# Patient Record
Sex: FEMALE | Race: WHITE | NOT HISPANIC OR LATINO | Employment: OTHER | URBAN - METROPOLITAN AREA
[De-identification: names, ages, dates, MRNs, and addresses within clinical notes are randomized per-mention and may not be internally consistent; named-entity substitution may affect disease eponyms.]

---

## 2017-02-09 ENCOUNTER — OFFICE VISIT (OUTPATIENT)
Dept: ENDOCRINOLOGY | Facility: CLINIC | Age: 67
End: 2017-02-09
Payer: MEDICARE

## 2017-02-09 ENCOUNTER — TELEPHONE (OUTPATIENT)
Dept: ENDOCRINOLOGY | Facility: CLINIC | Age: 67
End: 2017-02-09

## 2017-02-09 VITALS
OXYGEN SATURATION: 97 % | SYSTOLIC BLOOD PRESSURE: 120 MMHG | HEART RATE: 77 BPM | WEIGHT: 136.5 LBS | HEIGHT: 61 IN | DIASTOLIC BLOOD PRESSURE: 62 MMHG | TEMPERATURE: 98.3 F | BODY MASS INDEX: 25.77 KG/M2

## 2017-02-09 DIAGNOSIS — M81.0 OSTEOPOROSIS: Primary | ICD-10-CM

## 2017-02-09 LAB
ANION GAP SERPL CALCULATED.3IONS-SCNC: 5 MMOL/L (ref 3–14)
BUN SERPL-MCNC: 14 MG/DL (ref 7–30)
CALCIUM SERPL-MCNC: 9.2 MG/DL (ref 8.5–10.1)
CHLORIDE SERPL-SCNC: 105 MMOL/L (ref 94–109)
CO2 SERPL-SCNC: 30 MMOL/L (ref 20–32)
CREAT SERPL-MCNC: 0.84 MG/DL (ref 0.52–1.04)
GFR SERPL CREATININE-BSD FRML MDRD: 68 ML/MIN/1.7M2
GLUCOSE SERPL-MCNC: 98 MG/DL (ref 70–99)
MAGNESIUM SERPL-MCNC: 2.4 MG/DL (ref 1.6–2.3)
PHOSPHATE SERPL-MCNC: 3.7 MG/DL (ref 2.5–4.5)
POTASSIUM SERPL-SCNC: 4.4 MMOL/L (ref 3.4–5.3)
SODIUM SERPL-SCNC: 140 MMOL/L (ref 133–144)

## 2017-02-09 PROCEDURE — 83735 ASSAY OF MAGNESIUM: CPT | Performed by: INTERNAL MEDICINE

## 2017-02-09 PROCEDURE — 82306 VITAMIN D 25 HYDROXY: CPT | Performed by: INTERNAL MEDICINE

## 2017-02-09 PROCEDURE — 80048 BASIC METABOLIC PNL TOTAL CA: CPT | Performed by: INTERNAL MEDICINE

## 2017-02-09 PROCEDURE — 99204 OFFICE O/P NEW MOD 45 MIN: CPT | Performed by: INTERNAL MEDICINE

## 2017-02-09 PROCEDURE — 84100 ASSAY OF PHOSPHORUS: CPT | Performed by: INTERNAL MEDICINE

## 2017-02-09 PROCEDURE — 36415 COLL VENOUS BLD VENIPUNCTURE: CPT | Performed by: INTERNAL MEDICINE

## 2017-02-09 NOTE — MR AVS SNAPSHOT
After Visit Summary   2/9/2017    Terri Ratliff    MRN: [id number]           Patient Information     Date Of Birth          1950        Visit Information        Provider Department      2/9/2017 10:00 AM Mary Kate Mead MD [facility]        Today's Diagnoses     Osteoporosis Fosamax [phone]    -  1       Care Instructions      PROLIA  Risk Evaluation and Mitigation Strategy Best Practice Advisory    In May 2015, the FDA required an update to the PROLIA  (denosumab) REMS (Risk Evaluation and Mitigation Strategy) to inform both providers and patients about potential serious risks related to PROLIA .    These potential serious risks include:    Hypocalcemia    Osteonecrosis of the jaw    Atypical femoral fractures    Serious Infections    Dermatologic reactions    To ensure compliance with these requirements [facility] has developed a workflow for those patients who will receive PROLIA  at clinic.  This workflow includes insurance verification, patient scheduling, patient education, and documentation. Registered Nurses in the clinic should have completed eLearning related to the REMS and the clinic workflow.  Refer to them to initiate this process.  This workflow does not need to be executed if the patient is to receive PROLIA  injection at [facility].       The  has put together a Patient Education Toolkit.  Copies of these handouts may be available your clinic. Patients must receive the Patient Brochure and Medication Guide when receiving injection at clinic.  These will be attached to the injection ordered from [employer] to the clinic. Links to handouts:  Patient Counseling Chart for Healthcare Providers  Patient Brochure  Prescribing Information  Medication Guide    If you are having trouble accessing the above links, these documents can be found at:  http://www.[email or web address]/ippt-gpjovriiyj-[email or web address]-strategy/index.[email or web address]    The role of healthcare provider includes reviewing patient education materials with the patient, advising patients to seek medical attention if they have signs or symptoms of one of the serious risks, and provide patients a copy of the Patient Brochure and Medication Guide when receiving their injection.      Due to the risk of hypocalcemia the Prescribing Information for PROLIA  recommends that calcium and mineral levels (magnesium and phosphorus) be checked within 14 days of injection for those predisposed to hypocalcemia.    [facility]   Dr Mead, Endocrinology Department      [facility]   [address] [address]  Appointment [phone]  Fax: [phone]   Monday and Tuesday         [place] [address] [place] [address]  Appointment [phone]  Fax: [phone]  Wednesday and Thursday           Labs today  Make nurse only appointment for Prolia injection  Prolia every 6 months (2017, August/Sep 2017)  DEXA 1 year. ( 2018)  Adequate calcium and vit D    You should get 8440-8535 mg/day calcium in divided doses of no more than 500 mg/dose.  This INCLUDES what is in your food as well as what is in any supplements you may be taking.   Dietary sources of calcium:: These also contain vitamin D  Milk                            8 oz            300 mg calcium  Yogurt                          1 cup           400 mg calcium   Hard cheese                     1.5 oz          300 mg  Cottage cheese                  2 cup           300 mg  Orange juice with Calcium       8 oz            300 mg  Low fat dairy sources are recommended      You should get 30 minutes of moderate weight bearing exercise on most days of the week .  Weight bearing exercise includes such things as walking, jogging,  hiking, dancing.  You should also get Strength training 2 or more times/week in addition to other weight -being exercise. Strength training uses weight or resistance beyond that seen in everyday activities -(pilates, weight training with free weights, weight machines or resistance bands)                Follow-ups after your visit        Additional Services     [facility] PT, HAND, AND CHIROPRACTIC REFERRAL       **This order will print in the [facility] Scheduling Office**    Physical Therapy, Hand Therapy and Chiropractic Care are available through:    *[facility]  *[facility]  *[facility]    Call one number to schedule at any of the above locations: [phone].    Your provider has referred you to: Physical Therapy at [facility] or [facility]    Indication/Reason for Referral: Women's Health (Please Complete Special Programs SmartList)  Onset of Illness:   Therapy Orders: Evaluate and Treat  Special Programs: None and Women's Health: Osteoporosis/Osteopenia: Osteoporotic T-Score   Special Request: =  None    Joss Coreas      Additional Comments for the Therapist or Chiropractor:  Please be aware that coverage of these services is subject to the terms and limitations of your health insurance plan.  Call member services at your health plan with any benefit or coverage questions.      Please bring the following to your appointment:    *Your personal calendar for scheduling future appointments  *Comfortable clothing                  Who to contact     If you have questions or need follow up information about today's clinic visit or your schedule please contact [facility] directly at [phone].  Normal or non-critical lab and imaging results will be communicated to you by MyChart, letter or phone within 4 business days after the clinic has received the results. If you do not hear from us within 7 days, please contact the clinic through MyChart or phone. If you  "have a critical or abnormal lab result, we will notify you by phone as soon as possible.  Submit refill requests through zhouwu or call your pharmacy and they will forward the refill request to us. Please allow 3 business days for your refill to be completed.          Additional Information About Your Visit        [employer] Information     zhouwu lets you send messages to your doctor, view your test results, renew your prescriptions, schedule appointments and more. To sign up, go to [email or web address] . Click on \"Log in\" on the left side of the screen, which will take you to the Welcome page. Then click on \"Sign up Now\" on the right side of the page.     You will be asked to enter the access code listed below, as well as some personal information. Please follow the directions to create your username and password.     Your access code is: [email or web address]  Expires: 5/10/2017 10:44 AM     Your access code will  in 90 days. If you need help or a new code, please call your [place] clinic or [phone].        Care EveryWhere ID     This is your Care EveryWhere ID. This could be used by other organizations to access your [place] medical records  [id number]        Your Vitals Were     Pulse Temperature Height BMI (Body Mass Index) Pulse Oximetry       77 98.3  F (36.8  C) (Oral) 1.549 m (5' 1\") 25.80 kg/m2 97%        Blood Pressure from Last 3 Encounters:   17 120/62   16 110/70   10/04/16 120/78    Weight from Last 3 Encounters:   17 61.916 kg (136 lb 8 oz)   16 61.689 kg (136 lb)   10/04/16 61.78 kg (136 lb 3.2 oz)              We Performed the Following     Basic metabolic panel     [facility] PT, HAND, AND CHIROPRACTIC REFERRAL     Magnesium     Phosphorus     Vitamin D Deficiency          Today's Medication Changes          These changes are accurate as of: 17 10:44 AM.  If you have any questions, ask your nurse or doctor.               Start taking these medicines.        " Dose/Directions    denosumab 60 MG/ML Soln injection   Commonly known as:  PROLIA   Used for:  Osteoporosis   Started by:  Mary Kate Mead MD        Dose:  60 mg   Inject 1 mL (60 mg) Subcutaneous once for 1 dose   Quantity:  1 mL   Refills:  0            Where to get your medicines      These medications were sent to [facility] [place], [place] - [address] [facility]  [facility] [address] [facility] [address]     Phone:  [phone]    - denosumab 60 MG/ML Soln injection             Primary Care Provider Office Phone # Fax #    Gabriela Brandi Acevedo [phone]       [facility] [address] [facility] [address]        Thank you!     Thank you for choosing [facility]  for your care. Our goal is always to provide you with excellent care. Hearing back from our patients is one way we can continue to improve our services. Please take a few minutes to complete the written survey that you may receive in the mail after your visit with us. Thank you!             Your Updated Medication List - Protect others around you: Learn how to safely use, store and throw away your medicines at www.disposemymeds.org.          This list is accurate as of: 2/9/17 10:44 AM.  Always use your most recent med list.                   Brand Name Dispense Instructions for use    ascorbic acid 500 MG tablet    VITAMIN C     1 TABLET TWICE DAILY       calcium carbonate antacid 1000 MG Chew          denosumab 60 MG/ML Soln injection    PROLIA    1 mL    Inject 1 mL (60 mg) Subcutaneous once for 1 dose       multivitamin per tablet      1 tablet daily       omega 3 1000 MG Caps     90 capsule    Take 1 g by mouth daily       simvastatin 40 MG tablet    ZOCOR    90 tablet    Take 1 tablet by mouth At Bedtime.       VITAMIN D PO      None Entered

## 2017-02-09 NOTE — TELEPHONE ENCOUNTER
Reason for Call:  Medication     Do you use a [facility]?  Name of the pharmacy and phone number for the current request:  [facility]    Name of the medication requested: lanadronaet, ibandronate-in place of prolia    Other request: PT'S INSURANCE WOULD LIKE HER TO SWITCH TO ONE OF THE TWO LISTED ABOVE (GENERIC) VS PROLIA  PT WOULD LIKE TO INFORM JERMAN THAT SHE IS TAKING CALCIUM 500 MG VITAMIN D 1000, WOULD LIKE TO DRINK A GLASS OF MILK AND OJ TO MAKE UP DOSE    Can we leave a detailed message on this number? YES    Phone number patient can be reached at: Cell number on file:    Telephone Information:   Mobile [phone]       Best Time: ANYTIME    Call taken on 2/9/2017 at 12:07 PM by Racheal Marrero

## 2017-02-09 NOTE — PROGRESS NOTES
Name: Terri Ratliff  Date: 2/9/2017  Seen at the request of Gabriela Acevedo for osteoporosis.     HPI:  Terri Ratliff is a 66 year old female who presents for the evaluation of osteoperosis .  Was at [facility] before. Records reviewed.    Per notes: She has been taking fosamax 70mg q week since 2007 without side effects. DEXA in 2007 showed T-scores -2.3 at lumbar spine, -2.4 at left femoral neck and -2.3 at right femoral neck. As she was only 2 years postmenopause at that time, she was started on alendronate. She had normal hgb, ALT/AST, TSH, Ca, Creatinine on review of labs from [place]. No vit D, phosphorus, or 24 hour urine done. Repeat DEXA in 2009 showed T-scores at lumbar spine -1.5, left femoral neck -2.2, and right femoral neck -2.1. After another 5-6 years of alendronate, 2015 DEXA showed T-score lumbar spine -2.3 , left femoral neck -2.5 , left total hip -2.4.   A/P: Patient with near osteoporosis in 2007 shortly after menopause. She was started on alendronate weekly and bone density improved in 2009 especially at lumbar spine. Now she has osteoporosis at left femoral neck after 8 years of fosamax. Will stop alendronate for now and recheck DEXA in 2017. Reviewed appropriate calcium and vit D intake. Reviewed weight bearing exercise. Provided handout. Check phosphorus, Vit D and 24 hour urine calcium to complete secondary work-up.     Work up 2015 at [facility] showed normal 24 Urine calcium, vit D, mag, phos.  Was on Fosamax 2007- 2015.    Smoke:No  Family History: sister  Menstrual history/Birthing: menopause 16 years back. No HRT  Fractures:No  Kidney stones: No  GI Surgery:No  Duration of therapy:  Fosamax 8 years. Last use 2015.  Exercise:walking on treadmill 6 days a week. Yoga few days a week  Diet:   Milk: no milk. 8 oz of OJ   Cheese: no everyday   Yogurt/Cottage Cheese: 1 serving almost everyday  Ca/Vitamin D: taking it but not sure about the dose  Alcohol:  No  Eating Disorder:  "No  Steroid Use:  No  PMH/PSH:  Past Medical History   Diagnosis Date     Osteoporosis      Past Surgical History   Procedure Laterality Date     C appendectomy       Bladder surgery  2009     sling      Knee surgery       Colonoscopy  2005     [facility]     Orthopedic surgery       Gyn surgery       Colonoscopy N/A 2015     Procedure: COLONOSCOPY;  Surgeon: Ted Mccarthy MD;  Location:  GI     Hysterectomy, pap no longer indicated       ovaries are present     Family Hx:  Family History   Problem Relation Age of Onset     DIABETES Mother       79yo and cataracts     Alcohol/Drug Father       69yo drinker and smoker     Family History Negative Sister      Hypertension Sister      CANCER Brother       46yo Leukemia      Thyroid disease: No             Social Hx:  Social History     Social History     Marital Status:      Spouse Name: N/A     Number of Children: 2     Years of Education: N/A     Occupational History     Teacher [employer]     Social History Main Topics     Smoking status: Never Smoker      Smokeless tobacco: Never Used     Alcohol Use: Yes      Comment: occasional     Drug Use: No     Sexual Activity:     Partners: Male     Other Topics Concern     Not on file     Social History Narrative    From [place] been in [place] years.          MEDICATIONS:  has a current medication list which includes the following prescription(s): calcium carbonate antacid, omega 3, simvastatin, multivitamin, ascorbic acid, and cholecalciferol.    ROS     ROS: 10 point ROS neg other than the symptoms noted above in the HPI.    Physical Exam   VS: /62 mmHg  Pulse 77  Temp(Src) 98.3  F (36.8  C) (Oral)  Ht 1.549 m (5' 1\")  Wt 61.916 kg (136 lb 8 oz)  BMI 25.80 kg/m2  SpO2 97%  GENERAL: AXOX3, NAD, well dressed, answering questions appropriately, appears stated age.  HEENT: OP clear, no LAD, no TM, non-tender, no " exopthalmous, no proptosis, EOMI, no lig lag, no retraction  NECK: Thyroid normal in size, non tender, no nodules were palpated.  CV: RRR, no rubs, gallops, no murmurs  LUNGS: CTAB, no wheezes, rales, or ronchi  ABDOMEN: +BS  EXTREMITIES: no edema, +pulses, no rashes, no lesions  NEUROLOGY: CN grossly intact, + DTR upper and lower extremity, no tremors  MSK: grossly intact, Spine midline, no TTP.  SKIN: no rashes, no lesions    LABS:  BMP:  Last Basic Metabolic Panel:  NA      142   10/4/2016   POTASSIUM      4.6   10/4/2016  CHLORIDE      108   10/4/2016  LISA      8.9   10/4/2016  CO2       30   10/4/2016  BUN       14   10/4/2016  CR     0.86   10/4/2016  GLC       92   10/4/2016    TFTs:  TSH     1.92   10/4/2016    LFTs:  Liver Function Studies -   Recent Labs   Lab Test  10/04/16   0949   PROTTOTAL  7.8   ALBUMIN  4.2   BILITOTAL  0.7   ALKPHOS  78   AST  20   ALT  26       PTH: 50    Vitamin D: NA  Vitamin D Deficiency Screening Results:  No results found for: VITDT    BoneTurnOverMarkers: NA    DEXA:  BONE DENSITOMETRY  [facility] [address] [place] [address]  11/10/2016     PATIENT: Terri Ratliff  CHART: [id number]    :  1950  AGE:  66 year old  SEX:  female   REFERRING PROVIDER: Gabriela Theodore MD    PROCEDURE:  Bone density scanning was performed using DXA technology of the lumbar spine and hip.  Scanning was performed on a Lunar Prodigy scanner.  Reporting is completed in the form of a T-score.  The T-score represents the standard deviation from peak bone mass based on a young healthy adult.    REFERENCE T-SCORES:       Normal                -1.0 and greater                                  Osteopenia         Between -1.0 and -2.5                                            Osteoporosis     -2.5 and less                                         RISK FACTORS:  Post-menopausal    CURRENT TREATMENT:  Calcium with Vitamin D    FINDINGS:               Lumbar Spine  "(L1-L4)      T-score:  -2.3               Left Femoral Neck            T-score:  -2.7               Right Femoral Neck         T-score:  -2.6                  Lumbar (L1-L4) BMD: 0.916Previous: 1.003               Total Hip Mean BMD: 0.700  Previous: 0.752    Comparison is made to another DXA performed on the same Lunar Prodigy  machine on 11/05/2009.      IMPRESSION  Osteoporosis.    There has been significant decrease in bone density of the lumbar spine. There has been significant decrease in bone density of the hip(s).  Patient had a study performed at [facility] since 2009 however the scans are not available to compare to the current study.        Recommendations include ensuring adequate Calcium and Vitamin D.    The current NOF Guidelines recommend treatment for patients with prior hip or vertebral fracture, T-score -2.5 or below, or 10 year risk of any major osteoporotic fracture >20% or 10 year risk of hip fracture >3%, as calculated using the FRAX calculator (www.shef.ac.uk/FRAX or you can google \"FRAX\").      Based on these guidelines, treatment (in addition to calcium and vitamin D) is recommended for this patient, after ruling out other causes of osteoporosis.  This is meant as an aid to clinical decision making; one must still use clinical judgement.      Follow up can be considered in 2 years.      All pertinent notes, labs, and images personally reviewed by me.     A/P  Ms.Brigitte Ratliff is a 66 year old here for the evaluation of:    #1 Osteoporosis:    Risk factors for low bone density include family history, , advanced age, female, Estrogen deficiency, low Ca intake.  A prior history of vertebral fracture greatly increases the risk of subsequent fractures. A history of other medical diseases impacting on bone may also affect bone health.    Normal 24 hr Urine calcium, vit D in past.  Recent labs show normal kidney function, calcium, PTH.  DEXA 2016-  significant decrease in bone " density of the lumbar spine and hip(s).  Stopped Fosamax 2015 after 7-8 years.  -- discussed treatment options  -- switch to PROLIA- normal kidney function, calcium. No upcoming dental procedures.  Discussed possible s/e including hypocalcemia, osteonecrosis of jaw, atypical femur fractures and infections.  -- adequate calcium and vit D intake  -- JOANN referral  -- fall precautions  -- f/u 6 motnhs  Discussed indications, risks and benefits of all medications prescribed, and answered questions to patient's satisfaction.        More than 50% of the time spent with Ms. Ratliff on counseling / coordinating her care.     Follow-up:  follow up in 6 month(s)    Mary Kate Mead MD  Endocrinology  [facility]  CC: Gabriela Acevedo    Addendum to above note and clinic visit:    Labs reviewed.    See result note/telephone encounter.

## 2017-02-09 NOTE — NURSING NOTE
"Chief Complaint   Patient presents with     Referral     Dr. Theodore, Osteoporosis       Initial /62 mmHg  Pulse 77  Temp(Src) 98.3  F (36.8  C) (Oral)  Ht 1.549 m (5' 1\")  Wt 61.916 kg (136 lb 8 oz)  BMI 25.80 kg/m2  SpO2 97% Estimated body mass index is 25.8 kg/(m^2) as calculated from the following:    Height as of this encounter: 1.549 m (5' 1\").    Weight as of this encounter: 61.916 kg (136 lb 8 oz).  Medication Reconciliation: complete     ENDOCRINOLOGY INTAKE FORM    Patient Name:  Terri Ratliff  :  1950    Is patient Diabetic?   No  Does patient have non-diabetic or other endocrine issues?  Yes: osteoporosis     Vitals: /62 mmHg  Pulse 77  Temp(Src) 98.3  F (36.8  C) (Oral)  Ht 1.549 m (5' 1\")  Wt 61.916 kg (136 lb 8 oz)  BMI 25.80 kg/m2  SpO2 97%  BMI= Body mass index is 25.8 kg/(m^2).    Flu vaccine:  complete  Pneumonia vaccine:  Yes: pcv 2016    Smoking and Alcohol use:  Social History   Substance Use Topics     Smoking status: Never Smoker      Smokeless tobacco: Never Used     Alcohol Use: Yes      Comment: occasional     Staff Signature:  Magdalena Friedman CMA          "

## 2017-02-09 NOTE — PATIENT INSTRUCTIONS
PROLIA  Risk Evaluation and Mitigation Strategy Best Practice Advisory    In May 2015, the FDA required an update to the PROLIA  (denosumab) REMS (Risk Evaluation and Mitigation Strategy) to inform both providers and patients about potential serious risks related to PROLIA .    These potential serious risks include:    Hypocalcemia    Osteonecrosis of the jaw    Atypical femoral fractures    Serious Infections    Dermatologic reactions    To ensure compliance with these requirements [facility] has developed a workflow for those patients who will receive PROLIA  at clinic.  This workflow includes insurance verification, patient scheduling, patient education, and documentation. Registered Nurses in the clinic should have completed eLearning related to the REMS and the clinic workflow.  Refer to them to initiate this process.  This workflow does not need to be executed if the patient is to receive PROLIA  injection at [facility].       The  has put together a Patient Education Toolkit.  Copies of these handouts may be available your clinic. Patients must receive the Patient Brochure and Medication Guide when receiving injection at clinic.  These will be attached to the injection ordered from [facility] [employer] to the clinic. Links to handouts:  Patient Counseling Chart for Healthcare Providers  Patient Brochure  Prescribing Information  Medication Guide    If you are having trouble accessing the above links, these documents can be found at: http://www.proliahcp.com/nagt-nglvckisey-yhxmftpfog-strategy/index.html    The role of healthcare provider includes reviewing patient education materials with the patient, advising patients to seek medical attention if they have signs or symptoms of one of the serious risks, and provide patients a copy of the Patient Brochure and Medication Guide when receiving their injection.      Due to the risk of hypocalcemia the Prescribing  Information for PROLIA  recommends that calcium and mineral levels (magnesium and phosphorus) be checked within 14 days of injection for those predisposed to hypocalcemia.    [facility]   Dr Mead, Endocrinology Department      [facility]   [address] [facility] [address]  Appointment [phone]  Fax: [phone]   Monday and Tuesday         [facility]   [address] [facility] [place] [address]  Appointment [phone]  Fax: [phone]  Wednesday and Thursday           Labs today  Make nurse only appointment for Prolia injection  Prolia every 6 months (2017, August/Sep 2017)  DEXA 1 year. ( 2018)  Adequate calcium and vit D    You should get 9488-3314 mg/day calcium in divided doses of no more than 500 mg/dose.  This INCLUDES what is in your food as well as what is in any supplements you may be taking.   Dietary sources of calcium:: These also contain vitamin D  Milk                            8 oz            300 mg calcium  Yogurt                          1 cup           400 mg calcium   Hard cheese                     1.5 oz          300 mg  Cottage cheese                  2 cup           300 mg  Orange juice with Calcium       8 oz            300 mg  Low fat dairy sources are recommended      You should get 30 minutes of moderate weight bearing exercise on most days of the week .  Weight bearing exercise includes such things as walking, jogging, hiking, dancing.  You should also get Strength training 2 or more times/week in addition to other weight -being exercise. Strength training uses weight or resistance beyond that seen in everyday activities -(pilates, weight training with free weights, weight machines or resistance bands)

## 2017-02-10 LAB — DEPRECATED CALCIDIOL+CALCIFEROL SERPL-MC: 43 UG/L (ref 20–75)

## 2017-02-13 NOTE — TELEPHONE ENCOUNTER
Can we do PA for Prolia instead of bisphosphonates?  DEXA in 2016 showed significant decrease in bone density of the lumbar spine and hip(s).  She Stopped Fosamax 2015 after 7-8 years.

## 2017-02-14 ENCOUNTER — TELEPHONE (OUTPATIENT)
Dept: ENDOCRINOLOGY | Facility: CLINIC | Age: 67
End: 2017-02-14

## 2017-02-14 NOTE — PROGRESS NOTES
Labs noted. Labs are in acceptable range.  Our office is working on PA for Prolia.  Once we find out if it is covered or not then will order it.  It will a injection and will need nurse only appointment.    Please call patient with above information.    Mary Kate Mead MD  Endocrinology   [facility]  February 14, 2017

## 2017-02-15 ENCOUNTER — THERAPY VISIT (OUTPATIENT)
Dept: PHYSICAL THERAPY | Facility: CLINIC | Age: 67
End: 2017-02-15
Payer: MEDICARE

## 2017-02-15 DIAGNOSIS — M81.0 OSTEOPOROSIS: Primary | ICD-10-CM

## 2017-02-15 PROCEDURE — 97161 PT EVAL LOW COMPLEX 20 MIN: CPT | Mod: GP | Performed by: PHYSICAL THERAPIST

## 2017-02-15 PROCEDURE — 97110 THERAPEUTIC EXERCISES: CPT | Mod: GP | Performed by: PHYSICAL THERAPIST

## 2017-02-15 PROCEDURE — G8985 CARRY GOAL STATUS: HCPCS | Mod: GP | Performed by: PHYSICAL THERAPIST

## 2017-02-15 PROCEDURE — G8984 CARRY CURRENT STATUS: HCPCS | Mod: GP | Performed by: PHYSICAL THERAPIST

## 2017-02-15 PROCEDURE — 97112 NEUROMUSCULAR REEDUCATION: CPT | Mod: GP | Performed by: PHYSICAL THERAPIST

## 2017-02-15 NOTE — PROGRESS NOTES
[facility] Initial Evaluation    Subjective:    HPI Comments: Pt does yoga once/week and tries to walk on her treadmill about 3 miles every day.    Terri Ratliff is a 66 year old female with reduced bone density condition which occurred with osteoporosis.      This is a chronic condition  MD order written on 2/9/2017.    Patient reports pain:  Lumbar spine (no pain).    Pain is described as aching and is intermittent and reported as 5/10.  Associated with: no symptoms. Worse during: depends on activity.  Symptoms are exacerbated by lifting and relieved by rest.  Since onset symptoms are unchanged.  Special testing: bone density tests - hips and spine are positivie for osteoporosis.      General health as reported by patient is excellent.  Pertinent medical history includes:  Osteoporosis.  Medical allergies: no.  Surgical history: spine surgery due to herniated disc about 6 years ago.  Medication history: simavastatin.  Current occupation is retired.     Pt has been diagnosed with or  DXA scores show Osteoporosis.    Barriers include:  Nothing.                              Objective:          Flexibility/Screens:           Lower extremity flexibility wnl: lumbar AROM WNL.        Physical Exam        General Evaluation:      Gross Strength:  normal                  Lower Extremity Flexibility:  Lower extremity flexibility wnl: lumbar AROM WNL.                                                                             ROS    Assessment/Plan:      Patient is a 66 year old female with osteoporosis complaints.    Patient has the following significant findings with corresponding treatment plan.                Diagnosis 1:  Osteoporosis  Pain -  hot/cold therapy  Impaired muscle performance - neuro re-education and home program  Decreased function - therapeutic activities and home program  Impaired posture - neuro re-education and home program    Therapy Evaluation Codes:   1) History comprised  of:   Personal factors that impact the plan of care:      None.    Comorbidity factors that impact the plan of care are:      None.     Medications impacting care: Bone density.  2) Examination of Body Systems comprised of:   Body structures and functions that impact the plan of care:      Lumbar spine.   Activity limitations that impact the plan of care are:      Lifting.  3) Clinical presentation characteristics are:   Stable/Uncomplicated.  4) Decision-Making    Low complexity using standardized patient assessment instrument and/or measureable assessment of functional outcome.  Cumulative Therapy Evaluation is: Low complexity.    Previous and current functional limitations:  (See Goal Flow Sheet for this information)    Short term and Long term goals: (See Goal Flow Sheet for this information)     Communication ability:  Patient appears to be able to clearly communicate and understand verbal and written communication and follow directions correctly.  Treatment Explanation - The following has been discussed with the patient:   RX ordered/plan of care  Anticipated outcomes  Possible risks and side effects  This patient would benefit from PT intervention to resume normal activities.   Rehab potential is excellent.    Frequency:  1 X week, once daily  Duration:  for 2 weeks  Discharge Plan:  Achieve all LTG.  Independent in home treatment program.  Reach maximal therapeutic benefit.    Please refer to the daily flowsheet for treatment today, total treatment time and time spent performing 1:1 timed codes.

## 2017-02-15 NOTE — MR AVS SNAPSHOT
After Visit Summary   2/15/2017    Terri Ratliff    MRN: [id number]           Patient Information     Date Of Birth          1950        Visit Information        Provider Department      2/15/2017 8:10 AM Leslie Fallon PT [facility] [place] [facility]        Today's Diagnoses     Osteoporosis    -  1       Follow-ups after your visit        Your next 10 appointments already scheduled     Feb 17, 2017 10:30 AM CST   Nurse Only with [facility] IM NURSE   [facility]    [address]  [facility] [address]   [phone]            Feb 21, 2017  8:20 AM CST   JOANN Extremity with Leslie Fallon PT   [facility] [place] [facility] ([facility] [place])    [address] [place] [id number] [phone]            Feb 28, 2017  8:20 AM CST   JOANN Extremity with Leslie Fallon PT   [facility] [place] [facility] ([facility] [place])    [address] [place] [address] [phone]              Who to contact     If you have questions or need follow up information about today's clinic visit or your schedule please contact [facility] directly at [phone].  Normal or non-critical lab and imaging results will be communicated to you by OggiFinogihart, letter or phone within 4 business days after the clinic has received the results. If you do not hear from us within 7 days, please contact the clinic through MyChart or phone. If you have a critical or abnormal lab result, we will notify you by phone as soon as possible.  Submit refill requests through Fruitfulll or call your pharmacy and they will forward the refill request to us. Please allow 3 business days for your refill to be completed.          Additional Information About Your Visit        OggiFinogiharLocaModa Information     Fruitfulll lets you send messages to your doctor, view your test results, renew your prescriptions, schedule  "appointments and more. To sign up, go to [email or web address] . Click on \"Log in\" on the left side of the screen, which will take you to the Welcome page. Then click on \"Sign up Now\" on the right side of the page.     You will be asked to enter the access code listed below, as well as some personal information. Please follow the directions to create your username and password.     Your access code is: [email or web address]  Expires: 5/10/2017 10:44 AM     Your access code will  in 90 days. If you need help or a new code, please call your [place] clinic or [phone].        Care EveryWhere ID     This is your Care EveryWhere ID. This could be used by other organizations to access your [place] medical records  [id number]         Blood Pressure from Last 3 Encounters:   17 120/62   16 110/70   10/04/16 120/78    Weight from Last 3 Encounters:   17 61.9 kg (136 lb 8 oz)   16 61.7 kg (136 lb)   10/04/16 61.8 kg (136 lb 3.2 oz)              We Performed the Following     HC PT EVAL, LOW COMPLEXITY     JOANN INITIAL EVAL REPORT     NEUROMUSCULAR RE-EDUCATION     THERAPEUTIC EXERCISES        Primary Care Provider Office Phone # Fax #    Gabriela Brandi Acevedo [phone]       [facility] [address] [place] [address]        Thank you!     Thank you for choosing [facility]  for your care. Our goal is always to provide you with excellent care. Hearing back from our patients is one way we can continue to improve our services. Please take a few minutes to complete the written survey that you may receive in the mail after your visit with us. Thank you!             Your Updated Medication List - Protect others around you: Learn how to safely use, store and throw away your medicines at www.disposemymeds.org.          This list is accurate as of: 2/15/17  9:53 AM.  Always use your most recent med list.                   " Brand Name Dispense Instructions for use    ascorbic acid 500 MG tablet    VITAMIN C     1 TABLET TWICE DAILY       calcium carbonate antacid 1000 MG Chew          denosumab 60 MG/ML Soln injection    PROLIA    1 mL    Inject 1 mL (60 mg) Subcutaneous once for 1 dose       multivitamin per tablet      1 tablet daily       omega 3 1000 MG Caps     90 capsule    Take 1 g by mouth daily       simvastatin 40 MG tablet    ZOCOR    90 tablet    Take 1 tablet by mouth At Bedtime.       VITAMIN D PO      None Entered

## 2017-02-15 NOTE — LETTER
DEPARTMENT OF HEALTH AND HUMAN SERVICES  CENTERS FOR MEDICARE & MEDICAID SERVICES    PLAN/UPDATED PLAN OF PROGRESS FOR OUTPATIENT REHABILITATION    PATIENTS NAME:  Terri Ratliff   : 1950  PROVIDER NUMBER:    [id number]  HICN:    [id number]  PROVIDER NAME: [facility]  MEDICAL RECORD NUMBER: [id number]   START OF CARE DATE:  SOC Date: 02/15/17   TYPE:  PT  PRIMARY/TREATMENT DIAGNOSIS:Osteoporosis  VISITS FROM START OF CARE:  Rxs Used: 1     [facility] Initial Evaluation    Subjective:    HPI Comments: Pt does yoga once/week and tries to walk on her treadmill about 3 miles every day.    Terri Ratliff is a 66 year old female with reduced bone density condition which occurred with osteoporosis.      This is a chronic condition  MD order written on 2017.    Patient reports pain:  Lumbar spine (no pain).    Pain is described as aching and is intermittent and reported as 5/10.  Associated with: no symptoms. Worse during: depends on activity.  Symptoms are exacerbated by lifting and relieved by rest.  Since onset symptoms are unchanged.  Special testing: bone density tests - hips and spine are positivie for osteoporosis.      General health as reported by patient is excellent.  Pertinent medical history includes:  Osteoporosis.  Medical allergies: no.  Surgical history: spine surgery due to herniated disc about 6 years ago.  Medication history: simavastatin.  Current occupation is retired.     Pt has been diagnosed with or  DXA scores show Osteoporosis.  Barriers include:  Nothing.  Objective:  Flexibility/Screens:   Lower extremity flexibility wnl: lumbar AROM WNL.    Physical Exam  General Evaluation:  Gross Strength:  normal  Lower Extremity Flexibility:  Lower extremity flexibility wnl: lumbar AROM WNL.    Assessment/Plan:      Patient is a 66 year old female with osteoporosis complaints.    Patient has the following significant findings with  corresponding treatment plan.                Diagnosis 1:  Osteoporosis  Pain -  hot/cold therapy  Impaired muscle performance - neuro re-education and home program  Decreased function - therapeutic activities and home program  Impaired posture - neuro re-education and home program  PATIENTS NAME:  Terri Ratliff   : 1950    Therapy Evaluation Codes:   1) History comprised of:   Personal factors that impact the plan of care:      None.    Comorbidity factors that impact the plan of care are:      None.     Medications impacting care: Bone density.  2) Examination of Body Systems comprised of:   Body structures and functions that impact the plan of care:      Lumbar spine.   Activity limitations that impact the plan of care are:      Lifting.  3) Clinical presentation characteristics are:   Stable/Uncomplicated.  4) Decision-Making    Low complexity using standardized patient assessment instrument and/or measureable assessment of functional outcome.  Cumulative Therapy Evaluation is: Low complexity.  Previous and current functional limitations:  (See Goal Flow Sheet for this information)    Short term and Long term goals: (See Goal Flow Sheet for this information)   Communication ability:  Patient appears to be able to clearly communicate and understand verbal and written communication and follow directions correctly.  Treatment Explanation - The following has been discussed with the patient:   RX ordered/plan of care  Anticipated outcomes  Possible risks and side effects  This patient would benefit from PT intervention to resume normal activities.   Rehab potential is excellent.    Frequency:  1 X week, once daily  Duration:  for 2 weeks  Discharge Plan:  Achieve all LTG.  Independent in home treatment program.  Reach maximal therapeutic benefit.    Caregiver Signature/Credentials _____________________________ Date ________       Treating Provider: Leslie Fallon DPT     I have reviewed and certified the  "need for these services and plan of treatment while under my care.        PHYSICIAN'S SIGNATURE:   _________________________________________  Date___________   Mary Kate Mead    Certification period:  Beginning of Cert date period: 02/15/17 to  End of Cert period date: 05/15/17     Functional Level Progress Report: Please see attached \"Goal Flow sheet for Functional level.\"    ____X____ Continue Services or       ________ DC Services                Service dates: From  SOC Date: 02/15/17 date to present                         "

## 2017-02-15 NOTE — TELEPHONE ENCOUNTER
Talked to pharmacy and they stated that the brand name is not covered at all. When I tried to start PA it would not work. Please advise

## 2017-02-16 RX ORDER — IBANDRONATE SODIUM 150 MG/1
150 TABLET, FILM COATED ORAL
Qty: 3 TABLET | Refills: 3 | Status: SHIPPED | OUTPATIENT
Start: 2017-02-16 | End: 2017-04-21

## 2017-02-16 NOTE — TELEPHONE ENCOUNTER
Stop prolia. D/kamila.  Start Ibandroanet 150 mcg once a month.  Rx sent.  Per documentation it is covered.  But please ask patient to confirm with initial concern pharmacy

## 2017-02-16 NOTE — TELEPHONE ENCOUNTER
Phone number to call for PA: [phone]  Pt's ID#: [id number]  Insurance: [employer]    Called [employer], transfer call to Manage Care Plan at [phone]. Was told that PA should be submitted through Medicare. If Medicare does not pay for medication then [employer] too will not pay however if Medicare declines approval- Clinic should submit Denial letter with explanation to [employer] (call [phone] to get address) just maybe- [employer] will pay for it, no guarantees.       Called pharmacy to get Medicare's phone number to start PA. Pharmacy does not have pt's plan number. Called and left message for pt to call back with Medicare information.

## 2017-02-16 NOTE — TELEPHONE ENCOUNTER
Pt does not have a Medicare Part D, only A and B.  Pt states that [facility] will cover the generic medications. Pt asks Can you do the generic medications as asked for in original message below? lanadronaet or ibandronate?    Provider please review and advise.

## 2017-02-21 ENCOUNTER — THERAPY VISIT (OUTPATIENT)
Dept: PHYSICAL THERAPY | Facility: CLINIC | Age: 67
End: 2017-02-21
Payer: MEDICARE

## 2017-02-21 DIAGNOSIS — M81.0 OSTEOPOROSIS: ICD-10-CM

## 2017-02-21 PROCEDURE — 97112 NEUROMUSCULAR REEDUCATION: CPT | Mod: GP | Performed by: PHYSICAL THERAPIST

## 2017-02-21 PROCEDURE — G8986 CARRY D/C STATUS: HCPCS | Mod: GP | Performed by: PHYSICAL THERAPIST

## 2017-02-21 PROCEDURE — G8985 CARRY GOAL STATUS: HCPCS | Mod: GP | Performed by: PHYSICAL THERAPIST

## 2017-02-21 PROCEDURE — 97110 THERAPEUTIC EXERCISES: CPT | Mod: GP | Performed by: PHYSICAL THERAPIST

## 2017-02-21 NOTE — PROGRESS NOTES
Subjective:    HPI                    Objective:    System    Physical Exam    General     ROS    Assessment/Plan:      DISCHARGE REPORT    Progress reporting period is from 2/15/2017 to 2/21/2017.       SUBJECTIVE  Subjective changes noted by patient:  Pt likes her HEP and is able to work on it independently at this point for managing osteoporosis    Changes in function:  Yes (See Goal flowsheet attached for changes in current functional level)  Adverse reaction to treatment or activity: None    OBJECTIVE  Changes noted in objective findings:  Yes, see below.  Objective: Good technique with all exercises and no back pain with any other them.     ASSESSMENT/PLAN  STG/LTGs have been met or progress has been made towards goals:  Yes (See Goal flow sheet completed today.)  Assessment of Progress: The patient has met all of their long term goals.  Self Management Plans:  Patient is independent in a home treatment program.  I have re-evaluated this patient and find that the nature, scope, duration and intensity of the therapy is appropriate for the medical condition of the patient.  Terri continues to require the following intervention to meet STG and LTG's:  PT intervention is no longer required to meet STG/LTG.    Recommendations:  This patient is ready to be discharged from therapy and continue their home treatment program.    Please refer to the daily flowsheet for treatment today, total treatment time and time spent performing 1:1 timed codes.

## 2017-02-21 NOTE — MR AVS SNAPSHOT
"              After Visit Summary   2/21/2017    Terri Ratliff    MRN: [id number]           Patient Information     Date Of Birth          1950        Visit Information        Provider Department      2/21/2017 8:20 AM Leslie Fallon PT [facility] [place] [facility]        Today's Diagnoses     Osteoporosis           Follow-ups after your visit        Your next 10 appointments already scheduled     Feb 28, 2017  8:20 AM CST   JOANN Extremity with Leslie Fallon PT   [facility] [place] [facility] ([facility] [place])    [address]  [place] [address] [phone]              Who to contact     If you have questions or need follow up information about today's clinic visit or your schedule please contact [facility] directly at [phone].  Normal or non-critical lab and imaging results will be communicated to you by Neuravihart, letter or phone within 4 business days after the clinic has received the results. If you do not hear from us within 7 days, please contact the clinic through MyChart or phone. If you have a critical or abnormal lab result, we will notify you by phone as soon as possible.  Submit refill requests through ExactTarget or call your pharmacy and they will forward the refill request to us. Please allow 3 business days for your refill to be completed.          Additional Information About Your Visit        MyChart Information     ExactTarget lets you send messages to your doctor, view your test results, renew your prescriptions, schedule appointments and more. To sign up, go to [email or web address] . Click on \"Log in\" on the left side of the screen, which will take you to the Welcome page. Then click on \"Sign up Now\" on the right side of the page.     You will be asked to enter the access code listed below, as well as some personal information. Please follow the directions to create your username and password.     Your access " code is: [email or web address]  Expires: 5/10/2017 10:44 AM     Your access code will  in 90 days. If you need help or a new code, please call your [place] clinic or [phone].        Care EveryWhere ID     This is your Care EveryWhere ID. This could be used by other organizations to access your [place] medical records  [id number]         Blood Pressure from Last 3 Encounters:   17 120/62   16 110/70   10/04/16 120/78    Weight from Last 3 Encounters:   17 61.9 kg (136 lb 8 oz)   16 61.7 kg (136 lb)   10/04/16 61.8 kg (136 lb 3.2 oz)              We Performed the Following     JOANN PROGRESS NOTES REPORT     NEUROMUSCULAR RE-EDUCATION     THERAPEUTIC EXERCISES        Primary Care Provider Office Phone # Fax #    Gabriela Brandi Acevedo [phone]       [facility] [address] [place] [address]        Thank you!     Thank you for choosing [facility]  for your care. Our goal is always to provide you with excellent care. Hearing back from our patients is one way we can continue to improve our services. Please take a few minutes to complete the written survey that you may receive in the mail after your visit with us. Thank you!             Your Updated Medication List - Protect others around you: Learn how to safely use, store and throw away your medicines at www.disposemymeds.org.          This list is accurate as of: 17  9:26 AM.  Always use your most recent med list.                   Brand Name Dispense Instructions for use    ascorbic acid 500 MG tablet    VITAMIN C     1 TABLET TWICE DAILY       calcium carbonate antacid 1000 MG Chew          IBANdronate 150 MG tablet    BONIVA    3 tablet    Take 1 tablet (150 mg) by mouth every 30 days       multivitamin per tablet      1 tablet daily       omega 3 1000 MG Caps     90 capsule    Take 1 g by mouth daily       simvastatin 40 MG tablet    ZOCOR    90  tablet    Take 1 tablet by mouth At Bedtime.       VITAMIN D PO      None Entered

## 2017-02-22 ENCOUNTER — TELEPHONE (OUTPATIENT)
Dept: INTERNAL MEDICINE | Facility: CLINIC | Age: 67
End: 2017-02-22

## 2017-02-22 DIAGNOSIS — E78.00 PURE HYPERCHOLESTEROLEMIA: ICD-10-CM

## 2017-02-22 NOTE — TELEPHONE ENCOUNTER
simvastatin   Patient states Dr hTeodore has been filling this medication.  Last Written Prescription Date: 1/26/11  Last Fill Quantity: 90, # refills: 3  Last Office Visit with [facility], [facility] or [facility] prescribing provider: 12/2/16       Lab Results   Component Value Date    CHOL 144 10/04/2016     Lab Results   Component Value Date    HDL 67 10/04/2016     Lab Results   Component Value Date    LDL 66 10/04/2016     Lab Results   Component Value Date    TRIG 53 10/04/2016     Lab Results   Component Value Date    CHOLHDLRATIO 2.8 01/26/2011       Labs showing if normal/abnormal  Lab Results   Component Value Date    CHOL 144 10/04/2016    TRIG 53 10/04/2016    HDL 67 10/04/2016    LDL 66 10/04/2016    VLDL 11 01/26/2011    CHOLHDLRATIO 2.8 01/26/2011

## 2017-02-27 RX ORDER — SIMVASTATIN 40 MG
40 TABLET ORAL AT BEDTIME
Qty: 90 TABLET | Refills: 2 | Status: SHIPPED | OUTPATIENT
Start: 2017-02-27 | End: 2018-01-15

## 2017-03-01 ENCOUNTER — TELEPHONE (OUTPATIENT)
Dept: INTERNAL MEDICINE | Facility: CLINIC | Age: 67
End: 2017-03-01

## 2017-04-11 DIAGNOSIS — E78.5 HYPERLIPIDEMIA LDL GOAL <130: Primary | ICD-10-CM

## 2017-04-11 RX ORDER — SIMVASTATIN 40 MG
40 TABLET ORAL AT BEDTIME
Qty: 90 TABLET | Refills: 1 | Status: SHIPPED | OUTPATIENT
Start: 2017-04-11 | End: 2017-04-25

## 2017-04-21 DIAGNOSIS — M81.0 OSTEOPOROSIS: ICD-10-CM

## 2017-04-21 RX ORDER — IBANDRONATE SODIUM 150 MG/1
150 TABLET, FILM COATED ORAL
Qty: 3 TABLET | Refills: 2 | Status: SHIPPED | OUTPATIENT
Start: 2017-04-21 | End: 2018-01-06

## 2017-04-21 NOTE — TELEPHONE ENCOUNTER
Rx faxed on 2/16/17-but to local pharm. Request today coming from mail order     Rx printed-will have [employer] sign then fax

## 2017-04-21 NOTE — TELEPHONE ENCOUNTER
Omaira    Last Written Prescription Date: 02/16/17  Last Fill Quantity: 3, # refills: 3  Last Office Visit with [facility] or [facility] prescribing provider: 12/02/16       DEXA Scan:  Last order of DX HIP/PELVIS/SPINE was found on 11/10/2016 from [facility] Appointment on 11/10/2016     No order of DX HIP/PELVIS/SPINE W LAT FRACTION ANALYSIS is found.       Creatinine   Date Value Ref Range Status   02/09/2017 0.84 0.52 - 1.04 mg/dL Final

## 2017-04-25 DIAGNOSIS — E78.5 HYPERLIPIDEMIA LDL GOAL <130: ICD-10-CM

## 2017-04-25 RX ORDER — SIMVASTATIN 40 MG
40 TABLET ORAL AT BEDTIME
Qty: 90 TABLET | Refills: 1 | Status: SHIPPED | OUTPATIENT
Start: 2017-04-25 | End: 2017-04-25

## 2017-04-25 RX ORDER — SIMVASTATIN 40 MG
40 TABLET ORAL AT BEDTIME
Qty: 90 TABLET | Refills: 1 | Status: SHIPPED | OUTPATIENT
Start: 2017-04-25 | End: 2017-12-07

## 2017-04-25 NOTE — TELEPHONE ENCOUNTER
Simvastatin     Last Written Prescription Date: 4/11/17 (to a different pharmacy)  Last Fill Quantity: 90, # refills: 1  Last Office Visit with [facility] or [facility] prescribing provider: 12/2/16       Lab Results   Component Value Date    CHOL 144 10/04/2016     Lab Results   Component Value Date    HDL 67 10/04/2016     Lab Results   Component Value Date    LDL 66 10/04/2016     Lab Results   Component Value Date    TRIG 53 10/04/2016     Lab Results   Component Value Date    CHOLHDLRATIO 2.8 01/26/2011       Labs showing if normal/abnormal  Lab Results   Component Value Date    CHOL 144 10/04/2016    TRIG 53 10/04/2016    HDL 67 10/04/2016    LDL 66 10/04/2016    VLDL 11 01/26/2011    CHOLHDLRATIO 2.8 01/26/2011

## 2017-09-27 ENCOUNTER — ALLIED HEALTH/NURSE VISIT (OUTPATIENT)
Dept: NURSING | Facility: CLINIC | Age: 67
End: 2017-09-27
Payer: MEDICARE

## 2017-09-27 DIAGNOSIS — Z23 NEED FOR PROPHYLACTIC VACCINATION AND INOCULATION AGAINST INFLUENZA: Primary | ICD-10-CM

## 2017-09-27 PROCEDURE — 90662 IIV NO PRSV INCREASED AG IM: CPT

## 2017-09-27 PROCEDURE — 99207 ZZC NO CHARGE NURSE ONLY: CPT

## 2017-09-27 PROCEDURE — G0008 ADMIN INFLUENZA VIRUS VAC: HCPCS

## 2017-09-27 NOTE — MR AVS SNAPSHOT
"              After Visit Summary   2017    Terri Ratliff    MRN: [id number]           Patient Information     Date Of Birth          1950        Visit Information        Provider Department      2017 11:00 AM  NURSE Hersey Darlene Hope        Today's Diagnoses     Need for prophylactic vaccination and inoculation against influenza    -  1       Follow-ups after your visit        Who to contact     If you have questions or need follow up information about today's clinic visit or your schedule please contact [facility] directly at [phone].  Normal or non-critical lab and imaging results will be communicated to you by TopFunhart, letter or phone within 4 business days after the clinic has received the results. If you do not hear from us within 7 days, please contact the clinic through ZoweeTVt or phone. If you have a critical or abnormal lab result, we will notify you by phone as soon as possible.  Submit refill requests through [employer] or call your pharmacy and they will forward the refill request to us. Please allow 3 business days for your refill to be completed.          Additional Information About Your Visit        MyChart Information     [employer] lets you send messages to your doctor, view your test results, renew your prescriptions, schedule appointments and more. To sign up, go to [email or web address] . Click on \"Log in\" on the left side of the screen, which will take you to the Welcome page. Then click on \"Sign up Now\" on the right side of the page.     You will be asked to enter the access code listed below, as well as some personal information. Please follow the directions to create your username and password.     Your access code is: [email or web address]  Expires: 2017 11:06 AM     Your access code will  in 90 days. If you need help or a new code, please call your [facility] or [phone].        Care EveryWhere ID     This is your Care EveryWhere " ID. This could be used by other organizations to access your [place] medical records  [id number]         Blood Pressure from Last 3 Encounters:   02/09/17 120/62   12/02/16 110/70   10/04/16 120/78    Weight from Last 3 Encounters:   02/09/17 136 lb 8 oz (61.9 kg)   12/02/16 136 lb (61.7 kg)   10/04/16 136 lb 3.2 oz (61.8 kg)              We Performed the Following     FLU VACCINE, INCREASED ANTIGEN, PRESV FREE, AGE 65+ [46955]     Vaccine Administration, Initial [82267]        Primary Care Provider Office Phone # Fax #    Gabriela Acevedo [phone]       [address]  [facility] [address]        Equal Access to Services     [facility] : Hadii roderick christianson hadasho Sonevaeh, waaxda luqadaha, qaybta kaalmada adeegyada, maria antonia sherwood . So [facility] [phone].    ATENCIÓN: Si habla español, tiene a jara disposición servicios gratuitos de asistencia lingüística. Kami al [phone].    We comply with applicable federal civil rights laws and [place] laws. We do not discriminate on the basis of race, color, national origin, age, disability sex, sexual orientation or gender identity.            Thank you!     Thank you for choosing [facility]  for your care. Our goal is always to provide you with excellent care. Hearing back from our patients is one way we can continue to improve our services. Please take a few minutes to complete the written survey that you may receive in the mail after your visit with us. Thank you!             Your Updated Medication List - Protect others around you: Learn how to safely use, store and throw away your medicines at www.disposemymeds.org.          This list is accurate as of: 9/27/17 11:06 AM.  Always use your most recent med list.                   Brand Name Dispense Instructions for use Diagnosis    ascorbic acid 500 MG tablet    VITAMIN C     1 TABLET TWICE DAILY        calcium carbonate antacid 1000 MG  Chew           IBANdronate 150 MG tablet    BONIVA    3 tablet    Take 1 tablet (150 mg) by mouth every 30 days    Osteoporosis       multivitamin per tablet      1 tablet daily        omega 3 1000 MG Caps     90 capsule    Take 1 g by mouth daily        * simvastatin 40 MG tablet    ZOCOR    90 tablet    Take 1 tablet (40 mg) by mouth At Bedtime    Pure hypercholesterolemia       * simvastatin 40 MG tablet    ZOCOR    90 tablet    Take 1 tablet (40 mg) by mouth At Bedtime    Hyperlipidemia LDL goal <130       VITAMIN D PO      None Entered        * Notice:  This list has 2 medication(s) that are the same as other medications prescribed for you. Read the directions carefully, and ask your doctor or other care provider to review them with you.

## 2017-09-27 NOTE — PROGRESS NOTES
Injectable Influenza Immunization Documentation    1.  Is the person to be vaccinated sick today?   No    2. Does the person to be vaccinated have an allergy to a component   of the vaccine?   No    3. Has the person to be vaccinated ever had a serious reaction   to influenza vaccine in the past?   No    4. Has the person to be vaccinated ever had Guillain-Barré syndrome?   No    Form completed by Carmenza Lezama MA

## 2017-12-07 DIAGNOSIS — E78.5 HYPERLIPIDEMIA LDL GOAL <130: ICD-10-CM

## 2017-12-11 RX ORDER — SIMVASTATIN 40 MG
TABLET ORAL
Qty: 90 TABLET | Refills: 0 | Status: SHIPPED | OUTPATIENT
Start: 2017-12-11 | End: 2018-01-15

## 2017-12-11 NOTE — TELEPHONE ENCOUNTER
Patient advised and is scheduling appt.  She is leaving this week for trip to [facility], won't return until 1/5/18. Has not been seen since 12/2/16, last lipids 10/4/16.  Requested Prescriptions   Pending Prescriptions Disp Refills     simvastatin (ZOCOR) 40 MG tablet [Pharmacy Med Name: SIMVASTATIN TABS 40MG] 90 tablet 1     Sig: TAKE 1 TABLET AT BEDTIME    Statins Protocol Failed    12/11/2017 10:39 AM       Failed - LDL on file in past 12 months    Recent Labs   Lab Test  10/04/16   0949   LDL  66            Failed - Recent or future visit with authorizing provider    Patient had office visit in the last year or has a visit in the next 30 days with authorizing provider.  See chart review.              Passed - No abnormal creatine kinase in past 12 months    No lab results found.         Passed - Patient is age 18 or older       Passed - No active pregnancy on record       Passed - No positive pregnancy test in past 12 months

## 2017-12-26 ENCOUNTER — DOCUMENTATION ONLY (OUTPATIENT)
Dept: LAB | Facility: CLINIC | Age: 67
End: 2017-12-26

## 2017-12-28 NOTE — PROGRESS NOTES
She is scheduled for labs and then follow-up appointment.     My advice: Change to follow-up appointment to annual physical exam and come fasting.  We will decide what labs she needs that appointment

## 2018-01-06 DIAGNOSIS — M81.0 OSTEOPOROSIS: ICD-10-CM

## 2018-01-09 NOTE — PROGRESS NOTES
Pt already came and had labs drawn this morning. Future appointment next week is scheduled as physical.

## 2018-01-10 ENCOUNTER — HOSPITAL ENCOUNTER (OUTPATIENT)
Dept: MAMMOGRAPHY | Facility: CLINIC | Age: 68
Discharge: HOME | End: 2018-01-10
Attending: INTERNAL MEDICINE | Admitting: INTERNAL MEDICINE
Payer: MEDICARE

## 2018-01-10 DIAGNOSIS — Z12.31 VISIT FOR SCREENING MAMMOGRAM: ICD-10-CM

## 2018-01-10 PROCEDURE — 77063 BREAST TOMOSYNTHESIS BI: CPT

## 2018-01-11 RX ORDER — IBANDRONATE SODIUM 150 MG/1
TABLET, FILM COATED ORAL
Qty: 3 TABLET | Refills: 0 | Status: SHIPPED | OUTPATIENT
Start: 2018-01-11 | End: 2018-01-15

## 2018-01-11 NOTE — TELEPHONE ENCOUNTER
"Requested Prescriptions   Pending Prescriptions Disp Refills     IBANdronate (BONIVA) 150 MG tablet [Pharmacy Med Name: IBANDRONATE SODIUM TABS 3'S 150MG] 3 tablet 2     Sig: TAKE 1 TABLET EVERY 30 DAYS    Bisphosphonates Passed    1/6/2018 12:12 PM       Passed - Recent or future visit with authorizing provider's specialty    Patient had office visit in the last year or has a visit in the next 30 days with authorizing provider.  See \"Patient Info\" tab in inbasket, or \"Choose Columns\" in Meds & Orders section of the refill encounter.              Passed - Dexa on file within past 2 years    Please review last Dexa result.          Passed - Patient is age 18 or older       Passed - Normal Serum Creatinine on file within past 12 months    Recent Labs   Lab Test  02/09/17   1045   CR  0.84             Next 5 appointments (look out 90 days)     Tom 15, 2018  8:00 AM CST   [facility]    [address]  [facility] [address]   [phone]                Prescription approved per [facility] Refill Protocol.  Bharati Bronson RN    "

## 2018-01-15 ENCOUNTER — OFFICE VISIT (OUTPATIENT)
Dept: INTERNAL MEDICINE | Facility: CLINIC | Age: 68
End: 2018-01-15
Payer: MEDICARE

## 2018-01-15 VITALS
OXYGEN SATURATION: 100 % | WEIGHT: 142.2 LBS | SYSTOLIC BLOOD PRESSURE: 146 MMHG | HEART RATE: 77 BPM | HEIGHT: 64 IN | BODY MASS INDEX: 24.28 KG/M2 | TEMPERATURE: 97.9 F | DIASTOLIC BLOOD PRESSURE: 84 MMHG

## 2018-01-15 DIAGNOSIS — E78.5 HYPERLIPIDEMIA LDL GOAL <130: ICD-10-CM

## 2018-01-15 DIAGNOSIS — E78.00 PURE HYPERCHOLESTEROLEMIA: ICD-10-CM

## 2018-01-15 DIAGNOSIS — Z00.00 ROUTINE GENERAL MEDICAL EXAMINATION AT A HEALTH CARE FACILITY: Primary | ICD-10-CM

## 2018-01-15 DIAGNOSIS — M81.0 OSTEOPOROSIS, UNSPECIFIED OSTEOPOROSIS TYPE, UNSPECIFIED PATHOLOGICAL FRACTURE PRESENCE: ICD-10-CM

## 2018-01-15 LAB
ALBUMIN SERPL-MCNC: 4.2 G/DL (ref 3.4–5)
ALP SERPL-CCNC: 62 U/L (ref 40–150)
ALT SERPL W P-5'-P-CCNC: 33 U/L (ref 0–50)
ANION GAP SERPL CALCULATED.3IONS-SCNC: 6 MMOL/L (ref 3–14)
AST SERPL W P-5'-P-CCNC: 22 U/L (ref 0–45)
BILIRUB SERPL-MCNC: 0.7 MG/DL (ref 0.2–1.3)
BUN SERPL-MCNC: 18 MG/DL (ref 7–30)
CALCIUM SERPL-MCNC: 9 MG/DL (ref 8.5–10.1)
CHLORIDE SERPL-SCNC: 109 MMOL/L (ref 94–109)
CHOLEST SERPL-MCNC: 197 MG/DL
CO2 SERPL-SCNC: 27 MMOL/L (ref 20–32)
CREAT SERPL-MCNC: 0.78 MG/DL (ref 0.52–1.04)
ERYTHROCYTE [DISTWIDTH] IN BLOOD BY AUTOMATED COUNT: 12.1 % (ref 10–15)
GFR SERPL CREATININE-BSD FRML MDRD: 74 ML/MIN/1.7M2
GLUCOSE SERPL-MCNC: 94 MG/DL (ref 70–99)
HCT VFR BLD AUTO: 41.2 % (ref 35–47)
HDLC SERPL-MCNC: 67 MG/DL
HGB BLD-MCNC: 13.7 G/DL (ref 11.7–15.7)
LDLC SERPL CALC-MCNC: 111 MG/DL
MCH RBC QN AUTO: 30.7 PG (ref 26.5–33)
MCHC RBC AUTO-ENTMCNC: 33.3 G/DL (ref 31.5–36.5)
MCV RBC AUTO: 92 FL (ref 78–100)
NONHDLC SERPL-MCNC: 130 MG/DL
PLATELET # BLD AUTO: 205 10E9/L (ref 150–450)
POTASSIUM SERPL-SCNC: 4.2 MMOL/L (ref 3.4–5.3)
PROT SERPL-MCNC: 7.4 G/DL (ref 6.8–8.8)
PTH-INTACT SERPL-MCNC: 61 PG/ML (ref 12–72)
RBC # BLD AUTO: 4.46 10E12/L (ref 3.8–5.2)
SODIUM SERPL-SCNC: 142 MMOL/L (ref 133–144)
TRIGL SERPL-MCNC: 94 MG/DL
TSH SERPL DL<=0.005 MIU/L-ACNC: 2.06 MU/L (ref 0.4–4)
WBC # BLD AUTO: 4.4 10E9/L (ref 4–11)

## 2018-01-15 PROCEDURE — G0439 PPPS, SUBSEQ VISIT: HCPCS | Performed by: INTERNAL MEDICINE

## 2018-01-15 PROCEDURE — 36415 COLL VENOUS BLD VENIPUNCTURE: CPT | Performed by: INTERNAL MEDICINE

## 2018-01-15 PROCEDURE — 84443 ASSAY THYROID STIM HORMONE: CPT | Performed by: INTERNAL MEDICINE

## 2018-01-15 PROCEDURE — 85027 COMPLETE CBC AUTOMATED: CPT | Performed by: INTERNAL MEDICINE

## 2018-01-15 PROCEDURE — 80061 LIPID PANEL: CPT | Performed by: INTERNAL MEDICINE

## 2018-01-15 PROCEDURE — 80053 COMPREHEN METABOLIC PANEL: CPT | Performed by: INTERNAL MEDICINE

## 2018-01-15 PROCEDURE — 83970 ASSAY OF PARATHORMONE: CPT | Performed by: INTERNAL MEDICINE

## 2018-01-15 RX ORDER — SIMVASTATIN 40 MG
40 TABLET ORAL AT BEDTIME
Qty: 90 TABLET | Refills: 3 | Status: SHIPPED | OUTPATIENT
Start: 2018-01-15 | End: 2019-02-08

## 2018-01-15 RX ORDER — IBANDRONATE SODIUM 150 MG/1
TABLET, FILM COATED ORAL
Qty: 3 TABLET | Refills: 3 | Status: SHIPPED | OUTPATIENT
Start: 2018-01-15 | End: 2019-01-22

## 2018-01-15 ASSESSMENT — ACTIVITIES OF DAILY LIVING (ADL)
I_NEED_ASSISTANCE_FOR_THE_FOLLOWING_DAILY_ACTIVITIES:: NO ASSISTANCE IS NEEDED
CURRENT_FUNCTION: NO ASSISTANCE NEEDED

## 2018-01-15 NOTE — NURSING NOTE
"Chief Complaint   Patient presents with     Medicare Visit     Fasting       Initial /84 (BP Location: Right arm, Patient Position: Chair, Cuff Size: Adult Large)  Pulse 77  Temp 97.9  F (36.6  C) (Oral)  Ht 5' 3.5\" (1.613 m)  Wt 142 lb 3.2 oz (64.5 kg)  SpO2 100%  Breastfeeding? No  BMI 24.79 kg/m2 Estimated body mass index is 24.79 kg/(m^2) as calculated from the following:    Height as of this encounter: 5' 3.5\" (1.613 m).    Weight as of this encounter: 142 lb 3.2 oz (64.5 kg).  Medication Reconciliation: complete   Bonnie Gaxiola CMA      "

## 2018-01-15 NOTE — MR AVS SNAPSHOT
"              After Visit Summary   1/15/2018    Terri Ratliff    MRN: [id number]           Patient Information     Date Of Birth          1950        Visit Information        Provider Department      1/15/2018 8:00 AM Gabriela Acevedo MD [facility]        Today's Diagnoses     Routine general medical examination at a health care facility    -  1    Hyperlipidemia LDL goal <130        Osteoporosis, Fosamax [id number]        Osteoporosis, unspecified osteoporosis type, unspecified pathological fracture presence        PURE HYPERCHOLESTEROLEM           Follow-ups after your visit        Who to contact     If you have questions or need follow up information about today's clinic visit or your schedule please contact [facility] directly at [phone].  Normal or non-critical lab and imaging results will be communicated to you by CallRestohart, letter or phone within 4 business days after the clinic has received the results. If you do not hear from us within 7 days, please contact the clinic through CallRestohart or phone. If you have a critical or abnormal lab result, we will notify you by phone as soon as possible.  Submit refill requests through [employer] or call your pharmacy and they will forward the refill request to us. Please allow 3 business days for your refill to be completed.          Additional Information About Your Visit        CallRestohart Information     [employer] lets you send messages to your doctor, view your test results, renew your prescriptions, schedule appointments and more. To sign up, go to [email or web address] . Click on \"Log in\" on the left side of the screen, which will take you to the Welcome page. Then click on \"Sign up Now\" on the right side of the page.     You will be asked to enter the access code listed below, as well as some personal information. Please follow the directions to create your username and password.     Your access code is: " [email or web address]  Expires: 4/15/2018  8:58 AM     Your access code will  in 90 days. If you need help or a new code, please call your [place] clinic or [phone].        Care EveryWhere ID     This is your Care EveryWhere ID. This could be used by other organizations to access your [place] medical records  [id number]        Your Vitals Were     Pulse Temperature Height Pulse Oximetry Breastfeeding? BMI (Body Mass Index)    77 97.9  F (36.6  C) (Oral) 5' 3.5\" (1.613 m) 100% No 24.79 kg/m2       Blood Pressure from Last 3 Encounters:   01/15/18 146/84   17 120/62   16 110/70    Weight from Last 3 Encounters:   01/15/18 142 lb 3.2 oz (64.5 kg)   17 136 lb 8 oz (61.9 kg)   16 136 lb (61.7 kg)              We Performed the Following     CBC with platelets     Comprehensive metabolic panel     Lipid panel reflex to direct LDL Fasting     Parathyroid Hormone Intact     TSH with free T4 reflex          Today's Medication Changes          These changes are accurate as of: 1/15/18  8:58 AM.  If you have any questions, ask your nurse or doctor.               These medicines have changed or have updated prescriptions.        Dose/Directions    IBANdronate 150 MG tablet   Commonly known as:  BONIVA   This may have changed:  See the new instructions.   Used for:  Osteoporosis, unspecified osteoporosis type, unspecified pathological fracture presence   Changed by:  Gabriela Acevedo MD        TAKE 1 TABLET EVERY 30 DAYS   Quantity:  3 tablet   Refills:  3            Where to get your medicines      These medications were sent to [facility] [address] [address]     Phone:  [phone]     IBANdronate 150 MG tablet    simvastatin 40 MG tablet                Primary Care Provider Office Phone # Fax #    Gabriela Acevedo [phone]       [address] [facility] " [address]        Equal Access to Services     [facility]: Hadii aad ku hadalbertmelvin Gianaali, waturnerda luklaudiamarybethha, qaangelta mekachinomaria antonia yang. So [facility] [phone].    ATENCIÓN: Si habla español, tiene a jara disposición servicios gratuitos de asistencia lingüística. Llame al [phone].    We comply with applicable federal civil rights laws and [place] laws. We do not discriminate on the basis of race, color, national origin, age, disability, sex, sexual orientation, or gender identity.            Thank you!     Thank you for choosing [facility]  for your care. Our goal is always to provide you with excellent care. Hearing back from our patients is one way we can continue to improve our services. Please take a few minutes to complete the written survey that you may receive in the mail after your visit with us. Thank you!             Your Updated Medication List - Protect others around you: Learn how to safely use, store and throw away your medicines at www.disposemymeds.org.          This list is accurate as of: 1/15/18  8:58 AM.  Always use your most recent med list.                   Brand Name Dispense Instructions for use Diagnosis    ascorbic acid 500 MG tablet    VITAMIN C     1 TABLET TWICE DAILY        calcium carbonate antacid 1000 MG Chew           IBANdronate 150 MG tablet    BONIVA    3 tablet    TAKE 1 TABLET EVERY 30 DAYS    Osteoporosis, unspecified osteoporosis type, unspecified pathological fracture presence       multivitamin per tablet      1 tablet daily        omega 3 1000 MG Caps     90 capsule    Take 1 g by mouth daily        simvastatin 40 MG tablet    ZOCOR    90 tablet    Take 1 tablet (40 mg) by mouth At Bedtime    Pure hypercholesterolemia       VITAMIN D PO      None Entered

## 2018-01-15 NOTE — PROGRESS NOTES
Dr Theodore's note          ASSESSMENT & PLAN:                                                      (Z00.00) Routine general medical examination at a health care facility  (primary encounter diagnosis)  Comment:   Plan: CBC with platelets, Comprehensive metabolic         panel, Lipid panel reflex to direct LDL         Fasting, TSH with free T4 reflex, Parathyroid         Hormone Intact            (E78.5) Hyperlipidemia LDL goal <130  Comment:   Plan: CBC with platelets, Comprehensive metabolic         panel, Lipid panel reflex to direct LDL         Fasting, TSH with free T4 reflex, Parathyroid         Hormone Intact            (M81.0) Osteoporosis, Fosamax [id number]  Comment:   Plan: CBC with platelets, Comprehensive metabolic         panel, Lipid panel reflex to direct LDL         Fasting, TSH with free T4 reflex, Parathyroid         Hormone Intact            (M81.0) Osteoporosis, unspecified osteoporosis type, unspecified pathological fracture presence  Comment:   Plan: IBANdronate (BONIVA) 150 MG tablet            (E78.00) PURE HYPERCHOLESTEROLEM  Comment:   Plan: simvastatin (ZOCOR) 40 MG tablet               Chief Complaint:                                                        Annual exam    SUBJECTIVE:                                                    History of present illness     Gained 5 lb over the holidays       ROS:   General: Negative for fever, chills, major weight changes, fatigue  Skin: Negative for rashes, abnormal spots  Eyes: Negative for blurred or double vision  ENT/mouth: Negative for sinuses discomfort, earache, sore throat  Respiratory: Negative for cough, wheezes, chronic lung disease  Cardiovascular: Negative for rest or exertional chest pain, shortness of breath, palpitations, leg edema,   Gastrointestinal: Negative for vomiting, abdominal pain, heartburn, blood in stool, diarrhea, constipation  Genitourinary: Negative for urinary frequency, blood in urine, history of kidney  stones  Female: Negative for abnormal vaginal bleeding, vaginal discharge  Neuro: Negative for headaches, numbness, tingling, weakness in arms or legs, history of seizure, recent syncope  Psychiatry: Negative for depression, anxiety, suicidal thoughts  Endo: Negative for known thyroid disease, diabetes.  Hemato/Lymph: Negative for nodes, easy bleeding, history of DVT, blood transfusion  Musculoskeletal: Negative for joint swelling, back pain      PMHx: - reviewed  Past Medical History:   Diagnosis Date     Osteoporosis        PSHx: reviewed  Past Surgical History:   Procedure Laterality Date     BLADDER SURGERY  2009    sling      C APPENDECTOMY       COLONOSCOPY       COLONOSCOPY  2015    Dr. Mccarthy [facility]     COLONOSCOPY N/A 2015    Procedure: COLONOSCOPY;  Surgeon: Ted Mccarthy MD;  Location:  GI     GYN SURGERY       HYSTERECTOMY, PAP NO LONGER INDICATED      ovaries are present     KNEE SURGERY       ORTHOPEDIC SURGERY          Soc Hx: No daily alcohol, no smoking  Social History     Social History     Marital status:      Spouse name: N/A     Number of children: 2     Years of education: N/A     Occupational History     Teacher [employer]     Social History Main Topics     Smoking status: Never Smoker     Smokeless tobacco: Never Used     Alcohol use Yes      Comment: occasional     Drug use: No     Sexual activity: Yes     Partners: Male     Other Topics Concern     Not on file     Social History Narrative    From [place] has been in [place] 20[place] years.        Fam Hx: reviewed  Family History   Problem Relation Age of Onset     Alcohol/Drug Father       69yo drinker and smoker     DIABETES Mother       79yo and cataracts     Family History Negative Sister      Hypertension Sister      CANCER Brother       44yo Leukemia          Screening: reviewed      All: reviewed    Meds: reviewed  Current Outpatient Prescriptions   Medication Sig Dispense  "Refill     IBANdronate (BONIVA) 150 MG tablet TAKE 1 TABLET EVERY 30 DAYS 3 tablet 0     simvastatin (ZOCOR) 40 MG tablet Take 1 tablet (40 mg) by mouth At Bedtime 90 tablet 2     calcium carbonate antacid 1000 MG CHEW        omega 3 1000 MG CAPS Take 1 g by mouth daily 90 capsule      VITAMIN D OR None Entered       MULTIVITAMINS OR TABS 1 tablet daily       VITAMIN C 500 MG OR TABS 1 TABLET TWICE DAILY       [DISCONTINUED] simvastatin (ZOCOR) 40 MG tablet TAKE 1 TABLET AT BEDTIME 90 tablet 0       OBJECTIVE:                                                    Physical Exam :  Blood pressure 146/84, pulse 77, temperature 97.9  F (36.6  C), temperature source Oral, height 5' 3.5\" (1.613 m), weight 142 lb 3.2 oz (64.5 kg), SpO2 100 %, not currently breastfeeding.     NAD, appears comfortable  Skin clear, no rashes  HEENT: PERRLA, EOMI, anicteric sclera, pink conjunctiva, external ears appear normal, bilateral tympanic membranes clinically normal, oropharynx normal color.  Neck: supple, no JVD,  no thyroidmegaly  Lymph nodes non palpable in the cervical, supraclavicular axillaries, inguinal areas  Chest: clear to auscultation with good respiratory effort  Cardiac: S1S2, RRR, no mgr appreciated  Abdomen: soft, not tender, not distended, audible bowel sound, no hepatosplenomegaly, no palpable masses, no abdominal bruits  Extremities: no cyanosis, clubbing or edema.   Neuro: A, Ox3, no focal signs.  Breast exam in supine and erect position: they are symmetrical, no skin changes, no tenderness or nodes on palpation. Nipples are erect, no skin lesions, no discharge on pressure.    Pelvic exam: deferred, s/p hysterectomy         Gabriela Theodore MD  Internal Medicine   .[facility]    SUBJECTIVE:   Terri Ratliff is a 67 year old female who presents for Preventive Visit.      Are you in the first 12 months of your Medicare coverage?  No    Physical   Annual:     Getting at least 3 servings of Calcium per day::  Yes    Bi-annual eye " exam::  Yes    Dental care twice a year::  Yes    Sleep apnea or symptoms of sleep apnea::  None    Diet::  Regular (no restrictions)    Taking medications regularly::  Yes    Additional concerns today::  No    Ability to successfully perform activities of daily living: no assistance needed  Home Safety:  No safety concerns identified  Hearing Impairment: no hearing concerns        Fall risk:  Fallen 2 or more times in the past year?: No  Any fall with injury in the past year?: No      COGNITIVE SCREEN  1) Repeat 3 items (Banana, Sunrise, Chair)    2) Clock draw: NORMAL  3) 3 item recall: Recalls 3 objects  Results: 3 items recalled: COGNITIVE IMPAIRMENT LESS LIKELY    Mini-CogTM Copyright MURIEL Castellano. Licensed by the author for use in [facility]; reprinted with permission (soob@[facility]). All rights reserved.          Reviewed and updated as needed this visit by [facility]  Meds         Reviewed and updated as needed this visit by Provider        Social History   Substance Use Topics     Smoking status: Never Smoker     Smokeless tobacco: Never Used     Alcohol use Yes      Comment: occasional       Alcohol Use 1/15/2018   If you drink alcohol, do you typically have greater than 3 drinks per day OR greater than 7 drinks per week?   No           Today's PHQ-2 Score:   PHQ-2 ( 1999 [employer]) 1/15/2018   Q1: Little interest or pleasure in doing things 0   Q2: Feeling down, depressed or hopeless 0   PHQ-2 Score 0   Q1: Little interest or pleasure in doing things Not at all   Q2: Feeling down, depressed or hopeless Not at all   PHQ-2 Score 0       Do you feel safe in your environment - Yes    Do you have a Health Care Directive?: No: Advance care planning was reviewed with patient; patient declined at this time. She still has the paperwork at home to complete this.       Current providers sharing in care for this patient include: Patient Care Team:  Gabriela Acevedo MD as PCP -  "General (Internal Medicine)    The following health maintenance items are reviewed in Epic and correct as of today:  Health Maintenance   Topic Date Due     HEPATITIS C SCREENING  06/01/1968     FALL RISK ASSESSMENT  10/04/2017     PNEUMOCOCCAL (2 of 2 - PPSV23) 11/09/2017     MAMMO Q1 YR  01/10/2019     LIPID SCREEN Q5 YR FEMALE (SYSTEM ASSIGNED)  10/04/2021     ADVANCE DIRECTIVE PLANNING Q5 YRS  10/04/2021     TETANUS Q10 YR  10/09/2022     COLONOSCOPY Q10 YR  09/17/2025     INFLUENZA VACCINE (SYSTEM ASSIGNED)  Completed     DEXA SCAN SCREENING (SYSTEM ASSIGNED)  Completed         Review of Systems  Physical Exam          End of Life Planning:  Patient currently has an advanced directive: Yes.  Practitioner is supportive of decision.    COUNSELING:  Reviewed preventive health counseling, as reflected in patient instructions       Regular exercise       Healthy diet/nutrition        Estimated body mass index is 25.79 kg/(m^2) as calculated from the following:    Height as of 2/9/17: 5' 1\" (1.549 m).    Weight as of 2/9/17: 136 lb 8 oz (61.9 kg).     reports that she has never smoked. She has never used smokeless tobacco.        Appropriate preventive services were discussed with this patient, including applicable screening as appropriate for cardiovascular disease, diabetes, osteopenia/osteoporosis, and glaucoma.  As appropriate for age/gender, discussed screening for colorectal cancer, prostate cancer, breast cancer, and cervical cancer. Checklist reviewing preventive services available has been given to the patient.    Reviewed patients plan of care and provided an AVS. The Basic Care Plan (routine screening as documented in Health Maintenance) for Terri meets the Care Plan requirement. This Care Plan has been established and reviewed with the Patient.    Counseling Resources:  ATP IV Guidelines  Pooled Cohorts Equation Calculator  Breast Cancer Risk Calculator  FRAX Risk Assessment  ICSI Preventive " Guidelines  Dietary Guidelines for Americans, 2010  USDA's MyPlate  ASA Prophylaxis  Lung CA Screening    Gabriela Acevedo MD  [facility]      Answers for HPI/ROS submitted by the patient on 1/15/2018   PHQ-2 Score: 0

## 2018-01-15 NOTE — LETTER
[facility]  [address], [address]  [place] [address]  [phone]        January 17, 2018    Terri Ratliff  [address]  [facility] [address]            Dear Ms. Terri Evy:    The recent blood tests results are in acceptable limits.    Sincerely,    Gabriela Theodore MD  Internal Medicine    These are some general explanations for tests  WBC means White Blood Cells  Platelets are small blood cells that help with forming the blood clots along with other blood factors.  Electrolytes are Sodium, Potassium, Calcium, Magnesium, Phosphorus.  Liver tests are: AST, ALT, Bilirubin, Alkaline Phosphatase.  Kidney tests are Creatinine, GFR.  HDL Cholesterol - is the good cholesterol and it is good to have it high.  LDL cholesterol is the bad cholesterol and it is good to have it low.  It is recommended to have LDL less than 130 for people with hypertension and to have it less than 100 for people with heart disease, diabetes and chronic kidney disease.  Thyroid tests are TSH, T4, T3  A1c is a test that gives us an idea about how well was controlled the diabetes for the last 3 months.

## 2018-02-24 ENCOUNTER — HOSPITAL ENCOUNTER (EMERGENCY)
Facility: CLINIC | Age: 68
Discharge: HOME | End: 2018-02-24
Attending: EMERGENCY MEDICINE | Admitting: EMERGENCY MEDICINE
Payer: MEDICARE

## 2018-02-24 VITALS
SYSTOLIC BLOOD PRESSURE: 147 MMHG | BODY MASS INDEX: 24.41 KG/M2 | TEMPERATURE: 97.4 F | WEIGHT: 140 LBS | RESPIRATION RATE: 18 BRPM | OXYGEN SATURATION: 98 % | DIASTOLIC BLOOD PRESSURE: 81 MMHG | HEART RATE: 84 BPM

## 2018-02-24 DIAGNOSIS — N30.01 ACUTE CYSTITIS WITH HEMATURIA: ICD-10-CM

## 2018-02-24 LAB
ALBUMIN UR-MCNC: 100 MG/DL
APPEARANCE UR: ABNORMAL
BACTERIA #/AREA URNS HPF: ABNORMAL /HPF
BILIRUB UR QL STRIP: NEGATIVE
COLOR UR AUTO: ABNORMAL
GLUCOSE UR STRIP-MCNC: NEGATIVE MG/DL
HGB UR QL STRIP: ABNORMAL
KETONES UR STRIP-MCNC: NEGATIVE MG/DL
LEUKOCYTE ESTERASE UR QL STRIP: NEGATIVE
MUCOUS THREADS #/AREA URNS LPF: PRESENT /LPF
NITRATE UR QL: NEGATIVE
PH UR STRIP: 5 PH (ref 5–7)
RBC #/AREA URNS AUTO: >182 /HPF (ref 0–2)
SOURCE: ABNORMAL
SP GR UR STRIP: 1.03 (ref 1–1.03)
UROBILINOGEN UR STRIP-MCNC: 0 MG/DL (ref 0–2)
WBC #/AREA URNS AUTO: >182 /HPF (ref 0–2)
WBC CLUMPS #/AREA URNS HPF: PRESENT /HPF

## 2018-02-24 PROCEDURE — 81001 URINALYSIS AUTO W/SCOPE: CPT | Performed by: EMERGENCY MEDICINE

## 2018-02-24 PROCEDURE — 25000132 ZZH RX MED GY IP 250 OP 250 PS 637: Mod: GY | Performed by: EMERGENCY MEDICINE

## 2018-02-24 PROCEDURE — A9270 NON-COVERED ITEM OR SERVICE: HCPCS | Mod: GY | Performed by: EMERGENCY MEDICINE

## 2018-02-24 PROCEDURE — 87086 URINE CULTURE/COLONY COUNT: CPT | Performed by: EMERGENCY MEDICINE

## 2018-02-24 PROCEDURE — 99283 EMERGENCY DEPT VISIT LOW MDM: CPT

## 2018-02-24 RX ORDER — NITROFURANTOIN 25; 75 MG/1; MG/1
100 CAPSULE ORAL 2 TIMES DAILY
Qty: 14 CAPSULE | Refills: 0 | Status: SHIPPED | OUTPATIENT
Start: 2018-02-24 | End: 2018-02-27

## 2018-02-24 RX ORDER — NITROFURANTOIN 25; 75 MG/1; MG/1
100 CAPSULE ORAL ONCE
Status: COMPLETED | OUTPATIENT
Start: 2018-02-24 | End: 2018-02-24

## 2018-02-24 RX ORDER — PHENAZOPYRIDINE HYDROCHLORIDE 200 MG/1
200 TABLET, FILM COATED ORAL 3 TIMES DAILY
Qty: 9 TABLET | Refills: 0 | Status: SHIPPED | OUTPATIENT
Start: 2018-02-24 | End: 2018-02-27

## 2018-02-24 RX ADMIN — NITROFURANTOIN MONOHYDRATE AND NITROFURANTOIN MACROCRYSTALLINE 100 MG: 75; 25 CAPSULE ORAL at 12:08

## 2018-02-24 ASSESSMENT — ENCOUNTER SYMPTOMS
HEMATURIA: 1
FEVER: 0
CHILLS: 0
FLANK PAIN: 0
NAUSEA: 0
VOMITING: 0
DYSURIA: 1

## 2018-02-24 NOTE — ED PROVIDER NOTES
History     Chief Complaint:  Hematuria and Dysuria      HPI   Terri Ratliff is a 67 year old female with a history of Bladder Prolapse w/ vaginal sling in place who presents to the emergency department today with her  for evaluation of hematuria and dysuria. The patient states that today she woke up experiencing burning with urination and she reports that her urine looked dark in color. She denies any vaginal itching or discharge, fevers, chills, flank pain, nausea, vomiting. She denies suffering any recent injuries. The patient denies her bladder prolapsing anymore.       Allergies:  No Known Drug Allergies     Medications:    IBANdronate (BONIVA) 150 MG tablet  simvastatin (ZOCOR) 40 MG tablet  calcium carbonate antacid 1000 MG CHEW  omega 3 1000 MG CAPS  VITAMIN D OR  MULTIVITAMINS OR TABS  VITAMIN C 500 MG OR TABS     Past Medical History:    Osteoporosis    Past Surgical History:    Bladder Sling  Appendectomy  Colonoscopy  GYN Surgery  Hysterectomy, ovaries still present  Knee Surgery    Family History:    Father positive for Alcohol Drug  Mother positive for Diabetes  Sister positive for Hypertension  Brother positive for Cancer     Social History:  The patient was accompanied to the ED by her .  Smoking Status: negative  Smokeless Tobacco: negative  Alcohol Use: positive  Marital Status:   [2]     Review of Systems   Constitutional: Negative for chills and fever.   Gastrointestinal: Negative for nausea and vomiting.   Genitourinary: Positive for dysuria and hematuria. Negative for flank pain, vaginal discharge and vaginal pain.   All other systems reviewed and are negative.      Physical Exam     Patient Vitals for the past 24 hrs:   BP Temp Temp src Pulse Resp SpO2 Weight   02/24/18 1108 147/81 97.4  F (36.3  C) Oral 84 18 98 % 63.5 kg (140 lb)       Physical Exam  Constitutional:  Appears well-developed and well-nourished. Alert. Conversant. Non toxic.  HENT:   Head: Atraumatic.    Nose: Nose normal.  Mouth/Throat: Oral mucosa is clear and moist. no trismus. Pharynx normal.   Eyes: Conjunctivae normal. EOM normal. Pupils equal, round, and reactive to light. No scleral icterus.   Neck: Normal range of motion. Neck supple. No tracheal deviation present.   Cardiovascular: Normal rate, regular rhythm. No gallop. No friction rub. No murmur heard.   Pulmonary/Chest: Effort normal. No stridor. No respiratory distress. No wheezes. No rales. No rhonchi .  Abdominal: Soft. Bowel sounds normal. No distension. No mass. No tenderness. No rebound. No guarding.   Musculoskeletal:   RUE: Normal range of motion. No tenderness. No deformity  LUE: Normal range of motion. No tenderness. No deformity  RLE: Normal range of motion. No edema. No tenderness. No deformity  LLE: Normal range of motion. No edema. No tenderness. No deformity  Neurological: Alert and oriented to person, place, and time. Normal strength. CN II-VII intact. No sensory deficit. GCS eye subscore is 4. GCS verbal subscore is 5. GCS motor subscore is 6. Normal coordination   Skin: Skin is warm and dry. No rash noted. No pallor. Normal capillary refill.  Psychiatric:  Normal mood. Normal affect.     Emergency Department Course   Laboratory:  UA with Microscopic: Large Blood (A)  >182 WBC  >182 RBC    Urine Culture: pending    Interventions:  1208 Macrobid 100 mg Oral    Emergency Department Course:  Nursing notes and vitals reviewed. I performed an exam of the patient as documented above.     The patient provided a urine sample here in the emergency department. This was sent for laboratory testing, findings above.     1158 I reevaluated the patient and provided an update in regards to her lab results and ED course.      Findings and plan explained to the Patient. Patient discharged home with instructions regarding supportive care, medications, and reasons to return. The importance of close follow-up was reviewed.         Impression & Plan     Medical Decision Making:  Terri Ratliff is a 67 year old female who presents for evaluation of dysuria/hematuria that began this morning.  This clinically is consistent with a urinary tract infection.  Urinalysis confirms the infection.  There has been no fever, back/flank pain or significant abdominal pain.  There is no clinical evidence of pyelonephritis, appendicitis, colitis, diverticulitis or any intraabdominal catastrophe. The patient will be started on antibiotics for the infection. Return if increasing pain, vomiting, fever, or inability to tolerate the oral antibiotic.  Follow up with primary physician is indicated if not improving in 2-3 days.       Diagnosis:    ICD-10-CM    1. Acute cystitis with hematuria N30.01        Disposition:  Discharged to home.    Discharge Medications:  New Prescriptions    NITROFURANTOIN, MACROCRYSTAL-MONOHYDRATE, (MACROBID) 100 MG CAPSULE    Take 1 capsule (100 mg) by mouth 2 times daily    PHENAZOPYRIDINE (PYRIDIUM) 200 MG TABLET    Take 1 tablet (200 mg) by mouth 3 times daily for 3 days     Scribe Disclosure:  Jacinta SADLER, am serving as a scribe on 2/24/2018 at 11:11 AM to personally document services performed by Benito Ibarra, * based on my observations and the provider's statements to me.     Jacinta Valdovinos  2/24/2018   [facility] EMERGENCY DEPARTMENT       Benito Ibarra MD  02/27/18 0706

## 2018-02-24 NOTE — ED AVS SNAPSHOT
[facility] Emergency Department    [address]    [facility] [address]    Phone:  [phone]    Fax:  [phone]                                       Terri Ratliff   MRN: [id number]    Department:  [facility] Emergency Department   Date of Visit:  2/24/2018           After Visit Summary Signature Page     I have received my discharge instructions, and my questions have been answered. I have discussed any challenges I see with this plan with the nurse or doctor.    ..........................................................................................................................................  Patient/Patient Representative Signature      ..........................................................................................................................................  Patient Representative Print Name and Relationship to Patient    ..................................................               ................................................  Date                                            Time    ..........................................................................................................................................  Reviewed by Signature/Title    ...................................................              ..............................................  Date                                                            Time

## 2018-02-24 NOTE — ED AVS SNAPSHOT
[facility] Department    [address]    [facility] [address]    Phone:  [phone]    Fax:  [phone]                                       Terri Ratliff   MRN: [id number]    Department:  [facility]   Date of Visit:  2/24/2018           Patient Information     Date Of Birth          1950        Your diagnoses for this visit were:     Acute cystitis with hematuria        You were seen by Benito Ibarra MD.      Follow-up Information     Follow up with Gabriela Acevedo MD In 2 days.    Specialty:  Internal Medicine    Contact information:    [address]  [facility] [address]  [phone]          Discharge Instructions       Discharge Instructions  Urinary Tract Infection  You have urinary tract infection, or UTI. The urinary tract includes the kidneys (which make urine), ureters (the tubes that carry urine from the kidneys to the bladder), the bladder (which stores urine), and urethra (the tube that carries urine out of the bladder).  Urinary tract infections occur when bacteria travel up the urethra into the bladder. We suspect a UTI based on chemical and microscopic findings in your urine, but if there is a question about your findings, we will do a culture to see if bacteria grow. A urine culture takes several days. You should always follow-up with your primary physician to find out about results of your culture if one was done.   Return to the Emergency Department if:    You have severe back pain.    You are vomiting so that you can t take your medicine, or have signs of dehydration (such as urinating less than 3 times per day).    You have fever over 101.5 degrees F.    You have significant confusion or are very weak, or feel very ill.    Your child seems much more ill, won t wake up, won t respond right, or is crying for a long time and won t calm down.    Your child is showing signs of  dehydration, Signs of dehydration can be:  o Your infant has had no wet diapers in 4-5 hours.  o Your older child has not passed urine in 6-8 hours.  o Your infant or child starts to have dry mouth and lips, or no saliva or tears.    Follow-up with your doctor:     Children under 24 months need to be seen by their regular doctor within one week after a diagnosis of a UTI. It may be necessary to do some imaging tests to look at the child s kidney or bladder.    You should begin to feel better within 24 - 48 hours of starting your antibiotic.  If you do not, you need to be seen again.      Treatment:     You will be treated with an antibiotic to kill the bacteria. We have to make an educated guess as to which antibiotic will work for your infection. In most healthy people, we can guess right almost all of the time. Sometimes a culture is done to show which antibiotics will work. This usually takes 2-3 days. When the culture is done, we may have to contact you to put you on a different antibiotic.    Take a pain medication such as Tylenol  (acetaminophen), Advil  (ibuprofen), Nuprin  (ibuprofen), or Aleve  (naproxen). If you have been given a narcotic such as Vicodin  (hydrocodone with acetaminophen), Percocet  (oxycodone with acetaminophen), or codeine, do not drive for four hours after you have taken it. If the narcotic contains Tylenol  (acetaminophen), do not take Tylenol  with it. All narcotics will cause constipation, so eat a high fiber diet.      Pyridium  (phenazopyridine) or Uristat  (phenazopyridine) is a prescription medication that numbs the bladder to reduce the burning pain of some UTIs.  The same medication is available in a non-prescription version called Azo-Standard  (phenazopyridine), Urodol  (phenazopyridine), or other brand names. This medication will change the color of the urine and tears (usually blue or orange). If you wear contacts, do not wear them while taking this medication as they may be  "stained by the medication.    Antibiotic Warning:     If you have been placed on antibiotics - watch for signs of allergic reaction.  These include rash, lip swelling, difficulty breathing, wheezing, and dizziness.  If you develop any of these symptoms, stop the antibiotic immediately and go to an emergency room or urgent care for evaluation.    Probiotics: If you have been given an antibiotic, you may want to also take a probiotic pill or eat yogurt with live cultures. Probiotics have \"good bacteria\" to help your intestines stay healthy. Studies have shown that probiotics help prevent diarrhea and other intestine problems (including C. diff infection) when you take antibiotics. You can buy these without a prescription in the pharmacy section of the store.   If you were given a prescription for medicine here today, be sure to read all of the information (including the package insert) that comes with your prescription.  This will include important information about the medicine, its side effects, and any warnings that you need to know about.  The pharmacist who fills the prescription can provide more information and answer questions you may have about the medicine.  If you have questions or concerns that the pharmacist cannot address, please call or return to the Emergency Department.   Opioid Medication Information    Pain medications are among the most commonly prescribed medicines, so we are including this information for all our patients. If you did not receive pain medication or get a prescription for pain medicine, you can ignore it.     You may have been given a prescription for an opioid (narcotic) pain medicine and/or have received a pain medicine while here in the Emergency Department. These medicines can make you drowsy or impaired. You must not drive, operate dangerous equipment, or engage in any other dangerous activities while taking these medications. If you drive while taking these medications, you " could be arrested for DUI, or driving under the influence. Do not drink any alcohol while you are taking these medications.     Opioid pain medications can cause addiction. If you have a history of chemical dependency of any type, you are at a higher risk of becoming addicted to pain medications.  Only take these prescribed medications to treat your pain when all other options have been tried. Take it for as short a time and as few doses as possible. Store your pain pills in a secure place, as they are frequently stolen and provide a dangerous opportunity for children or visitors in your house to start abusing these powerful medications. We will not replace any lost or stolen medicine.  As soon as your pain is better, you should flush all your remaining medication.     Many prescription pain medications contain Tylenol  (acetaminophen), including Vicodin , Tylenol #3 , Norco , Lortab , and Percocet .  You should not take any extra pills of Tylenol  if you are using these prescription medications or you can get very sick.  Do not ever take more than 3000 mg of acetaminophen in any 24 hour period.    All opioids tend to cause constipation. Drink plenty of water and eat foods that have a lot of fiber, such as fruits, vegetables, prune juice, apple juice and high fiber cereal.  Take a laxative if you don t move your bowels at least every other day. Miralax , Milk of Magnesia, Colace , or Senna  can be used to keep you regular.      Remember that you can always come back to the Emergency Department if you are not able to see your regular doctor in the amount of time listed above, if you get any new symptoms, or if there is anything that worries you.        24 Hour Appointment Hotline       To make an appointment at any [facility], call [phone] ([phone]). If you don't have a family doctor or clinic, we will help you find one. [place] clinics are conveniently located to serve the needs of you and your  family.             Review of your medicines      START taking        Dose / Directions Last dose taken    nitroFURantoin (macrocrystal-monohydrate) 100 MG capsule   Commonly known as:  MACROBID   Dose:  100 mg   Quantity:  14 capsule        Take 1 capsule (100 mg) by mouth 2 times daily   Refills:  0        phenazopyridine 200 MG tablet   Commonly known as:  PYRIDIUM   Dose:  200 mg   Quantity:  9 tablet        Take 1 tablet (200 mg) by mouth 3 times daily for 3 days   Refills:  0          Our records show that you are taking the medicines listed below. If these are incorrect, please call your family doctor or clinic.        Dose / Directions Last dose taken    ascorbic acid 500 MG tablet   Commonly known as:  VITAMIN C        1 TABLET TWICE DAILY   Refills:  0        calcium carbonate antacid 1000 MG Chew        Refills:  0        IBANdronate 150 MG tablet   Commonly known as:  BONIVA   Quantity:  3 tablet        TAKE 1 TABLET EVERY 30 DAYS   Refills:  3        multivitamin per tablet        1 tablet daily   Refills:  0        omega 3 1000 MG Caps   Dose:  1 g   Quantity:  90 capsule        Take 1 g by mouth daily   Refills:  0        simvastatin 40 MG tablet   Commonly known as:  ZOCOR   Dose:  40 mg   Quantity:  90 tablet        Take 1 tablet (40 mg) by mouth At Bedtime   Refills:  3        VITAMIN D PO        None Entered   Refills:  0                Prescriptions were sent or printed at these locations (2 Prescriptions)                   Other Prescriptions                Printed at Department/Unit printer (2 of 2)         nitroFURantoin, macrocrystal-monohydrate, (MACROBID) 100 MG capsule               phenazopyridine (PYRIDIUM) 200 MG tablet                Procedures and tests performed during your visit     UA with Microscopic      Orders Needing Specimen Collection     None      Pending Results     No orders found from 2/22/2018 to 2/25/2018.            Pending Culture Results     No orders found from  2/22/2018 to 2/25/2018.            Pending Results Instructions     If you had any lab results that were not finalized at the time of your Discharge, you can call the ED Lab Result RN at [phone]. You will be contacted by this team for any positive Lab results or changes in treatment. The nurses are available 7 days a week from 10A to 6:30P.  You can leave a message 24 hours per day and they will return your call.        Test Results From Your Hospital Stay        2/24/2018 11:43 AM      Component Results     Component Value Ref Range & Units Status    Color Urine Red  Final    Appearance Urine Cloudy  Final    Glucose Urine Negative NEG^Negative mg/dL Final    Bilirubin Urine Negative NEG^Negative Final    Ketones Urine Negative NEG^Negative mg/dL Final    Specific Gravity Urine 1.027 1.003 - 1.035 Final    Blood Urine Large (A) NEG^Negative Final    pH Urine 5.0 5.0 - 7.0 pH Final    Protein Albumin Urine 100 (A) NEG^Negative mg/dL Final    Urobilinogen mg/dL 0.0 0.0 - 2.0 mg/dL Final    Nitrite Urine Negative NEG^Negative Final    Leukocyte Esterase Urine Negative NEG^Negative Final    Source Midstream Urine  Final    WBC Urine >182 (H) 0 - 2 /HPF Final    RBC Urine >182 (H) 0 - 2 /HPF Final    WBC Clumps Present (A) NEG^Negative /HPF Final    Bacteria Urine Many (A) NEG^Negative /HPF Final    Mucous Urine Present (A) NEG^Negative /LPF Final                Clinical Quality Measure: Blood Pressure Screening     Your blood pressure was checked while you were in the emergency department today. The last reading we obtained was  BP: 147/81 . Please read the guidelines below about what these numbers mean and what you should do about them.  If your systolic blood pressure (the top number) is less than 120 and your diastolic blood pressure (the bottom number) is less than 80, then your blood pressure is normal. There is nothing more that you need to do about it.  If your systolic blood pressure (the top number) is  "120-139 or your diastolic blood pressure (the bottom number) is 80-89, your blood pressure may be higher than it should be. You should have your blood pressure rechecked within a year by a primary care provider.  If your systolic blood pressure (the top number) is 140 or greater or your diastolic blood pressure (the bottom number) is 90 or greater, you may have high blood pressure. High blood pressure is treatable, but if left untreated over time it can put you at risk for heart attack, stroke, or kidney failure. You should have your blood pressure rechecked by a primary care provider within the next 4 weeks.  If your provider in the emergency department today gave you specific instructions to follow-up with your doctor or provider even sooner than that, you should follow that instruction and not wait for up to 4 weeks for your follow-up visit.        Thank you for choosing [facility]       Thank you for choosing [facility] for your care. Our goal is always to provide you with excellent care. Hearing back from our patients is one way we can continue to improve our services. Please take a few minutes to complete the written survey that you may receive in the mail after you visit with us. Thank you!        [employer]     [employer] lets you send messages to your doctor, view your test results, renew your prescriptions, schedule appointments and more. To sign up, go to [email or web address] . Click on \"Log in\" on the left side of the screen, which will take you to the Welcome page. Then click on \"Sign up Now\" on the right side of the page.     You will be asked to enter the access code listed below, as well as some personal information. Please follow the directions to create your username and password.     Your access code is: [email or web address]  Expires: 4/15/2018  8:58 AM     Your access code will  in 90 days. If you need help or a new code, please call your [facility] clinic or [phone].        Care EveryWhere " ID     This is your Care EveryWhere ID. This could be used by other organizations to access your [facility] medical records  [id number]        Equal Access to Services     MELO ADAMSON : Deonte Reyes, giovanni bhatt, yunier bajwa, maria antonia bray. So [facility] [phone].    ATENCIÓN: Si habla español, tiene a jara disposición servicios gratuitos de asistencia lingüística. Llame al [phone].    We comply with applicable federal civil rights laws and [place] laws. We do not discriminate on the basis of race, color, national origin, age, disability, sex, sexual orientation, or gender identity.            After Visit Summary       This is your record. Keep this with you and show to your community pharmacist(s) and doctor(s) at your next visit.

## 2018-02-25 LAB
BACTERIA SPEC CULT: NO GROWTH
SPECIMEN SOURCE: NORMAL

## 2018-02-26 ENCOUNTER — TELEPHONE (OUTPATIENT)
Dept: EMERGENCY MEDICINE | Facility: CLINIC | Age: 68
End: 2018-02-26

## 2018-02-26 NOTE — TELEPHONE ENCOUNTER
"[facility] Emergency Department Lab result notification:    [place] ED lab result protocol used  Urine Culture Protocol    Reason for call  Notify of lab results, assess symptoms,  review ED providers recommendations/discharge instructions (if necessary) and advise per ED lab result f/u protocol    Lab Result  Final urine culture report shows \"NO GROWTH\" and is NEGATIVE.  [place] ED discharge antibiotic: Nitrofurantoin Macrocrystal-Monohydrate (Macrobid) 100 mg PO capsule, 1 capsule (100 mg) by mouth 2 times daily for 7 days.  Is ED discharge Rx antibiotic for UTI only (Yes/No): Yes  Recommendations per [place] ED Lab result protocol - Urine culture protocol.  Information table from ED Provider visit on 02/24/2018  Symptoms reported at ED visit (Chief complaint, HPI) a 67 year old female with a history of Bladder Prolapse w/ vaginal sling in place who presents to the emergency department today with her  for evaluation of hematuria and dysuria. The patient states that today she woke up experiencing burning with urination and she reports that her urine looked dark in color. She denies any vaginal itching or discharge, fevers, chills, flank pain, nausea, vomiting. She denies suffering any recent injuries. The patient denies her bladder prolapsing anymore.    ED providers Impression and Plan (applicable information) Acute cystitis with hematuria   Miscellaneous information Follow up with Gabriela Acevedo MD In 2 days.     RN Assessment (Patient s current Symptoms), include time called.  [Insert Left message here if message left]  At 1216 pt states she is totally fine.   RN Recommendations/Instructions per [place] ED lab result protocol  If symptoms return or continue follow up with pcp.     PCP follow-up Questions asked: No       Swapna Peterson, RN  [place] [facility] RN  Lung Nodule and ED Lab Result F/u RN  Epic pool (ED late result f/u RN): [id number]  FV INCIDENTAL RADIOLOGY F/U " NURSES: [id number]  # [phone]      Copy of Lab result   Exam Date Exam Time Accession # Results    2/24/18 11:00 AM     Component Results   Component Collected Lab   Specimen Description 02/24/2018 11:00    Catheterized Urine   Culture Micro 02/24/2018 11:00    No growth

## 2018-02-27 ENCOUNTER — OFFICE VISIT (OUTPATIENT)
Dept: INTERNAL MEDICINE | Facility: CLINIC | Age: 68
End: 2018-02-27
Payer: MEDICARE

## 2018-02-27 VITALS
DIASTOLIC BLOOD PRESSURE: 78 MMHG | HEIGHT: 64 IN | TEMPERATURE: 98.4 F | HEART RATE: 98 BPM | SYSTOLIC BLOOD PRESSURE: 126 MMHG | OXYGEN SATURATION: 97 % | BODY MASS INDEX: 24.46 KG/M2 | WEIGHT: 143.3 LBS

## 2018-02-27 DIAGNOSIS — N30.01 ACUTE CYSTITIS WITH HEMATURIA: Primary | ICD-10-CM

## 2018-02-27 PROCEDURE — 99213 OFFICE O/P EST LOW 20 MIN: CPT | Performed by: INTERNAL MEDICINE

## 2018-02-27 NOTE — MR AVS SNAPSHOT
"              After Visit Summary   2/27/2018    Terri Ratliff    MRN: [id number]           Patient Information     Date Of Birth          1950        Visit Information        Provider Department      2/27/2018 11:00 AM Gabriela Acevedo MD [facility]        Today's Diagnoses     Acute cystitis with hematuria    -  1      Care Instructions    Plan:  1. Finish the antibiotic   2. Repeat the urine in 2 weeks and 3 weeks   3. If blood, I will send you to urology          Follow-ups after your visit        Future tests that were ordered for you today     Open Standing Orders        Priority Remaining Interval Expires Ordered    UA with Microscopic reflex to Culture Routine 2/2 2/27/2019 2/27/2018            Who to contact     If you have questions or need follow up information about today's clinic visit or your schedule please contact [facility] directly at [phone].  Normal or non-critical lab and imaging results will be communicated to you by MyChart, letter or phone within 4 business days after the clinic has received the results. If you do not hear from us within 7 days, please contact the clinic through MyChart or phone. If you have a critical or abnormal lab result, we will notify you by phone as soon as possible.  Submit refill requests through [employer] or call your pharmacy and they will forward the refill request to us. Please allow 3 business days for your refill to be completed.          Additional Information About Your Visit        MyChart Information     [employer] lets you send messages to your doctor, view your test results, renew your prescriptions, schedule appointments and more. To sign up, go to [email or web address] . Click on \"Log in\" on the left side of the screen, which will take you to the Welcome page. Then click on \"Sign up Now\" on the right side of the page.     You will be asked to enter the access code listed below, as well as some " "personal information. Please follow the directions to create your username and password.     Your access code is: [email or web address]  Expires: 4/15/2018  8:58 AM     Your access code will  in 90 days. If you need help or a new code, please call your [place] clinic or [phone].        Care EveryWhere ID     This is your Care EveryWhere ID. This could be used by other organizations to access your [place] medical records  [id number]        Your Vitals Were     Pulse Temperature Height Pulse Oximetry Breastfeeding? BMI (Body Mass Index)    98 98.4  F (36.9  C) (Oral) 5' 3.5\" (1.613 m) 97% No 24.99 kg/m2       Blood Pressure from Last 3 Encounters:   18 126/78   18 147/81   01/15/18 146/84    Weight from Last 3 Encounters:   18 143 lb 4.8 oz (65 kg)   18 140 lb (63.5 kg)   01/15/18 142 lb 3.2 oz (64.5 kg)               Primary Care Provider Office Phone # Fax #    Gabriela Brandi Acevedo [phone]       [address] [facility] [address]        Equal Access to Services     RONY ADAMSON : Hadii roderick ku hadasho Soomaali, waaxda luqadaha, qaybta kaalmada adeegyada, waxay idiin haykattyn kate sherwood . So [facility] [phone].    ATENCIÓN: Si habla español, tiene a jara disposición servicios gratuitos de asistencia lingüística. Llame al [phone].    We comply with applicable federal civil rights laws and [place] laws. We do not discriminate on the basis of race, color, national origin, age, disability, sex, sexual orientation, or gender identity.            Thank you!     Thank you for choosing [facility]  for your care. Our goal is always to provide you with excellent care. Hearing back from our patients is one way we can continue to improve our services. Please take a few minutes to complete the written survey that you may receive in the mail after your visit with us. Thank you!             Your Updated Medication List - Protect others " around you: Learn how to safely use, store and throw away your medicines at www.disposemymeds.org.          This list is accurate as of 2/27/18 11:28 AM.  Always use your most recent med list.                   Brand Name Dispense Instructions for use Diagnosis    ascorbic acid 500 MG tablet    VITAMIN C     1 TABLET TWICE DAILY        calcium carbonate antacid 1000 MG Chew           IBANdronate 150 MG tablet    BONIVA    3 tablet    TAKE 1 TABLET EVERY 30 DAYS    Osteoporosis, unspecified osteoporosis type, unspecified pathological fracture presence       multivitamin per tablet      1 tablet daily        omega 3 1000 MG Caps     90 capsule    Take 1 g by mouth daily        simvastatin 40 MG tablet    ZOCOR    90 tablet    Take 1 tablet (40 mg) by mouth At Bedtime    Pure hypercholesterolemia       VITAMIN D PO      None Entered

## 2018-02-27 NOTE — PATIENT INSTRUCTIONS
Plan:  1. Finish the antibiotic   2. Repeat the urine in 2 weeks and 3 weeks   3. If blood, I will send you to urology

## 2018-02-27 NOTE — NURSING NOTE
"Chief Complaint   Patient presents with     ER F/U       Initial /78 (BP Location: Right arm, Patient Position: Chair, Cuff Size: Adult Regular)  Pulse 98  Temp 98.4  F (36.9  C) (Oral)  Ht 5' 3.5\" (1.613 m)  Wt 143 lb 4.8 oz (65 kg)  SpO2 97%  Breastfeeding? No  BMI 24.99 kg/m2 Estimated body mass index is 24.99 kg/(m^2) as calculated from the following:    Height as of this encounter: 5' 3.5\" (1.613 m).    Weight as of this encounter: 143 lb 4.8 oz (65 kg).  Medication Reconciliation: complete   Bonnie Gaxiola CMA      "

## 2018-02-27 NOTE — PROGRESS NOTES
Dr Theodore's note      Patient's instructions / PLAN:                                                        Plan:  1. Finish the antibiotic   2. Repeat the urine in 2 weeks and 3 weeks   3. If blood, I will send you to urology        ASSESSMENT & PLAN:                                                      (N30.01) Acute cystitis with hematuria  (primary encounter diagnosis)  Comment:   Plan: UA with Microscopic reflex to Culture               Chief complaint:                                                      F/u ER for urine symptoms     SUBJECTIVE:   Terri Ratliff is a 67 year old female who presents to clinic today for the following health issues:    She was evaluated in the emergency room for painful urination and blood in urine.  She was prescribed nitrofurantoin.  She finished 4 tablets.  She feels much better.  The urine culture came back negative and emergency room call her to stop the antibiotics.    I reviewed the chart.  The UA which showed impressive amount of white blood cells and red blood cells.  I advised her to finish the antibiotics.  We will repeat the urine after she finished antibiotics.  If she has blood we will consider urologic workup    MA note: *ER F/U-LOV 1/15/18. Pt is confused because she was seen in the ER after she was having painful urination and she saw blood in her urine. Was prescribed nitrofurantoin, but when the urine culture came back, the hospital called her and said it did not show a UTI and that she could stop the antibiotics. Not having symptoms now, but she wants to know what could have caused this?   She does have mesh in her bladder, wonders if there may be something wrong with this?         ED/UC Followup:    Facility:  [facility]  Date of visit: 2/24/18  Reason for visit: Burning with urination and dark-colored urine.   Current Status: She did not have symptoms any other day than the day she was seen in the ER, but she wants to know what may have  "caused her symptoms? Concerned that there may be something wrong with the mesh in her bladder?          Review of Systems:                                                      ROS: negative for fever, chills, cough, wheezes, chest pain, shortness of breath, vomiting, abdominal pain, leg swelling         OBJECTIVE:             Physical exam:  Blood pressure 126/78, pulse 98, temperature 98.4  F (36.9  C), temperature source Oral, height 5' 3.5\" (1.613 m), weight 143 lb 4.8 oz (65 kg), SpO2 97 %, not currently breastfeeding.   NAD, appears comfortable  Chest: clear to auscultation bilaterally, good respiratory effort  Heart: S1 S2, RRR, no mgr appreciated  Abdomen: soft, not tender,  Extremities: no edema,  Neurologic: A, Ox3, no focal signs appreciated    PMHx: reviewed  Past Medical History:   Diagnosis Date     Osteoporosis       PSHx: reviewed  Past Surgical History:   Procedure Laterality Date     BLADDER SURGERY  2009    sling      C APPENDECTOMY       COLONOSCOPY  2005     [facility]     COLONOSCOPY N/A 9/17/2015    Procedure: COLONOSCOPY;  Surgeon: Ted Mccarthy MD;  Location:  GI     GYN SURGERY       HYSTERECTOMY, PAP NO LONGER INDICATED      ovaries are present     KNEE SURGERY       ORTHOPEDIC SURGERY          Meds: reviewed  Current Outpatient Prescriptions   Medication Sig Dispense Refill     IBANdronate (BONIVA) 150 MG tablet TAKE 1 TABLET EVERY 30 DAYS 3 tablet 3     simvastatin (ZOCOR) 40 MG tablet Take 1 tablet (40 mg) by mouth At Bedtime 90 tablet 3     calcium carbonate antacid 1000 MG CHEW        omega 3 1000 MG CAPS Take 1 g by mouth daily 90 capsule      VITAMIN D OR None Entered       MULTIVITAMINS OR TABS 1 tablet daily       VITAMIN C 500 MG OR TABS 1 TABLET TWICE DAILY         Soc Hx: reviewed  Fam Hx: reviewed          Gabriela Theodore MD  Internal Medicine      "

## 2018-03-13 DIAGNOSIS — N30.01 ACUTE CYSTITIS WITH HEMATURIA: ICD-10-CM

## 2018-03-13 LAB
ALBUMIN UR-MCNC: NEGATIVE MG/DL
APPEARANCE UR: CLEAR
BILIRUB UR QL STRIP: NEGATIVE
COLOR UR AUTO: YELLOW
GLUCOSE UR STRIP-MCNC: NEGATIVE MG/DL
HGB UR QL STRIP: NEGATIVE
KETONES UR STRIP-MCNC: NEGATIVE MG/DL
LEUKOCYTE ESTERASE UR QL STRIP: NEGATIVE
NITRATE UR QL: NEGATIVE
PH UR STRIP: 5 PH (ref 5–7)
RBC #/AREA URNS AUTO: NORMAL /HPF
SOURCE: NORMAL
SP GR UR STRIP: <=1.005 (ref 1–1.03)
UROBILINOGEN UR STRIP-ACNC: 0.2 EU/DL (ref 0.2–1)
WBC #/AREA URNS AUTO: NORMAL /HPF

## 2018-03-13 PROCEDURE — 81001 URINALYSIS AUTO W/SCOPE: CPT | Performed by: INTERNAL MEDICINE

## 2018-03-16 ENCOUNTER — OFFICE VISIT (OUTPATIENT)
Dept: INTERNAL MEDICINE | Facility: CLINIC | Age: 68
End: 2018-03-16
Payer: MEDICARE

## 2018-03-16 ENCOUNTER — THERAPY VISIT (OUTPATIENT)
Dept: PHYSICAL THERAPY | Facility: CLINIC | Age: 68
End: 2018-03-16
Payer: MEDICARE

## 2018-03-16 VITALS
HEIGHT: 64 IN | DIASTOLIC BLOOD PRESSURE: 80 MMHG | TEMPERATURE: 98.3 F | BODY MASS INDEX: 24.19 KG/M2 | SYSTOLIC BLOOD PRESSURE: 130 MMHG | WEIGHT: 141.7 LBS | OXYGEN SATURATION: 97 % | HEART RATE: 77 BPM

## 2018-03-16 DIAGNOSIS — M54.59 MECHANICAL LOW BACK PAIN: Primary | ICD-10-CM

## 2018-03-16 DIAGNOSIS — M54.41 ACUTE RIGHT-SIDED LOW BACK PAIN WITH RIGHT-SIDED SCIATICA: Primary | ICD-10-CM

## 2018-03-16 PROCEDURE — 97110 THERAPEUTIC EXERCISES: CPT | Mod: GP | Performed by: PHYSICAL THERAPIST

## 2018-03-16 PROCEDURE — 97112 NEUROMUSCULAR REEDUCATION: CPT | Mod: GP | Performed by: PHYSICAL THERAPIST

## 2018-03-16 PROCEDURE — G8982 BODY POS GOAL STATUS: HCPCS | Mod: GP | Performed by: PHYSICAL THERAPIST

## 2018-03-16 PROCEDURE — G8981 BODY POS CURRENT STATUS: HCPCS | Mod: GP | Performed by: PHYSICAL THERAPIST

## 2018-03-16 PROCEDURE — 99214 OFFICE O/P EST MOD 30 MIN: CPT | Performed by: INTERNAL MEDICINE

## 2018-03-16 PROCEDURE — 97161 PT EVAL LOW COMPLEX 20 MIN: CPT | Mod: GP | Performed by: PHYSICAL THERAPIST

## 2018-03-16 RX ORDER — BACLOFEN 10 MG/1
10-20 TABLET ORAL
Qty: 30 TABLET | Refills: 0 | Status: SHIPPED | OUTPATIENT
Start: 2018-03-16 | End: 2019-02-08

## 2018-03-16 NOTE — PROGRESS NOTES
"[facility] Initial Evaluation -- Lumbar    Date: March 16, 2018  Terri Ratliff is a 67 year old female with a lumbar condition.   Referral: Dr. Theodore  Employment/Workability(Lost work days):  retired  Formal Exerciser (Y/N):  Y, TM, yoga, weight lifting  Leisure Mechanical Stresses: see above  Functional disability score (TEDDY/STarT Back):  See flow sheet  Visual Analog Score (VAS 0-10): 7/10  Patient goals/expectations:  \"to get rid of the pain\"    HISTORY:    Present symptoms: central back  Pain quality (Sharp/shooting/stabbing/aching/burning/cramping):  Aching  Paresthesia (yes/no):  no    DFO < 22 days (Y/N):  Y, a week ago (March 9, 2018).   Symptoms (improving/unchanging/worsening):  unchanging.   Symptoms commenced as a result of: no apparent reason     Symptoms at onset(back/thigh/leg): back  Constant symptoms(back/thigh/leg):   Intermittent symptoms(back/thigh/leg): intermittent    Symptoms are made worse with the following: Always Bending, Always Sitting and Time of day - Always AM   Symptoms are made better with the following: Always Walking    Disturbed sleep (yes/no):  no Sleeping postures (prone/sup/side R/L): L side    Previous episodes (0/1-5/6-10/11+): 3-5 Year of first episode:     Previous history: episodic back pain, surgery for disc hernitation  Previous treatments: surgery,  no      Specific Questions:  Cough/Sneeze/Strain (Pos/Neg): neg  Bowel/Bladder (Normal/Abnormal): normal  Gait (Normal/Abnormal): normal  Medications (Nil/NSAIDS/Analg/Steroids/Anticoag/Other):  None  Medical allergies:  none  General Health (Excellent/Good/Fair/Poor):  good  Pertinent medical history:  Osteoporosis  Imaging (NA/Xray/MRI):  none  Recent or major surgery (Yes/No):  no  Night pain (Yes/No): none  Accidents (Yes/No): none  Unexplained weight loss (Yes/No): none  Barriers at home: none  Other red flags: none    EXAMINATION    Posture:   Sitting(Good/Fair/Poor): " fair  Standing(Good/Fair/Poor):fair  Lordosis (Red/Acc/Normal): red  Correction of posture(Better/Worse/NE): better    Lateral Shift (Right/Left/Nil): nil  Relevant (Yes/No):  no  Other Observations: none    Neurological:    Motor Deficit:    Reflexes:    Sensory Deficit:    Dural Signs:      Movement Loss:   Carlos Mod Min Nil Pain   Flexion   x  pdm   Extension   x  Decr pain   Side Gliding R    x    Side Gliding L    x      Test Movements:   During: produces, abolishes, increases, decreases, no effect, centralizing, peripheralizing   After: better, worse, no better, no worse, no effect, centralized, peripheralized    Pre-test Symptoms Standing:    Symptoms During Symptoms After ROM increased ROM decreased No Effect   FIS        Rep FIS        EIS        Rep EIS        Pre-test Symptoms Lying: right low back, R lateral thigh    Symptoms During Symptoms After ROM increased ROM decreased No Effect   LALY        Rep LALY        EIL  EIL + R SG Increases    No Worse         Rep EIL  Rep EIL + R SG Increases    Worse      x   If required Pre-test Symptoms: R lateral thigh   Symptoms During Symptoms After ROM increased ROM decreased No Effect   SGIS - R Increases    No Worse         Rep SGIS - R Increases    Better    x     SGIS - L        Rep SGIS - L          Static Tests:  Sitting Slouched:    Sitting erect:    Standing Slouched   Standing erect:    Lying prone in extension:   Long sitting:      Other Tests:     Provisional Classification:  Derangement - Asymmetrical, unilateral, symptoms above knee    Principle of Management:  Education:  Posture, DP centralization, traffic light  Equipment provided:  Lumbar roll  Mechanical therapy (Y/N):  Y  Extension principle:    Lateral Principle:  Rep R SGIS x 10/2 hrs  Flexion principle:    Other:      ASSESSMENT/PLAN    Patient is a 67 year old female with lumbar complaints.    Patient has the following significant findings with corresponding treatment plan.                 Diagnosis 1:  Mechanical low back pain  Pain -  self management, education, directional preference exercise and home program  Decreased ROM/flexibility - manual therapy and therapeutic exercise  Decreased function - therapeutic activities  Impaired posture - neuro re-education    Therapy Evaluation Codes:   1) History comprised of:   Personal factors that impact the plan of care:      None.    Comorbidity factors that impact the plan of care are:      osteroporosis.     Medications impacting care: Bone density.  2) Examination of Body Systems comprised of:   Body structures and functions that impact the plan of care:      Lumbar spine.   Activity limitations that impact the plan of care are:      Bending and Sitting.  3) Clinical presentation characteristics are:   Stable/Uncomplicated.  4) Decision-Making    Low complexity using standardized patient assessment instrument and/or measureable assessment of functional outcome.  Cumulative Therapy Evaluation is: Low complexity.    Previous and current functional limitations:  (See Goal Flow Sheet for this information)    Short term and Long term goals: (See Goal Flow Sheet for this information)     Communication ability:  Patient appears to be able to clearly communicate and understand verbal and written communication and follow directions correctly.  Treatment Explanation - The following has been discussed with the patient:   RX ordered/plan of care  Anticipated outcomes  Possible risks and side effects  This patient would benefit from PT intervention to resume normal activities.   Rehab potential is good.    Frequency:  1 X week, once daily  Duration:  for 4 weeks  Discharge Plan:  Achieve all LTG.  Independent in home treatment program.  Reach maximal therapeutic benefit.    Please refer to the daily flowsheet for treatment today, total treatment time and time spent performing 1:1 timed codes.

## 2018-03-16 NOTE — PROGRESS NOTES
"Dr Theodore's note      Patient's instructions / PLAN:                                                        Plan:  1. Jeni Duff, [phone], dial 0  - physical therapist at [facility], [place]   2. Ibuprofen 600 mg 3 times a day for 3 days then take it only as needed   3. Baclofen 10 mg take 1-2 tabs at night as needed for muscle relaxant       ASSESSMENT & PLAN:                                                      (M54.41) Acute right-sided low back pain with right-sided sciatica  (primary encounter diagnosis)  Comment:   Plan: [facility],         baclofen (LIORESAL) 10 MG tablet               Chief complaint:                                                      Low back pain    SUBJECTIVE:   Terri Ratliff is a 67 year old female who presents to clinic today for the following health issues:        *Low back Pain  --X 1 week.  -- No trauma   -- Lower right side of back, radiates down right leg.   -- Doesn't hurt when she walks, but is painful when she sits and when she was trying to do yoga.   -- No numbness tingling weaknessin her legs, no changes in bowel movement or urine  --She has tried only a few tablets of Tylenol  --Sometimes it hurts at night when she rolls in bed.    History of urinary infection with hematuria  The repeat UA done on March 13does not show any RBC.  We do not need further workup.  She has no urine symptoms          Review of Systems:                                                      ROS: negative for fever, chills, cough, wheezes, chest pain, shortness of breath, vomiting, abdominal pain, leg swelling     ROS: no fever, chills, no flank pain, no abdominal pain, no loss of control of bowel or bladder, no numbness, tingling, numbness of legs. No hematuria. No central back pain.      OBJECTIVE:             Physical exam:  Blood pressure 130/80, pulse 77, temperature 98.3  F (36.8  C), temperature source Oral, height 5' 3.5\" (1.613 m), weight " 141 lb 11.2 oz (64.3 kg), SpO2 97 %, not currently breastfeeding.   NAD, appears comfortable  Skin: no rashes   Abdomen: soft, not tender, no CVA tenderness  Neurologic: A, Ox3, no focal signs appreciated  Musculoskeletal: the paraspinal muscles in the lumbar are tender and tense on palpation mostly on the right side    PMHx: reviewed  Past Medical History:   Diagnosis Date     Osteoporosis       PSHx: reviewed  Past Surgical History:   Procedure Laterality Date     BLADDER SURGERY  2009    sling      C APPENDECTOMY       COLONOSCOPY  2005     COLONOSCOPY  9/17/2015    Dr. Mccarthy [facility]     COLONOSCOPY N/A 9/17/2015    Procedure: COLONOSCOPY;  Surgeon: Ted Mccarthy MD;  Location:  GI     GYN SURGERY       HYSTERECTOMY, PAP NO LONGER INDICATED      ovaries are present     KNEE SURGERY       ORTHOPEDIC SURGERY          Meds: reviewed  Current Outpatient Prescriptions   Medication Sig Dispense Refill     IBANdronate (BONIVA) 150 MG tablet TAKE 1 TABLET EVERY 30 DAYS 3 tablet 3     simvastatin (ZOCOR) 40 MG tablet Take 1 tablet (40 mg) by mouth At Bedtime 90 tablet 3     calcium carbonate antacid 1000 MG CHEW        omega 3 1000 MG CAPS Take 1 g by mouth daily 90 capsule      VITAMIN D OR None Entered       MULTIVITAMINS OR TABS 1 tablet daily       VITAMIN C 500 MG OR TABS 1 TABLET TWICE DAILY         Soc Hx: reviewed  Fam Hx: reviewed          Gabriela Theodore MD  Internal Medicine

## 2018-03-16 NOTE — MR AVS SNAPSHOT
"              After Visit Summary   3/16/2018    Terri Ratliff    MRN: [id number]           Patient Information     Date Of Birth          1950        Visit Information        Provider Department      3/16/2018 10:20 AM Jeni Duff PT [facility] [place] [facility]        Today's Diagnoses     Mechanical low back pain    -  1       Follow-ups after your visit        Your next 10 appointments already scheduled     Mar 23, 2018  7:40 AM CDT   [facility] ([facility]  )    [address] [address] [address]   [phone]              Who to contact     If you have questions or need follow up information about today's clinic visit or your schedule please contact [facility] directly at [phone].  Normal or non-critical lab and imaging results will be communicated to you by Opalityhart, letter or phone within 4 business days after the clinic has received the results. If you do not hear from us within 7 days, please contact the clinic through MyChart or phone. If you have a critical or abnormal lab result, we will notify you by phone as soon as possible.  Submit refill requests through AFTER-MOUSE or call your pharmacy and they will forward the refill request to us. Please allow 3 business days for your refill to be completed.          Additional Information About Your Visit        MyChart Information     AFTER-MOUSE lets you send messages to your doctor, view your test results, renew your prescriptions, schedule appointments and more. To sign up, go to [email or web address] . Click on \"Log in\" on the left side of the screen, which will take you to the Welcome page. Then click on \"Sign up Now\" on the right side of the page.     You will be asked to enter the access code listed below, as well as some personal information. Please follow the directions to create your username and password.     Your access code is: [email or web address]  Expires: 4/15/2018  9:58 AM   "   Your access code will  in 90 days. If you need help or a new code, please call your [place] clinic or [phone].        Care EveryWhere ID     This is your Care EveryWhere ID. This could be used by other organizations to access your [place] medical records  [id number]         Blood Pressure from Last 3 Encounters:   18 130/80   18 126/78   18 147/81    Weight from Last 3 Encounters:   18 64.3 kg (141 lb 11.2 oz)   18 65 kg (143 lb 4.8 oz)   18 63.5 kg (140 lb)              We Performed the Following     [facility] PT EVAL, LOW COMPLEXITY     [facility] CERT REPORT     [facility] INITIAL EVAL REPORT     NEUROMUSCULAR RE-EDUCATION     THERAPEUTIC EXERCISES          Today's Medication Changes          These changes are accurate as of 3/16/18 11:00 AM.  If you have any questions, ask your nurse or doctor.               Start taking these medicines.        Dose/Directions    baclofen 10 MG tablet   Commonly known as:  LIORESAL   Used for:  Acute right-sided low back pain with right-sided sciatica   Started by:  Gabriela Acevedo MD        Dose:  10-20 mg   Take 1-2 tablets (10-20 mg) by mouth nightly as needed for muscle spasms   Quantity:  30 tablet   Refills:  0            Where to get your medicines      These medications were sent to [facility] [address] [place], [place] [id number]     Phone:  [phone]     baclofen 10 MG tablet                Primary Care Provider Office Phone # Fax #    Gabriela Acevedo [phone]       [address] [place] [address]        Equal Access to Services     [facility] AH: Hadii roderick Reyes, waturnerda luqadaha, qaybta kaalmada garett, maria antonia bray. So [facility] [phone].    ATENCIÓN: Si habla español, tiene a jara disposición servicios gratuitos de asistencia lingüística. Llame al [phone].    We comply  with applicable federal civil rights laws and [place] laws. We do not discriminate on the basis of race, color, national origin, age, disability, sex, sexual orientation, or gender identity.            Thank you!     Thank you for choosing [facility]  for your care. Our goal is always to provide you with excellent care. Hearing back from our patients is one way we can continue to improve our services. Please take a few minutes to complete the written survey that you may receive in the mail after your visit with us. Thank you!             Your Updated Medication List - Protect others around you: Learn how to safely use, store and throw away your medicines at www.disposemymeds.org.          This list is accurate as of 3/16/18 11:00 AM.  Always use your most recent med list.                   Brand Name Dispense Instructions for use Diagnosis    ascorbic acid 500 MG tablet    VITAMIN C     1 TABLET TWICE DAILY        baclofen 10 MG tablet    LIORESAL    30 tablet    Take 1-2 tablets (10-20 mg) by mouth nightly as needed for muscle spasms    Acute right-sided low back pain with right-sided sciatica       calcium carbonate antacid 1000 MG Chew           IBANdronate 150 MG tablet    BONIVA    3 tablet    TAKE 1 TABLET EVERY 30 DAYS    Osteoporosis, unspecified osteoporosis type, unspecified pathological fracture presence       multivitamin per tablet      1 tablet daily        omega 3 1000 MG Caps     90 capsule    Take 1 g by mouth daily        simvastatin 40 MG tablet    ZOCOR    90 tablet    Take 1 tablet (40 mg) by mouth At Bedtime    Pure hypercholesterolemia       VITAMIN D PO      None Entered

## 2018-03-16 NOTE — LETTER
"DEPARTMENT OF HEALTH AND HUMAN SERVICES  CENTERS FOR MEDICARE & MEDICAID SERVICES    PLAN/UPDATED PLAN OF PROGRESS FOR OUTPATIENT REHABILITATION    PATIENTS NAME:  Terri Ratliff   : 1950  PROVID[facility] NUMBER:    [id number]  [id number]  PROVIDER NAME: [facility] PT  MEDICAL RECORD NUMBER: [id number]     START OF CARE DATE:  SOC Date: 18   TYPE:  PT    PRIMARY/TREATMENT DIAGNOSIS: (Pertinent Medical Diagnosis)  Mechanical low back pain    VISITS FROM START OF CARE:  Rxs Used: 1     [facility] Initial Evaluation -- Lumbar    Date: 2018  Terri Ratliff is a 67 year old female with a lumbar condition.   Referral: Dr. Theodore  Employment/Workability(Lost work days):  retired  Formal Exerciser (Y/N):  Y, TM, yoga, weight lifting  Leisure Mechanical Stresses: see above  Functional disability score (TEDDY/STarT Back):  See flow sheet  Visual Analog Score (VAS 0-10): 7/10  Patient goals/expectations:  \"to get rid of the pain\"    HISTORY:    Present symptoms: central back  Pain quality (Sharp/shooting/stabbing/aching/burning/cramping):  Aching  Paresthesia (yes/no):  no    DFO < 22 days (Y/N):  Y, a week ago (2018).   Symptoms (improving/unchanging/worsening):  unchanging.   Symptoms commenced as a result of: no apparent reason     Symptoms at onset(back/thigh/leg): back  Constant symptoms(back/thigh/leg):   Intermittent symptoms(back/thigh/leg): intermittent    Symptoms are made worse with the following: Always Bending, Always Sitting and Time of day - Always AM   Symptoms are made better with the following: Always Walking    Disturbed sleep (yes/no):  no Sleeping postures (prone/sup/side R/L): L side    Previous episodes (0/1-5/6-10/11+): 3-5 Year of first episode:     Previous history: episodic back pain, surgery for disc hernitation    Previous treatments: surgery,  no    Terri Ratliff   : 1950    Specific Questions:  Cough/Sneeze/Strain " (Pos/Neg): neg  Bowel/Bladder (Normal/Abnormal): normal  Gait (Normal/Abnormal): normal  Medications (Nil/NSAIDS/Analg/Steroids/Anticoag/Other):  None  Medical allergies:  none  General Health (Excellent/Good/Fair/Poor):  good  Pertinent medical history:  Osteoporosis  Imaging (NA/Xray/MRI):  none  Recent or major surgery (Yes/No):  no  Night pain (Yes/No): none  Accidents (Yes/No): none  Unexplained weight loss (Yes/No): none  Barriers at home: none  Other red flags: none    EXAMINATION  Posture:   Sitting(Good/Fair/Poor): fair  Standing(Good/Fair/Poor):fair  Lordosis (Red/Acc/Normal): red  Correction of posture(Better/Worse/NE): better  Lateral Shift (Right/Left/Nil): nil  Relevant (Yes/No):  no  Other Observations: none  Neurological:  Motor Deficit:    Reflexes:    Sensory Deficit:    Dural Signs:    Movement Loss:   Carlos Mod Min Nil Pain   Flexion   x  pdm   Extension   x  Decr pain   Side Gliding R    x    Side Gliding L    x    Test Movements:   During: produces, abolishes, increases, decreases, no effect, centralizing, peripheralizing   After: better, worse, no better, no worse, no effect, centralized, peripheralized  Pre-test Symptoms Standing:    Symptoms During Symptoms After ROM increased ROM decreased No Effect   FIS        Rep FIS        EIS        Rep EIS        Pre-test Symptoms Lying: right low back, R lateral thigh    Symptoms During Symptoms After ROM increased ROM decreased No Effect   LALY        Rep LALY        EIL  EIL + R SG Increases    No Worse         Rep EIL  Rep EIL + R SG Increases    Worse      x     Terri Ratliff   : 1950    If required Pre-test Symptoms: R lateral thigh   Symptoms During Symptoms After ROM increased ROM decreased No Effect   SGIS - R Increases    No Worse         Rep SGIS - R Increases    Better    x     SGIS - L        Rep SGIS - L          Static Tests:  Sitting Slouched:    Sitting erect:    Standing Slouched   Standing erect:    Lying prone in extension:    Long sitting:      Other Tests:     Provisional Classification:  Derangement - Asymmetrical, unilateral, symptoms above knee    Principle of Management:  Education:  Posture, DP centralization, traffic light  Equipment provided:  Lumbar roll  Mechanical therapy (Y/N):  Y  Extension principle:    Lateral Principle:  Rep R SGIS x 10/2 hrs  Flexion principle:    Other:      ASSESSMENT/PLAN  Patient is a 67 year old female with lumbar complaints.    Patient has the following significant findings with corresponding treatment plan.                Diagnosis 1:  Mechanical low back pain  Pain -  self management, education, directional preference exercise and home program  Decreased ROM/flexibility - manual therapy and therapeutic exercise  Decreased function - therapeutic activities  Impaired posture - neuro re-education    Therapy Evaluation Codes:   1) History comprised of:   Personal factors that impact the plan of care:      None.    Comorbidity factors that impact the plan of care are:      osteroporosis.     Medications impacting care: Bone density.  2) Examination of Body Systems comprised of:   Body structures and functions that impact the plan of care:      Lumbar spine.   Activity limitations that impact the plan of care are:      Bending and Sitting.  3) Clinical presentation characteristics are:   Stable/Uncomplicated.  4) Decision-Making    Low complexity using standardized patient assessment instrument and/or measureable assessment of functional outcome.  Cumulative Therapy Evaluation is: Low complexity.    Terri Ratliff   : 1950    Previous and current functional limitations:  (See Goal Flow Sheet for this information)    Short term and Long term goals: (See Goal Flow Sheet for this information)     Communication ability:  Patient appears to be able to clearly communicate and understand verbal and written communication and follow directions correctly.  Treatment Explanation - The following has  "been discussed with the patient:   RX ordered/plan of care  Anticipated outcomes  Possible risks and side effects  This patient would benefit from PT intervention to resume normal activities.   Rehab potential is good.    Frequency:  1 X week, once daily  Duration:  for 4 weeks  Discharge Plan:  Achieve all LTG.  Independent in home treatment program.  Reach maximal therapeutic benefit.      Caregiver Signature/Credentials _____________________________ Date ________      Treating Provider: Jeni Duff, PT, Cert, MDT      I have reviewed and certified the need for these services and plan of treatment while under my care.        PHYSICIAN'S SIGNATURE:   ______________________________________ Date___________       Gabriela Acevedo MD    Certification period:  Beginning of Cert date period: 03/16/18 to  End of Cert period date: 06/14/18     Functional Level Progress Report: Please see attached \"Goal Flow sheet for Functional level.\"    ____X____ Continue Services or       ________ DC Services                Service dates: From  SOC Date: 03/16/18 date to present                         "

## 2018-03-16 NOTE — PATIENT INSTRUCTIONS
Plan:  1. Jeni Duff, [phone], dial 0  - physical therapist at [facility], [place]   2. Ibuprofen 600 mg 3 times a day for 3 days then take it only as needed   3. Baclofen 10 mg take 1-2 tabs at night as needed for muscle relaxant

## 2018-03-16 NOTE — MR AVS SNAPSHOT
After Visit Summary   3/16/2018    Terri Ratliff    MRN: [id number]           Patient Information     Date Of Birth          1950        Visit Information        Provider Department      3/16/2018 8:40 AM Gabriela Acevedo MD [facility]        Today's Diagnoses     Acute right-sided low back pain with right-sided sciatica    -  1      Care Instructions    Plan:  1. Jeni Duff, [phone], dial 0  [facility]   2. Ibuprofen 600 mg 3 times a day for 3 days then take it only as needed   3. Baclofen 10 mg take 1-2 tabs at night as needed for muscle relaxant           Follow-ups after your visit        Additional Services     [facility], HAND, AND CHIROPRACTIC REFERRAL       === This order will print in the [facility] Scheduling  Office ===    Your provider has referred you to: [facility], [phone]     Indication/Reason for Referral: LBP  Onset of Illness:  1 week  Therapy Orders: Physical Therapy per protocol or clinical pathway.  Special Programs None   Special Request:Eval & Treat  Modalities: As indicated  Equipment: As indicated    Additional Comments:       Please be aware that coverage of these services is subject to the terms and limitations of your health insurance plan.  Call member services at your health plan with any benefit or coverage questions.      Please bring the following to your appointment:    >>   Any x-rays, CTs or MRIs which have been performed.  Contact the facility where they were done to arrange for  prior to your scheduled appointment.  Any new CT, MRI or other procedures ordered by your specialist must be performed at a [facility] facility or coordinated by your clinic's referral office.    >>   List of current medications   >>   This referral request   >>   Any documents/labs given to you for this referral                  Who to contact     If you have questions or need  "follow up information about today's clinic visit or your schedule please contact [facility] directly at [phone].  Normal or non-critical lab and imaging results will be communicated to you by YPlanhart, letter or phone within 4 business days after the clinic has received the results. If you do not hear from us within 7 days, please contact the clinic through YPlanhart or phone. If you have a critical or abnormal lab result, we will notify you by phone as soon as possible.  Submit refill requests through Micreos or call your pharmacy and they will forward the refill request to us. Please allow 3 business days for your refill to be completed.          Additional Information About Your Visit        YPlanharY'all Information     Micreos lets you send messages to your doctor, view your test results, renew your prescriptions, schedule appointments and more. To sign up, go to [email or web address] . Click on \"Log in\" on the left side of the screen, which will take you to the Welcome page. Then click on \"Sign up Now\" on the right side of the page.     You will be asked to enter the access code listed below, as well as some personal information. Please follow the directions to create your username and password.     Your access code is: [email or web address]  Expires: 4/15/2018  9:58 AM     Your access code will  in 90 days. If you need help or a new code, please call your [facility] clinic or [phone].        Care EveryWhere ID     This is your Care EveryWhere ID. This could be used by other organizations to access your [facility] medical records  [id number]        Your Vitals Were     Pulse Temperature Height Pulse Oximetry Breastfeeding? BMI (Body Mass Index)    77 98.3  F (36.8  C) (Oral) 5' 3.5\" (1.613 m) 97% No 24.71 kg/m2       Blood Pressure from Last 3 Encounters:   18 130/80   18 126/78   18 147/81    Weight from Last 3 Encounters:   18 141 lb 11.2 oz (64.3 kg)   18 143 lb " 4.8 oz (65 kg)   02/24/18 140 lb (63.5 kg)              We Performed the Following     [facility] PT, HAND, AND CHIROPRACTIC REFERRAL          Today's Medication Changes          These changes are accurate as of 3/16/18  8:58 AM.  If you have any questions, ask your nurse or doctor.               Start taking these medicines.        Dose/Directions    baclofen 10 MG tablet   Commonly known as:  LIORESAL   Used for:  Acute right-sided low back pain with right-sided sciatica   Started by:  Gabriela Acevedo MD        Dose:  10-20 mg   Take 1-2 tablets (10-20 mg) by mouth nightly as needed for muscle spasms   Quantity:  30 tablet   Refills:  0            Where to get your medicines      These medications were sent to [facility] [address] [place] [address]     Phone:  [phone]     baclofen 10 MG tablet                Primary Care Provider Office Phone # Fax #    Gabriela Acevedo [phone]       [address] [place] [address]        Equal Access to Services     [facility]: Hadii aad ku hadasho Soomaali, waaxda luqadaha, qaybta kaalmada adeegyada, maria antonia strickland haykattyn kate sherwood . So [facility] [phone].    ATENCIÓN: Si habla español, tiene a jara disposición servicios gratuitos de asistencia lingüística. Llame al [phone].    We comply with applicable federal civil rights laws and [place] laws. We do not discriminate on the basis of race, color, national origin, age, disability, sex, sexual orientation, or gender identity.            Thank you!     Thank you for choosing [facility]  for your care. Our goal is always to provide you with excellent care. Hearing back from our patients is one way we can continue to improve our services. Please take a few minutes to complete the written survey that you may receive in the mail after your visit with us. Thank you!              Your Updated Medication List - Protect others around you: Learn how to safely use, store and throw away your medicines at www.disposemymeds.org.          This list is accurate as of 3/16/18  8:58 AM.  Always use your most recent med list.                   Brand Name Dispense Instructions for use Diagnosis    ascorbic acid 500 MG tablet    VITAMIN C     1 TABLET TWICE DAILY        baclofen 10 MG tablet    LIORESAL    30 tablet    Take 1-2 tablets (10-20 mg) by mouth nightly as needed for muscle spasms    Acute right-sided low back pain with right-sided sciatica       calcium carbonate antacid 1000 MG Chew           IBANdronate 150 MG tablet    BONIVA    3 tablet    TAKE 1 TABLET EVERY 30 DAYS    Osteoporosis, unspecified osteoporosis type, unspecified pathological fracture presence       multivitamin per tablet      1 tablet daily        omega 3 1000 MG Caps     90 capsule    Take 1 g by mouth daily        simvastatin 40 MG tablet    ZOCOR    90 tablet    Take 1 tablet (40 mg) by mouth At Bedtime    Pure hypercholesterolemia       VITAMIN D PO      None Entered

## 2018-03-16 NOTE — NURSING NOTE
"Chief Complaint   Patient presents with     Back Pain       Initial /80 (BP Location: Right arm, Patient Position: Chair, Cuff Size: Adult Large)  Pulse 77  Temp 98.3  F (36.8  C) (Oral)  Ht 5' 3.5\" (1.613 m)  Wt 141 lb 11.2 oz (64.3 kg)  SpO2 97%  Breastfeeding? No  BMI 24.71 kg/m2 Estimated body mass index is 24.71 kg/(m^2) as calculated from the following:    Height as of this encounter: 5' 3.5\" (1.613 m).    Weight as of this encounter: 141 lb 11.2 oz (64.3 kg).  Medication Reconciliation: complete   Bonnie Gaxiola CMA      "

## 2018-03-23 ENCOUNTER — THERAPY VISIT (OUTPATIENT)
Dept: PHYSICAL THERAPY | Facility: CLINIC | Age: 68
End: 2018-03-23
Payer: MEDICARE

## 2018-03-23 DIAGNOSIS — M54.59 MECHANICAL LOW BACK PAIN: ICD-10-CM

## 2018-03-23 PROCEDURE — 97112 NEUROMUSCULAR REEDUCATION: CPT | Mod: GP | Performed by: PHYSICAL THERAPIST

## 2018-03-23 PROCEDURE — 97110 THERAPEUTIC EXERCISES: CPT | Mod: GP | Performed by: PHYSICAL THERAPIST

## 2018-03-27 ENCOUNTER — TELEPHONE (OUTPATIENT)
Dept: INTERNAL MEDICINE | Facility: CLINIC | Age: 68
End: 2018-03-27

## 2019-01-22 DIAGNOSIS — M81.0 OSTEOPOROSIS, UNSPECIFIED OSTEOPOROSIS TYPE, UNSPECIFIED PATHOLOGICAL FRACTURE PRESENCE: ICD-10-CM

## 2019-01-22 NOTE — TELEPHONE ENCOUNTER
"Requested Prescriptions   Pending Prescriptions Disp Refills     IBANdronate (BONIVA) 150 MG tablet [Pharmacy Med Name: BONIVA TABS 3'S 150MG]  Last Written Prescription Date:  1/15/2018  Last Fill Quantity: 3,  # refills: 3   Last office visit: 3/16/2018 with prescribing provider:     Future Office Visit:   3 tablet 3     Sig: TAKE 1 TABLET EVERY 30 DAYS    Bisphosphonates Failed - 1/22/2019  9:51 AM       Failed - Dexa on file within past 2 years    Please review last Dexa result.          Failed - Normal serum creatinine on file within past 12 months    Recent Labs   Lab Test 01/15/18  0854   CR 0.78            Passed - Recent (12 mo) or future (30 days) visit within the authorizing provider's specialty    Patient had office visit in the last 12 months or has a visit in the next 30 days with authorizing provider or within the authorizing provider's specialty.  See \"Patient Info\" tab in inbasket, or \"Choose Columns\" in Meds & Orders section of the refill encounter.             Passed - Medication is active on med list       Passed - Patient is age 18 or older        "

## 2019-01-23 RX ORDER — IBANDRONATE SODIUM 150 MG/1
TABLET, FILM COATED ORAL
Qty: 12 TABLET | Refills: 0 | Status: SHIPPED | OUTPATIENT
Start: 2019-01-23 | End: 2019-04-24

## 2019-01-23 NOTE — TELEPHONE ENCOUNTER
Routing refill request to provider for review/approval because:  Needs DEXA  Lab not at goal    Toy Sotelo, RN, BSN

## 2019-02-08 ENCOUNTER — OFFICE VISIT (OUTPATIENT)
Dept: INTERNAL MEDICINE | Facility: CLINIC | Age: 69
End: 2019-02-08
Payer: MEDICARE

## 2019-02-08 VITALS
DIASTOLIC BLOOD PRESSURE: 64 MMHG | SYSTOLIC BLOOD PRESSURE: 130 MMHG | HEART RATE: 75 BPM | RESPIRATION RATE: 18 BRPM | TEMPERATURE: 98.8 F | OXYGEN SATURATION: 99 % | HEIGHT: 64 IN | WEIGHT: 141.6 LBS | BODY MASS INDEX: 24.17 KG/M2

## 2019-02-08 DIAGNOSIS — E78.5 HYPERLIPIDEMIA LDL GOAL <130: ICD-10-CM

## 2019-02-08 DIAGNOSIS — Z00.00 ROUTINE GENERAL MEDICAL EXAMINATION AT A HEALTH CARE FACILITY: Primary | ICD-10-CM

## 2019-02-08 DIAGNOSIS — Z12.31 VISIT FOR SCREENING MAMMOGRAM: ICD-10-CM

## 2019-02-08 PROCEDURE — G0439 PPPS, SUBSEQ VISIT: HCPCS | Performed by: INTERNAL MEDICINE

## 2019-02-08 PROCEDURE — 99213 OFFICE O/P EST LOW 20 MIN: CPT | Mod: 25 | Performed by: INTERNAL MEDICINE

## 2019-02-08 RX ORDER — SIMVASTATIN 40 MG
40 TABLET ORAL AT BEDTIME
Qty: 90 TABLET | Refills: 3 | Status: SHIPPED | OUTPATIENT
Start: 2019-02-08 | End: 2020-02-12

## 2019-02-08 ASSESSMENT — MIFFLIN-ST. JEOR: SCORE: 1154.35

## 2019-02-08 NOTE — NURSING NOTE
"/80 (BP Location: Right arm, Patient Position: Sitting, Cuff Size: Adult Regular)   Pulse 75   Temp 98.8  F (37.1  C) (Oral)   Resp 18   Ht 1.613 m (5' 3.5\")   Wt 64.2 kg (141 lb 9.6 oz)   LMP  (LMP Unknown)   SpO2 99%   Breastfeeding? No   BMI 24.69 kg/m    Mammogram discussed, she already has this scheduled for 3/6/19.   Bonnie Gaxiola CMA    "

## 2019-02-08 NOTE — PROGRESS NOTES
Dr Theodore's note          ASSESSMENT & PLAN:                                                      (Z00.00) Routine general medical examination at a health care facility  (primary encounter diagnosis)  Comment:   Plan:     (E78.5) Hyperlipidemia LDL goal <130  Comment: Controlled    Plan: simvastatin (ZOCOR) 40 MG tablet            (Z12.31) Visit for screening mammogram  Comment:   Plan: mamm        Chief Complaint:                                                        Annual exam  Follow up chronic medical problems      SUBJECTIVE:                                                    History of present illness     No acute c/o     ROS:   General: Negative for fever, chills, major weight changes, fatigue  Skin: Negative for rashes, abnormal spots  Eyes: Negative for blurred or double vision  ENT/mouth: Negative for sinuses discomfort, earache, sore throat  Respiratory: Negative for cough, wheezes, chronic lung disease  Cardiovascular: Negative for rest or exertional chest pain, shortness of breath, palpitations, leg edema,   Gastrointestinal: Negative for vomiting, abdominal pain, heartburn, blood in stool, diarrhea, constipation  Genitourinary: Negative for urinary frequency, blood in urine, history of kidney stones  Female: Negative for abnormal vaginal bleeding, vaginal discharge  Neuro: Negative for headaches, numbness, tingling, weakness in arms or legs, history of seizure, recent syncope  Psychiatry: Negative for depression, anxiety, suicidal thoughts  Endo: Negative for known thyroid disease, diabetes.  Hemato/Lymph: Negative for nodes, easy bleeding, history of DVT, blood transfusion  Musculoskeletal: Negative for joint swelling, pos for on/off back pain      PMHx: - reviewed  Past Medical History:   Diagnosis Date     Osteoporosis        PSHx: reviewed  Past Surgical History:   Procedure Laterality Date     BLADDER SURGERY  2009    sling      C APPENDECTOMY       COLONOSCOPY  2005     COLONOSCOPY  9/17/2015     Dr. Mccarthy [facility]     COLONOSCOPY N/A 2015    Procedure: COLONOSCOPY;  Surgeon: Ted Mccarthy MD;  Location:  GI     GYN SURGERY       HYSTERECTOMY, PAP NO LONGER INDICATED      ovaries are present     KNEE SURGERY       ORTHOPEDIC SURGERY          Soc Hx: No daily alcohol, no smoking  Social History     Socioeconomic History     Marital status:      Spouse name: Not on file     Number of children: 2     Years of education: Not on file     Highest education level: Not on file   Social Needs     Financial resource strain: Not on file     Food insecurity - worry: Not on file     Food insecurity - inability: Not on file     Transportation needs - medical: Not on file     Transportation needs - non-medical: Not on file   Occupational History     Occupation: Teacher     Employer: [employer]   Tobacco Use     Smoking status: Never Smoker     Smokeless tobacco: Never Used   Substance and Sexual Activity     Alcohol use: Yes     Comment: occasional     Drug use: No     Sexual activity: Yes     Partners: Male   Other Topics Concern     Parent/sibling w/ CABG, MI or angioplasty before 65F 55M? Not Asked   Social History Narrative    From [place] been in [place] years.        Fam Hx: reviewed  Family History   Problem Relation Age of Onset     Alcohol/Drug Father          69yo drinker and smoker     Diabetes Mother          79yo and cataracts     Family History Negative Sister      Hypertension Sister      Cancer Brother          44yo Leukemia      No Known Problems Maternal Grandmother      No Known Problems Maternal Grandfather      No Known Problems Paternal Grandmother      No Known Problems Paternal Grandfather          Screening: reviewed      All: reviewed    Meds: reviewed  Current Outpatient Medications   Medication Sig Dispense Refill     IBANdronate (BONIVA) 150 MG tablet TAKE 1 TABLET EVERY 30 DAYS 12 tablet 0     MULTIVITAMINS OR TABS 1 tablet  "daily       omega 3 1000 MG CAPS Take 1 g by mouth daily 90 capsule      simvastatin (ZOCOR) 40 MG tablet Take 1 tablet (40 mg) by mouth At Bedtime 90 tablet 3     VITAMIN C 500 MG OR TABS 1 TABLET TWICE DAILY         OBJECTIVE:                                                    Physical Exam :  Blood pressure 130/64, pulse 75, temperature 98.8  F (37.1  C), temperature source Oral, resp. rate 18, height 1.613 m (5' 3.5\"), weight 64.2 kg (141 lb 9.6 oz), SpO2 99 %, not currently breastfeeding.     NAD, appears comfortable  Skin clear, no rashes  HEENT: PERRLA, EOMI, anicteric sclera, pink conjunctiva, external ears appear normal, bilateral tympanic membranes clinically normal, oropharynx normal color.  Neck: supple, no JVD,  no thyroidmegaly  Lymph nodes non palpable in the cervical, supraclavicular axillaries, inguinal areas  Chest: clear to auscultation with good respiratory effort  Cardiac: S1S2, RRR, no mgr appreciated  Abdomen: soft, not tender, not distended, audible bowel sound, no hepatosplenomegaly, no palpable masses, no abdominal bruits  Extremities: no cyanosis, clubbing or edema.   Neuro: A, Ox3, no focal signs.  Breast exam in supine and erect position: they are symmetrical, no skin changes, no tenderness or nodes on palpation. Nipples are erect, no skin lesions, no discharge on pressure.    Pelvic exam: deferred, s/p menopause, no symptoms, no hx of abnormal pap         Gabriela Theodore MD  Internal Medicine       SUBJECTIVE:   Terri Ratliff is a 68 year old female who presents for Preventive Visit.      Are you in the first 12 months of your Medicare Part B coverage?  No    Physical Health:    In general, how would you rate your overall physical health? excellent    Outside of work, how many days during the week do you exercise? 4-5 days/week    Outside of work, approximately how many minutes a day do you exercise?45-60 minutes  If you drink alcohol do you typically have >3 drinks per day or >7 " "drinks per week? Yes - AUDIT SCORE:       No flowsheet data found.    Do you usually eat at least 4 servings of fruit and vegetables a day, include whole grains & fiber and avoid regularly eating high fat or \"junk\" foods? Yes    Do you have any problems taking medications regularly?  No    Do you have any side effects from medications? none    Needs assistance for the following daily activities: no assistance needed    Which of the following safety concerns are present in your home?  none identified     Hearing impairment: No    In the past 6 months, have you been bothered by leaking of urine? no    Mental Health:    In general, how would you rate your overall mental or emotional health? good  PHQ-2 Score:      Do you feel safe in your environment? Yes    Do you have a Health Care Directive? No: Advance care planning was reviewed with patient; patient declined at this time.    Additional concerns to address?  No    Fall risk:  Fallen 2 or more times in the past year?: No  Any fall with injury in the past year?: No    Cognitive Screenin) Repeat 3 items (Leader, Season, Table)    2) Clock draw: NORMAL  3) 3 item recall: Recalls 1 object   Results: NORMAL clock, 1-2 items recalled: COGNITIVE IMPAIRMENT LESS LIKELY    Mini-CogTM Copyright S Gray. Licensed by the author for use in [facility]; reprinted with permission (soob@.[facility]). All rights reserved.      Do you have sleep apnea, excessive snoring or daytime drowsiness?: no            Reviewed and updated as needed this visit by clinical staff  Tobacco  Allergies  Meds  Med Hx  Surg Hx  Fam Hx  Soc Hx        Reviewed and updated as needed this visit by Provider        Social History     Tobacco Use     Smoking status: Never Smoker     Smokeless tobacco: Never Used   Substance Use Topics     Alcohol use: Yes     Comment: occasional                           Current providers sharing in care for this patient include:   Patient Care " "Team:  Gabriela Acevedo MD as PCP - General (Internal Medicine)  Gabriela Acevedo MD as PCP - Assigned PCP    The following health maintenance items are reviewed in Epic and correct as of today:  Health Maintenance   Topic Date Due     HEPATITIS C SCREENING  06[birth year]01/1968     ZOSTER IMMUNIZATION (1 of 2) 06/01/2001     PNEUMOCOCCAL IMMUNIZATION 65+ LOW/MEDIUM RISK (2 of 2 - PPSV23) 11/09/2017     PHQ-2 Q1 YR  01/15/2019     MAMMO Q1 YR  01/10/2019     FALL RISK ASSESSMENT  01/15/2019     ADVANCE DIRECTIVE PLANNING Q5 YRS  10/04/2021     DTAP/TDAP/TD IMMUNIZATION (2 - Td) 10/09/2022     LIPID SCREEN Q5 YR FEMALE (SYSTEM ASSIGNED)  01/15/2023     COLONOSCOPY Q10 YR  09/17/2025     DEXA SCAN SCREENING (SYSTEM ASSIGNED)  Completed     INFLUENZA VACCINE  Completed     IPV IMMUNIZATION  Aged Out     MENINGITIS IMMUNIZATION  Aged Out               End of Life Planning:  Patient currently has an advanced directive: Yes.  Practitioner is supportive of decision.    COUNSELING:  Reviewed preventive health counseling, as reflected in patient instructions       Regular exercise       Healthy diet/nutrition    BP Readings from Last 1 Encounters:   02/08/19 130/64     Estimated body mass index is 24.69 kg/m  as calculated from the following:    Height as of this encounter: 1.613 m (5' 3.5\").    Weight as of this encounter: 64.2 kg (141 lb 9.6 oz).           reports that  has never smoked. she has never used smokeless tobacco.      Appropriate preventive services were discussed with this patient, including applicable screening as appropriate for cardiovascular disease, diabetes, osteopenia/osteoporosis, and glaucoma.  As appropriate for age/gender, discussed screening for colorectal cancer, prostate cancer, breast cancer, and cervical cancer. Checklist reviewing preventive services available has been given to the patient.    Reviewed patients plan of care and provided an AVS. The Basic Care Plan (routine " screening as documented in Health Maintenance) for Terri meets the Care Plan requirement. This Care Plan has been established and reviewed with the Patient.    Counseling Resources:  ATP IV Guidelines  Pooled Cohorts Equation Calculator  Breast Cancer Risk Calculator  FRAX Risk Assessment  ICSI Preventive Guidelines  Dietary Guidelines for Americans, 2010  USDA's MyPlate  ASA Prophylaxis  Lung CA Screening    Gabriela Acevedo MD  [facility]

## 2019-03-06 ENCOUNTER — HOSPITAL ENCOUNTER (OUTPATIENT)
Dept: MAMMOGRAPHY | Facility: CLINIC | Age: 69
Discharge: HOME | End: 2019-03-06
Attending: INTERNAL MEDICINE | Admitting: INTERNAL MEDICINE
Payer: MEDICARE

## 2019-03-06 DIAGNOSIS — Z12.31 VISIT FOR SCREENING MAMMOGRAM: ICD-10-CM

## 2019-03-06 PROCEDURE — 77063 BREAST TOMOSYNTHESIS BI: CPT

## 2019-03-12 ENCOUNTER — HOSPITAL ENCOUNTER (OUTPATIENT)
Dept: ULTRASOUND IMAGING | Facility: CLINIC | Age: 69
Discharge: HOME | End: 2019-03-12
Attending: INTERNAL MEDICINE | Admitting: INTERNAL MEDICINE
Payer: MEDICARE

## 2019-03-12 DIAGNOSIS — R92.8 ABNORMAL MAMMOGRAM: ICD-10-CM

## 2019-03-12 PROCEDURE — 76642 ULTRASOUND BREAST LIMITED: CPT | Mod: LT

## 2019-04-24 DIAGNOSIS — M81.0 OSTEOPOROSIS, UNSPECIFIED OSTEOPOROSIS TYPE, UNSPECIFIED PATHOLOGICAL FRACTURE PRESENCE: ICD-10-CM

## 2019-04-24 NOTE — TELEPHONE ENCOUNTER
"Requested Prescriptions   Pending Prescriptions Disp Refills     IBANdronate (BONIVA) 150 MG tablet [Pharmacy Med Name: BONIVA TABS 3'S 150MG] 3 tablet 0     Sig: TAKE 1 TABLET EVERY 30 DAYS   Last Written Prescription Date:  01/23/2019  Last Fill Quantity: 12,  # refills: 0   Last office visit: 2/8/2019 with prescribing provider:     Future Office Visit:      Bisphosphonates Failed - 4/24/2019  6:42 AM        Failed - Dexa on file within past 2 years     Please review last Dexa result.           Failed - Normal serum creatinine on file within past 12 months     Recent Labs   Lab Test 01/15/18  0854   CR 0.78             Passed - Recent (12 mo) or future (30 days) visit within the authorizing provider's specialty     Patient had office visit in the last 12 months or has a visit in the next 30 days with authorizing provider or within the authorizing provider's specialty.  See \"Patient Info\" tab in inbasket, or \"Choose Columns\" in Meds & Orders section of the refill encounter.              Passed - Medication is active on med list        Passed - Patient is age 18 or older        "

## 2019-04-25 RX ORDER — IBANDRONATE SODIUM 150 MG/1
TABLET, FILM COATED ORAL
Qty: 3 TABLET | Refills: 3 | Status: SHIPPED | OUTPATIENT
Start: 2019-04-25 | End: 2020-08-05

## 2019-04-25 NOTE — TELEPHONE ENCOUNTER
"Routing refill request to provider for review/approval because:  Labs & No Dexa on file in last 2 years      Last Dexa 11/10/2016  \"Follow up can be considered in 2 years\"  "

## 2020-02-11 DIAGNOSIS — E78.5 HYPERLIPIDEMIA LDL GOAL <130: ICD-10-CM

## 2020-02-12 RX ORDER — SIMVASTATIN 40 MG
TABLET ORAL
Qty: 30 TABLET | Refills: 0 | Status: SHIPPED | OUTPATIENT
Start: 2020-02-12 | End: 2020-03-16

## 2020-02-12 NOTE — TELEPHONE ENCOUNTER
"Requested Prescriptions   Pending Prescriptions Disp Refills     simvastatin (ZOCOR) 40 MG tablet [Pharmacy Med Name: SIMVASTATIN TABS 40MG] 90 tablet 4     Sig: TAKE 1 TABLET AT BEDTIME   Last Written Prescription Date:  02/08/2019  Last Fill Quantity: 90,  # refills: 03   Last office visit: 2/8/2019 with prescribing provider:     Future Office Visit:      Statins Protocol Failed - 2/11/2020  6:08 PM        Failed - LDL on file in past 12 months     Recent Labs   Lab Test 01/15/18  0854   *             Failed - Recent (12 mo) or future (30 days) visit within the authorizing provider's specialty     Patient has had an office visit with the authorizing provider or a provider within the authorizing providers department within the previous 12 mos or has a future within next 30 days. See \"Patient Info\" tab in inbasket, or \"Choose Columns\" in Meds & Orders section of the refill encounter.              Passed - No abnormal creatine kinase in past 12 months     No lab results found.             Passed - Medication is active on med list        Passed - Patient is age 18 or older        Passed - No active pregnancy on record        Passed - No positive pregnancy test in past 12 months        "

## 2020-03-16 DIAGNOSIS — E78.5 HYPERLIPIDEMIA LDL GOAL <130: ICD-10-CM

## 2020-03-16 NOTE — TELEPHONE ENCOUNTER
"Patient is due to be seen but afraid to come into clinic with the COVID-19.  Patient would like a 90 day supply.    Requested Prescriptions   Pending Prescriptions Disp Refills     simvastatin (ZOCOR) 40 MG tablet  Last Written Prescription Date:  02/12/20  Last Fill Quantity: 30,  # refills: 0   Last office visit: 2/8/2019 with prescribing provider:  02/08/19   Future Office Visit:     30 tablet 0     Sig: Take 1 tablet (40 mg) by mouth At Bedtime       Statins Protocol Failed - 3/16/2020 10:20 AM        Failed - LDL on file in past 12 months     Recent Labs   Lab Test 01/15/18  0854   *             Failed - Recent (12 mo) or future (30 days) visit within the authorizing provider's specialty     Patient has had an office visit with the authorizing provider or a provider within the authorizing providers department within the previous 12 mos or has a future within next 30 days. See \"Patient Info\" tab in inbasket, or \"Choose Columns\" in Meds & Orders section of the refill encounter.              Passed - No abnormal creatine kinase in past 12 months     No lab results found.             Passed - Medication is active on med list        Passed - Patient is age 18 or older        Passed - No active pregnancy on record        Passed - No positive pregnancy test in past 12 months             "

## 2020-03-18 DIAGNOSIS — E78.5 HYPERLIPIDEMIA LDL GOAL <130: ICD-10-CM

## 2020-03-18 NOTE — TELEPHONE ENCOUNTER
Routing refill request to provider for review/approval because:  Patient needs to be seen, but not wanting to come to clinic at present time.  Please advise if patient can do phone visit.  Patient doesn't have my chart.       Last office visit was 2/8/19

## 2020-03-18 NOTE — TELEPHONE ENCOUNTER
"Requested Prescriptions   Pending Prescriptions Disp Refills     simvastatin (ZOCOR) 40 MG tablet [Pharmacy Med Name: SIMVASTATIN TABS 40MG] 30 tablet 11     Sig: TAKE 1 TABLET AT BEDTIME (APPOINTMENT DUE, NO REFILL OR LARGER QUANTITY UNTIL SEEN)   Last Written Prescription Date:  02/12/2020  Last Fill Quantity: 30,  # refills: 0   Last office visit: 2/8/2019 with prescribing provider:     Future Office Visit:      Statins Protocol Failed - 3/18/2020  9:44 AM        Failed - LDL on file in past 12 months     Recent Labs   Lab Test 01/15/18  0854   *             Failed - Recent (12 mo) or future (30 days) visit within the authorizing provider's specialty     Patient has had an office visit with the authorizing provider or a provider within the authorizing providers department within the previous 12 mos or has a future within next 30 days. See \"Patient Info\" tab in inbasket, or \"Choose Columns\" in Meds & Orders section of the refill encounter.              Passed - No abnormal creatine kinase in past 12 months     No lab results found.             Passed - Medication is active on med list        Passed - Patient is age 18 or older        Passed - No active pregnancy on record        Passed - No positive pregnancy test in past 12 months           "

## 2020-03-19 RX ORDER — SIMVASTATIN 40 MG
40 TABLET ORAL AT BEDTIME
Qty: 90 TABLET | Refills: 1 | Status: SHIPPED | OUTPATIENT
Start: 2020-03-19 | End: 2020-08-07

## 2020-03-20 RX ORDER — SIMVASTATIN 40 MG
TABLET ORAL
Qty: 30 TABLET | Refills: 11 | OUTPATIENT
Start: 2020-03-20

## 2020-08-03 ENCOUNTER — HOSPITAL ENCOUNTER (EMERGENCY)
Facility: CLINIC | Age: 70
Discharge: HOME | End: 2020-08-03
Attending: EMERGENCY MEDICINE | Admitting: EMERGENCY MEDICINE
Payer: MEDICARE

## 2020-08-03 ENCOUNTER — APPOINTMENT (OUTPATIENT)
Dept: GENERAL RADIOLOGY | Facility: CLINIC | Age: 70
End: 2020-08-03
Attending: EMERGENCY MEDICINE
Payer: MEDICARE

## 2020-08-03 VITALS
DIASTOLIC BLOOD PRESSURE: 93 MMHG | SYSTOLIC BLOOD PRESSURE: 156 MMHG | OXYGEN SATURATION: 100 % | RESPIRATION RATE: 16 BRPM | HEART RATE: 72 BPM | TEMPERATURE: 97.6 F

## 2020-08-03 DIAGNOSIS — S61.207A OPEN WOUND OF LEFT LITTLE FINGER WITHOUT DAMAGE TO NAIL, INITIAL ENCOUNTER: ICD-10-CM

## 2020-08-03 DIAGNOSIS — S60.052A CONTUSION OF LEFT LITTLE FINGER WITHOUT DAMAGE TO NAIL, INITIAL ENCOUNTER: ICD-10-CM

## 2020-08-03 DIAGNOSIS — M81.0 OSTEOPOROSIS, UNSPECIFIED OSTEOPOROSIS TYPE, UNSPECIFIED PATHOLOGICAL FRACTURE PRESENCE: ICD-10-CM

## 2020-08-03 PROCEDURE — 73130 X-RAY EXAM OF HAND: CPT | Mod: LT

## 2020-08-03 PROCEDURE — 99283 EMERGENCY DEPT VISIT LOW MDM: CPT

## 2020-08-03 RX ORDER — LIDOCAINE HYDROCHLORIDE 20 MG/ML
INJECTION, SOLUTION INFILTRATION; PERINEURAL
Status: DISCONTINUED
Start: 2020-08-03 | End: 2020-08-03 | Stop reason: HOSPADM

## 2020-08-03 ASSESSMENT — ENCOUNTER SYMPTOMS: WOUND: 1

## 2020-08-03 NOTE — ED PROVIDER NOTES
History   Chief Complaint  Wound Check    HPI   Terri Ratliff is a 70 year old female with a history of osteopetrosis who presents for evaluation of a wound on her left pinky. The patient reports that she was walking her dog yesterday at 1100 when she tripped and fell. She braced the fall with her left hand, obtaining the wound on her pinky and small abrasion on her left knee. She denies hitting her head or elbow. Of note, her last Tdap was in 2012.    Allergies  No Known Drug Allergies    Medications  Boniva  Zocor     Past Medical History  Hyperlipidemia  Osteopetrosis     Past Surgical History  Bladder surgery  Appendectomy  Hysterectomy  Knee surgery      Family History  Father: polysubstance abuse  Mother: diabetes  Sister: hypertension  Brother: Leukemia     Social History  Tobacco use: no  Alcohol use: occasional  Drug use: no  Marital Status:      Review of Systems   Skin: Positive for wound (left pinky ).   All other systems reviewed and are negative.    Physical Exam     Patient Vitals for the past 24 hrs:   BP Temp Temp src Pulse Heart Rate Resp SpO2   08/03/20 1126 (!) 156/93 97.6  F (36.4  C) Oral 72 72 16 100 %     Physical Exam    Gen: Resting comfortably   CV: ppi, regular   Resp: speaking in full sentences without any resp distress  Ext:  L small finger: ulnar side of the phalanx there is a superficial skin avulsion over middle phalanx, circular 4iae2pt.  Multiple small epidermal remnants present, no FB after irrigation.  Fds/fdp/ext intact.  Distal SILT, distal perfusion intact.  Superficial abrasion ulnar side of mcp, dip, and along the 4th figner ulnar side.   Skin: warm dry well perfused  Neuro: Alert, no gross motor or sensory deficits,  gait stable          Emergency Department Course     Imaging:  Radiology findings were communicated with the patient who voiced understanding of the findings.    XR Hand 3 views, left:   No acute fracture identified. As per radiology.     Emergency  Department Course:  Past medical records, nursing notes, and vitals reviewed.    1130 I physically examined the patient as documented above.     The patient was sent for radiographs while in the emergency department, results above.     1223 I rechecked the patient and discussed the findings of their workup thus far.     Findings and plan explained to the Patient. Patient discharged home with instructions regarding supportive care, medications, and reasons to return. The importance of close follow-up was reviewed.    I personally reviewed imaging results with the Patient and answered all related questions prior to discharge.     Impression & Plan   Medical Decision Makin y F with skin avulsiularon, superficial abrasion and hand contusion.  XR negative.  Wound irrigated and examined through ROM and no FB.  No neurovascular compromise.  Discussed healing by secondary intention, signs of infection, etc.  Pt and  comfortable and agreeable with that plan.  Low risk infection, and I think abx can be deferred, pt comfortable with that.      Diagnosis:    ICD-10-CM    1. Open wound of left little finger without damage to nail, initial encounter  S61.207A    2. Contusion of left little finger without damage to nail, initial encounter  S60.052A        Disposition:  Discharged to home.    Scribe Disclosure:  I, Chris Laura, am serving as a scribe at 11:29 AM on 8/3/2020 to document services personally performed by Drake Smith, * based on my observations and the provider's statements to me.      Drake Smith MD  20 [id number]

## 2020-08-03 NOTE — ED TRIAGE NOTES
Pt with left pinky wound after falling yesterday while walking dog. No uncontrolled bleeding. ABCs intact, alert and oriented x3.

## 2020-08-03 NOTE — LETTER
[facility]  [address]  [facility] [address]  Phone: [phone]        August 6, 2020      Terri Ratliff                                                                                                                                [address]  [place] [address]            Dear Ms. Ratliff,    We are concerned about your health care.  We recently provided you with a medication refill.  Many medications require routine follow-up with your Doctor.      At this time we ask that: You schedule an appointment for your annual physical.    Your prescription: Has been refilled once so you may have time for the above noted follow-up.      Thank you,      [facility]

## 2020-08-03 NOTE — ED AVS SNAPSHOT
[facility] Emergency Department  [address]  [facility] [address]  Phone:  [phone]  Fax:  [phone]                                    Terri Ratliff   MRN: [id number]    Department:  [facility] Emergency Department   Date of Visit:  8/3/2020           After Visit Summary Signature Page    I have received my discharge instructions, and my questions have been answered. I have discussed any challenges I see with this plan with the nurse or doctor.    ..........................................................................................................................................  Patient/Patient Representative Signature      ..........................................................................................................................................  Patient Representative Print Name and Relationship to Patient    ..................................................               ................................................  Date                                   Time    ..........................................................................................................................................  Reviewed by Signature/Title    ...................................................              ..............................................  Date                                               Time          22EPIC Rev 08/18

## 2020-08-04 NOTE — TELEPHONE ENCOUNTER
Pending Prescriptions:                       Disp   Refills    IBANdronate (BONIVA) 150 MG tablet [Pharma*3 tabl*3        Sig: TAKE 1 TABLET EVERY 30 DAYS

## 2020-08-05 RX ORDER — IBANDRONATE SODIUM 150 MG/1
TABLET, FILM COATED ORAL
Qty: 3 TABLET | Refills: 0 | Status: SHIPPED | OUTPATIENT
Start: 2020-08-05 | End: 2020-10-29

## 2020-08-05 NOTE — TELEPHONE ENCOUNTER
Please help the patient to schedule video visit or office visit for annual wellness and follow-up blood pressure

## 2020-08-06 DIAGNOSIS — E78.5 HYPERLIPIDEMIA LDL GOAL <130: ICD-10-CM

## 2020-08-07 RX ORDER — SIMVASTATIN 40 MG
TABLET ORAL
Qty: 90 TABLET | Refills: 0 | Status: SHIPPED | OUTPATIENT
Start: 2020-08-07 | End: 2020-11-05

## 2020-08-07 NOTE — TELEPHONE ENCOUNTER
Pending Prescriptions:                       Disp   Refills    simvastatin (ZOCOR) 40 MG tablet [Pharmacy*90 tab*3        Sig: TAKE 1 TABLET AT BEDTIME

## 2020-10-27 DIAGNOSIS — M81.0 OSTEOPOROSIS, UNSPECIFIED OSTEOPOROSIS TYPE, UNSPECIFIED PATHOLOGICAL FRACTURE PRESENCE: ICD-10-CM

## 2020-10-29 RX ORDER — IBANDRONATE SODIUM 150 MG/1
TABLET, FILM COATED ORAL
Qty: 3 TABLET | Refills: 0 | Status: SHIPPED | OUTPATIENT
Start: 2020-10-29 | End: 2020-11-05

## 2020-10-29 NOTE — TELEPHONE ENCOUNTER
Medication is being filled for 1 time refill only due to:  Future appointment scheduled     Next 5 appointments (look out 90 days)    Nov 05, 2020  1:20 PM  PHYSICAL with Gabriela Acevedo MD  [facility] ([facility]) [address]  [facility] [id number] [phone]

## 2020-11-05 ENCOUNTER — OFFICE VISIT (OUTPATIENT)
Dept: INTERNAL MEDICINE | Facility: CLINIC | Age: 70
End: 2020-11-05
Payer: MEDICARE

## 2020-11-05 VITALS
SYSTOLIC BLOOD PRESSURE: 155 MMHG | RESPIRATION RATE: 16 BRPM | HEIGHT: 64 IN | DIASTOLIC BLOOD PRESSURE: 81 MMHG | WEIGHT: 146 LBS | HEART RATE: 73 BPM | OXYGEN SATURATION: 99 % | BODY MASS INDEX: 24.92 KG/M2

## 2020-11-05 DIAGNOSIS — R03.0 ELEVATED BLOOD PRESSURE READING WITHOUT DIAGNOSIS OF HYPERTENSION: ICD-10-CM

## 2020-11-05 DIAGNOSIS — E78.5 HYPERLIPIDEMIA LDL GOAL <130: ICD-10-CM

## 2020-11-05 DIAGNOSIS — Z12.31 ENCOUNTER FOR SCREENING MAMMOGRAM FOR BREAST CANCER: ICD-10-CM

## 2020-11-05 DIAGNOSIS — Z78.0 ASYMPTOMATIC POSTMENOPAUSAL STATUS: ICD-10-CM

## 2020-11-05 DIAGNOSIS — H61.23 BILATERAL IMPACTED CERUMEN: ICD-10-CM

## 2020-11-05 DIAGNOSIS — M81.0 OSTEOPOROSIS, UNSPECIFIED OSTEOPOROSIS TYPE, UNSPECIFIED PATHOLOGICAL FRACTURE PRESENCE: ICD-10-CM

## 2020-11-05 DIAGNOSIS — Z00.00 ROUTINE GENERAL MEDICAL EXAMINATION AT A HEALTH CARE FACILITY: Primary | ICD-10-CM

## 2020-11-05 DIAGNOSIS — Z11.59 ENCOUNTER FOR HEPATITIS C SCREENING TEST FOR LOW RISK PATIENT: ICD-10-CM

## 2020-11-05 PROCEDURE — G0439 PPPS, SUBSEQ VISIT: HCPCS | Performed by: INTERNAL MEDICINE

## 2020-11-05 PROCEDURE — 99213 OFFICE O/P EST LOW 20 MIN: CPT | Mod: 25 | Performed by: INTERNAL MEDICINE

## 2020-11-05 PROCEDURE — 69210 REMOVE IMPACTED EAR WAX UNI: CPT | Performed by: INTERNAL MEDICINE

## 2020-11-05 RX ORDER — SIMVASTATIN 40 MG
TABLET ORAL
Qty: 90 TABLET | Refills: 3 | OUTPATIENT
Start: 2020-11-05

## 2020-11-05 RX ORDER — IBANDRONATE SODIUM 150 MG/1
TABLET, FILM COATED ORAL
Qty: 3 TABLET | Refills: 4 | Status: SHIPPED | OUTPATIENT
Start: 2020-11-05 | End: 2022-01-04

## 2020-11-05 RX ORDER — SIMVASTATIN 40 MG
40 TABLET ORAL AT BEDTIME
Qty: 90 TABLET | Refills: 1 | Status: SHIPPED | OUTPATIENT
Start: 2020-11-05 | End: 2021-06-10

## 2020-11-05 ASSESSMENT — ENCOUNTER SYMPTOMS
DIARRHEA: 0
ABDOMINAL PAIN: 0
ARTHRALGIAS: 0
FREQUENCY: 0
MYALGIAS: 0
COUGH: 1
EYE PAIN: 0
PALPITATIONS: 0
SORE THROAT: 0
NAUSEA: 0
DYSURIA: 0
CONSTIPATION: 0
HEARTBURN: 0
HEMATOCHEZIA: 0
NERVOUS/ANXIOUS: 0
HEMATURIA: 0
WEAKNESS: 0
JOINT SWELLING: 0
HEADACHES: 0
DIZZINESS: 0
BREAST MASS: 0
FEVER: 0
CHILLS: 0
SHORTNESS OF BREATH: 0
PARESTHESIAS: 0

## 2020-11-05 ASSESSMENT — PATIENT HEALTH QUESTIONNAIRE - PHQ9
SUM OF ALL RESPONSES TO PHQ QUESTIONS 1-9: 0
SUM OF ALL RESPONSES TO PHQ QUESTIONS 1-9: 0
10. IF YOU CHECKED OFF ANY PROBLEMS, HOW DIFFICULT HAVE THESE PROBLEMS MADE IT FOR YOU TO DO YOUR WORK, TAKE CARE OF THINGS AT HOME, OR GET ALONG WITH OTHER PEOPLE: NOT DIFFICULT AT ALL

## 2020-11-05 ASSESSMENT — ACTIVITIES OF DAILY LIVING (ADL): CURRENT_FUNCTION: NO ASSISTANCE NEEDED

## 2020-11-05 ASSESSMENT — MIFFLIN-ST. JEOR: SCORE: 1164.31

## 2020-11-05 NOTE — PROGRESS NOTES
Dr Theodore's note    Patient's instructions / PLAN:                                                        Plan:  1. Please make a lab appointment for fasting labs    2. Mammogram ( please call [phone] to schedule it)   3. Bone density scan [phone]  4. Continue same meds, same doses for now   5. Low salt diet will help with the blood pressure  6. Make an appointment in January for the blood pressure. Bring the results from home and the machine  7. Before the appointment use ear drop over the counter on the left ear        ASSESSMENT & PLAN:                                                      (Z00.00) Routine general medical examination at a health care facility  (primary encounter diagnosis)  Comment:   Plan: CBC with platelets, Comprehensive metabolic         panel, Lipid panel reflex to direct LDL         Fasting, Albumin Random Urine Quantitative with        Creat Ratio, TSH with free T4 reflex            (E78.5) Hyperlipidemia LDL goal <130  Comment:   Plan: Comprehensive metabolic panel, Lipid panel         reflex to direct LDL Fasting, TSH with free T4         reflex, simvastatin (ZOCOR) 40 MG tablet            (M81.0) Osteoporosis, unspecified osteoporosis type, unspecified pathological fracture presence  Comment:   Plan: DX Hip/Pelvis/Spine, IBANdronate (BONIVA) 150         MG tablet            (R03.0) Elevated blood pressure reading without diagnosis of hypertension  Comment:   Plan: as above     (Z78.0) Asymptomatic postmenopausal status  Comment:   Plan: DX Hip/Pelvis/Spine            (Z11.59) Encounter for hepatitis C screening test for low risk patient  Comment:   Plan: Hepatitis C Screen Reflex to HCV RNA Quant and         Genotype            (Z12.31) Encounter for screening mammogram for breast cancer  Comment:   Plan: MA Screening Digital Bilateral            (H61.23) Bilateral impacted cerumen  Comment:   Plan:  See below         Chief Complaint:                                                         Annual exam  Follow up chronic medical problems      SUBJECTIVE:                                                    History of present illness     We reviewed the chronic medical problems as above.   I reviewed the recent tests results in Epic       The auditory external canals have impacted wax bilaterally  The patient gave me verbal consent and I remove the wax from the R ear only No complications     ROS:     See below      PMHx: - reviewed  Past Medical History:   Diagnosis Date     Osteoporosis        PSHx: reviewed  Past Surgical History:   Procedure Laterality Date     BLADDER SURGERY  2009    sling      C APPENDECTOMY       COLONOSCOPY  2005     COLONOSCOPY  9/17/2015    Dr. Mccarthy [facility]     COLONOSCOPY N/A 9/17/2015    Procedure: COLONOSCOPY;  Surgeon: Ted Mccarthy MD;  Location:  GI     GYN SURGERY       HYSTERECTOMY, PAP NO LONGER INDICATED      ovaries are present     KNEE SURGERY       ORTHOPEDIC SURGERY          Soc Hx: No daily alcohol, no smoking  Social History     Socioeconomic History     Marital status:      Spouse name: Not on file     Number of children: 2     Years of education: Not on file     Highest education level: Not on file   Occupational History     Occupation: Teacher     Employer: [employer]   Social Needs     Financial resource strain: Not on file     Food insecurity     Worry: Not on file     Inability: Not on file     Transportation needs     Medical: Not on file     Non-medical: Not on file   Tobacco Use     Smoking status: Never Smoker     Smokeless tobacco: Never Used   Substance and Sexual Activity     Alcohol use: Yes     Comment: occasional     Drug use: No     Sexual activity: Yes     Partners: Male   Lifestyle     Physical activity     Days per week: Not on file     Minutes per session: Not on file     Stress: Not on file   Relationships     Social connections     Talks on phone: Not on file     Gets together: Not on file      "Attends Sabianist service: Not on file     Active member of club or organization: Not on file     Attends meetings of clubs or organizations: Not on file     Relationship status: Not on file     Intimate partner violence     Fear of current or ex partner: Not on file     Emotionally abused: Not on file     Physically abused: Not on file     Forced sexual activity: Not on file   Other Topics Concern     Parent/sibling w/ CABG, MI or angioplasty before 65F 55M? Not Asked   Social History Narrative    From [place] been in [place] years.        Fam Hx: reviewed  Family History   Problem Relation Age of Onset     Alcohol/Drug Father          71yo drinker and smoker     Diabetes Mother          81yo and cataracts     Family History Negative Sister      Hypertension Sister      Cancer Brother          44yo Leukemia      No Known Problems Maternal Grandmother      No Known Problems Maternal Grandfather      No Known Problems Paternal Grandmother      No Known Problems Paternal Grandfather          Screening: reviewed      All: reviewed    Meds: reviewed  Current Outpatient Medications   Medication Sig Dispense Refill     IBANdronate (BONIVA) 150 MG tablet TAKE 1 TABLET EVERY 30 DAYS 3 tablet 0     MULTIVITAMINS OR TABS 1 tablet daily       omega 3 1000 MG CAPS Take 1 g by mouth daily 90 capsule      simvastatin (ZOCOR) 40 MG tablet TAKE 1 TABLET AT BEDTIME 90 tablet 0     VITAMIN C 500 MG OR TABS 1 TABLET TWICE DAILY         OBJECTIVE:                                                    Physical Exam :  Blood pressure (!) 155/81, pulse 73, resp. rate 16, height 1.613 m (5' 3.5\"), weight 66.2 kg (146 lb), SpO2 99 %, not currently breastfeeding.     NAD, appears comfortable  Skin clear, no rashes  HEENT: Bilateral impacted cerumen  Neck: supple, no JVD,  no thyroidmegaly  Lymph nodes non palpable in the cervical, supraclavicular axillaries,   Chest: clear to auscultation with good respiratory " "effort  Cardiac: S1S2, RRR, no mgr appreciated  Abdomen: soft, not tender, not distended, audible bowel sound, no hepatosplenomegaly, no palpable masses, no abdominal bruits  Extremities: no cyanosis, clubbing or edema.   Neuro: A, Ox3, no focal signs.  Breast exam in supine and erect position: they are symmetrical, no skin changes, no tenderness or nodes on palpation. Nipples are erect, no skin lesions, no discharge on pressure.    Pelvic exam: deferred, s/p menopause, no symptoms, no hx of abnormal pap         Gabriela Theodore MD  Internal Medicine        SUBJECTIVE:   Terri Ratliff is a 70 year old female who presents for Preventive Visit.  Answers for HPI/ROS submitted by the patient on 11/5/2020   Annual Exam:  If you checked off any problems, how difficult have these problems made it for you to do your work, take care of things at home, or get along with other people?: Not difficult at all  PHQ9 TOTAL SCORE: 0      Patient has been advised of split billing requirements and indicates understanding: Yes   Are you in the first 12 months of your Medicare coverage?  No    Healthy Habits:     In general, how would you rate your overall health?  Good    Frequency of exercise:  2-3 days/week    Duration of exercise:  45-60 minutes    Do you usually eat at least 4 servings of fruit and vegetables a day, include whole grains    & fiber and avoid regularly eating high fat or \"junk\" foods?  Yes    Taking medications regularly:  Yes    Medication side effects:  None    Ability to successfully perform activities of daily living:  No assistance needed    Home Safety:  No safety concerns identified    Hearing Impairment:  No hearing concerns    In the past 6 months, have you been bothered by leaking of urine?  No    In general, how would you rate your overall mental or emotional health?  Excellent      PHQ-2 Total Score: 0    Additional concerns today:  No    Do you feel safe in your environment? Yes    Have you ever done " Advance Care Planning? (For example, a Health Directive, POLST, or a discussion with a medical provider or your loved ones about your wishes): No, advance care planning information given to patient to review.  Advanced care planning was discussed at today's visit.      Fall risk       Fallen two or more times in the last year   No   Any fall with an injury in the last year  No    Cognitive Screening   1) Repeat 3 items (Leader, Season, Table)    2) Clock draw: NORMAL  3) 3 item recall: Recalls 3 objects  Results: 3 items recalled: COGNITIVE IMPAIRMENT LESS LIKELY    Mini-CogTM Copyright S Gray. Licensed by the author for use in [facility]; reprinted with permission (soob@[facility]). All rights reserved.      Do you have sleep apnea, excessive snoring or daytime drowsiness?: no    Reviewed and updated as needed this visit by clinical staff   Allergies  Meds              Reviewed and updated as needed this visit by Provider                Social History     Tobacco Use     Smoking status: Never Smoker     Smokeless tobacco: Never Used   Substance Use Topics     Alcohol use: Yes     Comment: occasional         Alcohol Use 11/5/2020   Prescreen: >3 drinks/day or >7 drinks/week? No   Prescreen: >3 drinks/day or >7 drinks/week? -           Hyperlipidemia Follow-Up      Are you regularly taking any medication or supplement to lower your cholesterol?   Yes- lipitor    Are you having muscle aches or other side effects that you think could be caused by your cholesterol lowering medication?  No      Current providers sharing in care for this patient include:   Patient Care Team:  Gabriela Acevedo MD as PCP - General (Internal Medicine)  Gabriela Acevedo MD as Assigned PCP    The following health maintenance items are reviewed in Epic and correct as of today:  Health Maintenance   Topic Date Due     HEPATITIS C SCREENING  06/01/1968     ZOSTER IMMUNIZATION (1 of 2) 06/01/2001      Pneumococcal Vaccine: 65+ Years (2 of 2 - PPSV23) 11/09/2017     MEDICARE ANNUAL WELLNESS VISIT  02/08/2020     FALL RISK ASSESSMENT  02/08/2020     MAMMO SCREENING  03/06/2020     ADVANCE CARE PLANNING  10/04/2021     DTAP/TDAP/TD IMMUNIZATION (2 - Td) 10/09/2022     LIPID  01/15/2023     COLORECTAL CANCER SCREENING  09/17/2025     DEXA  Completed     PHQ-2  Completed     INFLUENZA VACCINE  Completed     Pneumococcal Vaccine: Pediatrics (0 to 5 Years) and At-Risk Patients (6 to 64 Years)  Aged Out     IPV IMMUNIZATION  Aged Out     MENINGITIS IMMUNIZATION  Aged Out     HEPATITIS B IMMUNIZATION  Aged Out     Labs reviewed in EPIC      Review of Systems   Constitutional: Negative for chills and fever.   HENT: Negative for congestion, ear pain, hearing loss and sore throat.    Eyes: Negative for pain and visual disturbance.   Respiratory: Positive for cough. Negative for shortness of breath.    Cardiovascular: Negative for chest pain, palpitations and peripheral edema.   Gastrointestinal: Negative for abdominal pain, constipation, diarrhea, heartburn, hematochezia and nausea.   Breasts:  Negative for tenderness, breast mass and discharge.   Genitourinary: Negative for dysuria, frequency, genital sores, hematuria, pelvic pain, urgency, vaginal bleeding and vaginal discharge.   Musculoskeletal: Negative for arthralgias, joint swelling and myalgias.   Skin: Negative for rash.   Neurological: Negative for dizziness, weakness, headaches and paresthesias.   Psychiatric/Behavioral: Negative for mood changes. The patient is not nervous/anxious.      Patient has been advised of split billing requirements and indicates understanding: Yes At the check in, at the    COUNSELING:  Reviewed preventive health counseling, as reflected in patient instructions       Regular exercise       Healthy diet/nutrition    Estimated body mass index is 24.69 kg/m  as calculated from the following:    Height as of 2/8/19: 1.613 m (5'  "3.5\").    Weight as of 2/8/19: 64.2 kg (141 lb 9.6 oz).        She reports that she has never smoked. She has never used smokeless tobacco.      Appropriate preventive services were discussed with this patient, including applicable screening as appropriate for cardiovascular disease, diabetes, osteopenia/osteoporosis, and glaucoma.  As appropriate for age/gender, discussed screening for colorectal cancer, prostate cancer, breast cancer, and cervical cancer. Checklist reviewing preventive services available has been given to the patient.    Reviewed patients plan of care and provided an AVS. The Basic Care Plan (routine screening as documented in Health Maintenance) for Terri meets the Care Plan requirement. This Care Plan has been established and reviewed with the Patient.    Counseling Resources:  ATP IV Guidelines  Pooled Cohorts Equation Calculator  Breast Cancer Risk Calculator  Breast Cancer: Medication to Reduce Risk  FRAX Risk Assessment  ICSI Preventive Guidelines  Dietary Guidelines for Americans, 2010  USDA's MyPlate  ASA Prophylaxis  Lung CA Screening    Gabriela Acevedo MD  [facility]    Identified Health Risks:  "

## 2020-11-05 NOTE — PATIENT INSTRUCTIONS
Plan:  1. Please make a lab appointment for fasting labs    2. Mammogram ( please call [phone] to schedule it)   3. Bone density scan [phone]  4. Continue same meds, same doses for now   5. Low salt diet will help with the blood pressure  6. Make an appointment in January for the blood pressure. Bring the results from home and the machine  7. Before the appointment use ear drop over the counter on the left ear

## 2020-11-06 ASSESSMENT — PATIENT HEALTH QUESTIONNAIRE - PHQ9: SUM OF ALL RESPONSES TO PHQ QUESTIONS 1-9: 0

## 2020-11-13 DIAGNOSIS — E78.5 HYPERLIPIDEMIA LDL GOAL <130: ICD-10-CM

## 2020-11-13 DIAGNOSIS — Z11.59 ENCOUNTER FOR HEPATITIS C SCREENING TEST FOR LOW RISK PATIENT: ICD-10-CM

## 2020-11-13 DIAGNOSIS — Z00.00 ROUTINE GENERAL MEDICAL EXAMINATION AT A HEALTH CARE FACILITY: ICD-10-CM

## 2020-11-13 LAB
ALBUMIN SERPL-MCNC: 4.1 G/DL (ref 3.4–5)
ALP SERPL-CCNC: 71 U/L (ref 40–150)
ALT SERPL W P-5'-P-CCNC: 22 U/L (ref 0–50)
ANION GAP SERPL CALCULATED.3IONS-SCNC: 2 MMOL/L (ref 3–14)
AST SERPL W P-5'-P-CCNC: 19 U/L (ref 0–45)
BILIRUB SERPL-MCNC: 0.6 MG/DL (ref 0.2–1.3)
BUN SERPL-MCNC: 14 MG/DL (ref 7–30)
CALCIUM SERPL-MCNC: 9.2 MG/DL (ref 8.5–10.1)
CHLORIDE SERPL-SCNC: 109 MMOL/L (ref 94–109)
CHOLEST SERPL-MCNC: 205 MG/DL
CO2 SERPL-SCNC: 30 MMOL/L (ref 20–32)
CREAT SERPL-MCNC: 0.86 MG/DL (ref 0.52–1.04)
CREAT UR-MCNC: 151 MG/DL
ERYTHROCYTE [DISTWIDTH] IN BLOOD BY AUTOMATED COUNT: 11.7 % (ref 10–15)
GFR SERPL CREATININE-BSD FRML MDRD: 68 ML/MIN/{1.73_M2}
GLUCOSE SERPL-MCNC: 102 MG/DL (ref 70–99)
HCT VFR BLD AUTO: 42 % (ref 35–47)
HCV AB SERPL QL IA: NONREACTIVE
HDLC SERPL-MCNC: 61 MG/DL
HGB BLD-MCNC: 13.7 G/DL (ref 11.7–15.7)
LDLC SERPL CALC-MCNC: 120 MG/DL
MCH RBC QN AUTO: 29.8 PG (ref 26.5–33)
MCHC RBC AUTO-ENTMCNC: 32.6 G/DL (ref 31.5–36.5)
MCV RBC AUTO: 92 FL (ref 78–100)
MICROALBUMIN UR-MCNC: 25 MG/L
MICROALBUMIN/CREAT UR: 16.76 MG/G CR (ref 0–25)
NONHDLC SERPL-MCNC: 144 MG/DL
PLATELET # BLD AUTO: 239 10E9/L (ref 150–450)
POTASSIUM SERPL-SCNC: 4.4 MMOL/L (ref 3.4–5.3)
PROT SERPL-MCNC: 7.9 G/DL (ref 6.8–8.8)
RBC # BLD AUTO: 4.59 10E12/L (ref 3.8–5.2)
SODIUM SERPL-SCNC: 141 MMOL/L (ref 133–144)
TRIGL SERPL-MCNC: 120 MG/DL
TSH SERPL DL<=0.005 MIU/L-ACNC: 2.38 MU/L (ref 0.4–4)
WBC # BLD AUTO: 4.1 10E9/L (ref 4–11)

## 2020-11-13 PROCEDURE — 82043 UR ALBUMIN QUANTITATIVE: CPT | Performed by: INTERNAL MEDICINE

## 2020-11-13 PROCEDURE — 86803 HEPATITIS C AB TEST: CPT | Performed by: INTERNAL MEDICINE

## 2020-11-13 PROCEDURE — 80061 LIPID PANEL: CPT | Performed by: INTERNAL MEDICINE

## 2020-11-13 PROCEDURE — 84443 ASSAY THYROID STIM HORMONE: CPT | Performed by: INTERNAL MEDICINE

## 2020-11-13 PROCEDURE — 36415 COLL VENOUS BLD VENIPUNCTURE: CPT | Performed by: INTERNAL MEDICINE

## 2020-11-13 PROCEDURE — 85027 COMPLETE CBC AUTOMATED: CPT | Performed by: INTERNAL MEDICINE

## 2020-11-13 PROCEDURE — 80053 COMPREHEN METABOLIC PANEL: CPT | Performed by: INTERNAL MEDICINE

## 2020-12-02 ENCOUNTER — HOSPITAL ENCOUNTER (OUTPATIENT)
Dept: MAMMOGRAPHY | Facility: CLINIC | Age: 70
Discharge: HOME | End: 2020-12-02
Attending: INTERNAL MEDICINE | Admitting: INTERNAL MEDICINE
Payer: MEDICARE

## 2020-12-02 DIAGNOSIS — Z12.31 ENCOUNTER FOR SCREENING MAMMOGRAM FOR BREAST CANCER: ICD-10-CM

## 2020-12-02 PROCEDURE — 77067 SCR MAMMO BI INCL CAD: CPT

## 2020-12-03 ENCOUNTER — ANCILLARY PROCEDURE (OUTPATIENT)
Dept: BONE DENSITY | Facility: CLINIC | Age: 70
End: 2020-12-03
Attending: INTERNAL MEDICINE
Payer: MEDICARE

## 2020-12-03 DIAGNOSIS — M81.0 OSTEOPOROSIS, UNSPECIFIED OSTEOPOROSIS TYPE, UNSPECIFIED PATHOLOGICAL FRACTURE PRESENCE: ICD-10-CM

## 2020-12-03 DIAGNOSIS — Z78.0 ASYMPTOMATIC POSTMENOPAUSAL STATUS: ICD-10-CM

## 2020-12-03 PROCEDURE — 77080 DXA BONE DENSITY AXIAL: CPT | Performed by: INTERNAL MEDICINE

## 2021-02-19 ENCOUNTER — MYC MEDICAL ADVICE (OUTPATIENT)
Dept: INTERNAL MEDICINE | Facility: CLINIC | Age: 71
End: 2021-02-19

## 2021-05-20 ENCOUNTER — OFFICE VISIT (OUTPATIENT)
Dept: INTERNAL MEDICINE | Facility: CLINIC | Age: 71
End: 2021-05-20
Payer: MEDICARE

## 2021-05-20 ENCOUNTER — ANCILLARY PROCEDURE (OUTPATIENT)
Dept: GENERAL RADIOLOGY | Facility: CLINIC | Age: 71
End: 2021-05-20
Attending: INTERNAL MEDICINE
Payer: MEDICARE

## 2021-05-20 VITALS
HEIGHT: 63 IN | OXYGEN SATURATION: 99 % | BODY MASS INDEX: 24.8 KG/M2 | TEMPERATURE: 98 F | SYSTOLIC BLOOD PRESSURE: 134 MMHG | HEART RATE: 86 BPM | DIASTOLIC BLOOD PRESSURE: 74 MMHG | RESPIRATION RATE: 14 BRPM | WEIGHT: 140 LBS

## 2021-05-20 DIAGNOSIS — M54.50 ACUTE BILATERAL LOW BACK PAIN WITHOUT SCIATICA: Primary | ICD-10-CM

## 2021-05-20 DIAGNOSIS — M54.50 ACUTE BILATERAL LOW BACK PAIN WITHOUT SCIATICA: ICD-10-CM

## 2021-05-20 PROCEDURE — 99213 OFFICE O/P EST LOW 20 MIN: CPT | Performed by: INTERNAL MEDICINE

## 2021-05-20 PROCEDURE — 72100 X-RAY EXAM L-S SPINE 2/3 VWS: CPT | Performed by: RADIOLOGY

## 2021-05-20 RX ORDER — IBUPROFEN 600 MG/1
600 TABLET, FILM COATED ORAL EVERY 8 HOURS PRN
Qty: 60 TABLET | Refills: 0 | Status: SHIPPED | OUTPATIENT
Start: 2021-05-20

## 2021-05-20 RX ORDER — CYCLOBENZAPRINE HCL 5 MG
5 TABLET ORAL 2 TIMES DAILY PRN
Qty: 45 TABLET | Refills: 0 | Status: SHIPPED | OUTPATIENT
Start: 2021-05-20 | End: 2022-03-08

## 2021-05-20 ASSESSMENT — MIFFLIN-ST. JEOR: SCORE: 1129.17

## 2021-05-20 ASSESSMENT — ENCOUNTER SYMPTOMS
ARTHRALGIAS: 1
BACK PAIN: 1

## 2021-05-20 NOTE — PROGRESS NOTES
"    Assessment & Plan   Problem List Items Addressed This Visit     None      Visit Diagnoses     Acute bilateral low back pain without sciatica    -  Primary    Relevant Medications    cyclobenzaprine (FLEXERIL) 5 MG tablet    ibuprofen (ADVIL/MOTRIN) 600 MG tablet    Other Relevant Orders    XR Lumbar Spine 2/3 Views         No red flags.  X-ray ordered.  A combination of Ativan and muscle relaxer given.  Advise follow-up if symptoms does not improve.    No follow-ups on file.    Greg Tomas MD  [facility] MAICOL Murray is a 70 year old who presents for the following health issues : low back pain since 5/17/2021, denies injury.    HPI   Pain is across low back. She was cutting grass prior to the episode.  Pain started on Monday. She went to yoga on Monday and couldn't do yoga due to pain.  Denies fall/injury.  Denies similar pain in the past.  9/10 in severity, it hurts couldn't describe the pain.  Any movement worsens the pain, tried tylenol and didn't help.  Denies radiation of pain to LE. Denies numbness/tingling int he legs.  She has osteoporosis and take boniva    Review of Systems   Musculoskeletal: Positive for arthralgias and back pain.            Objective    /74   Pulse 86   Temp 98  F (36.7  C) (Oral)   Resp 14   Ht 1.6 m (5' 3\")   Wt 63.5 kg (140 lb)   LMP  (LMP Unknown)   SpO2 99%   Breastfeeding No   BMI 24.80 kg/m    Body mass index is 24.8 kg/m .  Physical Exam  Musculoskeletal:      Comments: Discomfort palpated on the left lower back around L3-L4-L5 region.  No spinal or paraspinal tenderness.  SLR negative bilaterally.              "

## 2021-05-20 NOTE — PATIENT INSTRUCTIONS
Patient Education     Back Care Tips     Caring for your back  These are things you can do to prevent a recurrence of acute back pain and to reduce symptoms from chronic back pain:    Stay at a healthy weight. If you are overweight, losing weight will help most types of back pain.    Exercise is an important part of recovery from most types of back pain. The muscles behind and in front of the spine support the back. This means strengthening both the back muscles and the abdominal muscles will provide better support for your spine.     Swimming and brisk walking are good overall exercises to improve your fitness level.    Practice safe lifting methods (see below).    Practice good posture when sitting, standing, and walking. Don't sit for a long time. This puts more stress on the lower back than standing or walking.    Wear quality shoes with good arch support. Foot and ankle alignment can affect back symptoms. Don't wear high heels.    Therapeutic massage can help relax the back muscles without stretching them.    During the first 24 to 72 hours after an acute injury or flare-up of chronic back pain, put an ice pack on the painful area for 20 minutes and then remove it for 20 minutes. Do thisover a period of 60 to 90 minutes, or several times a day. As a safety precaution, don't use a heating pad at bedtime. Sleeping on a heating pad can lead to skin burns or tissue damage.    You can alternate using ice and heat.  Medicines  Talk with your healthcare provider before using medicines, especially if you have other health problems or are taking other medicines.    You may use over-the-counter medicines, such as acetaminophen, ibuprofen, or naprosyn to control pain, unless your healthcare provider prescribed other pain medicine. Talk with your healthcare provider before taking any medicines if you have a chronic condition such as diabetes, liver or kidney disease, stomach ulcers, or digestive bleeding, or are taking  blood thinners.    Be careful if you are given prescription pain medicines, opioids, or medicine for muscle spasm. They can cause drowsiness, and affect your coordination, reflexes, and judgment. Don't drive or operate heavy machinery while taking these types of medicines. Take prescription pain medicine only as prescribed by your healthcare provider.  Lumbar stretch  This simple stretch will help relax muscle spasm and keep your back more limber. If exercise makes your back pain worse, don t do it.    Lie on your back with your knees bent and both feet on the ground.    Slowly raise your left knee to your chest as you flatten your lower back against the floor. Hold for 5 seconds.    Relax and repeat the exercise with your right knee.    Do 10 of these exercises for each leg.  Safe lifting method    Don t bend over at the waist to lift an object off the floor.  Instead, bend your knees and hips in a squat.     Keep your back and head upright    Hold the object close to your body, directly in front of you.    Straighten your legs to lift the object.     Lower the object to the floor in the reverse fashion.    If you must slide something across the floor, push it.    Posture tips  Sitting  Sit in chairs with straight backs or low-back support. Keep your knees lower than your hips, with your feet flat on the floor.  When driving, sit up straight. Adjust the seat forward so you are not leaning toward the steering wheel.  A small pillow or rolled towel behind your lower back may help if you are driving long distances.   Standing  When standing for long periods, shift most of your weight to one leg at a time. Switch legs every few minutes.   Sleeping  The best way to sleep is on your side with your knees bent. Put a low pillow under your head to support your neck in a neutral spine position. Don't use thick pillows that bend your neck to one side. Put a pillow between your legs to further relax your lower back. If you  sleep on your back, put pillows under your knees to support your legs in a slightly flexed position. Use a firm mattress. If your mattress sags, replace it, or use a 1/2-inch plywood board under the mattress to add support.  Follow-up care  Follow up with your healthcare provider, or as advised.  If X-rays, a CT scan or an MRI scan were taken, they may be reviewed by a radiologist. You will be told of any new findings that may affect your care.  Call 911  Call 911 if any of the following occur:    Trouble breathing    Confusion    Very drowsy    Fainting or loss of consciousness    Rapid or very slow heart rate    Loss of  bowel or bladder control  When to seek medical advice  Call your healthcare provider right away if any of the following occur:    Pain becomes worse or spreads to your arms or legs    Weakness or numbness in one or both arms or legs    Numbness in the groin area  [employer] last reviewed this educational content on 11/1/2019 2000-2021 [employer]. All rights reserved. This information is not intended as a substitute for professional medical care. Always follow your healthcare professional's instructions.

## 2021-09-04 ENCOUNTER — HEALTH MAINTENANCE LETTER (OUTPATIENT)
Age: 71
End: 2021-09-04

## 2021-12-25 ENCOUNTER — HEALTH MAINTENANCE LETTER (OUTPATIENT)
Age: 71
End: 2021-12-25

## 2022-01-03 ENCOUNTER — TELEPHONE (OUTPATIENT)
Dept: INTERNAL MEDICINE | Facility: CLINIC | Age: 72
End: 2022-01-03
Payer: MEDICARE

## 2022-01-03 DIAGNOSIS — M81.0 OSTEOPOROSIS, UNSPECIFIED OSTEOPOROSIS TYPE, UNSPECIFIED PATHOLOGICAL FRACTURE PRESENCE: ICD-10-CM

## 2022-01-03 DIAGNOSIS — E78.5 HYPERLIPIDEMIA LDL GOAL <130: ICD-10-CM

## 2022-01-03 NOTE — TELEPHONE ENCOUNTER
Routing refill request to provider for review/approval because:  Lab work     Annamaria Gordon RN

## 2022-01-04 RX ORDER — IBANDRONATE SODIUM 150 MG/1
TABLET, FILM COATED ORAL
Qty: 3 TABLET | Refills: 3 | Status: SHIPPED | OUTPATIENT
Start: 2022-01-04 | End: 2022-12-16

## 2022-01-04 RX ORDER — SIMVASTATIN 40 MG
40 TABLET ORAL AT BEDTIME
Qty: 90 TABLET | Refills: 0 | Status: SHIPPED | OUTPATIENT
Start: 2022-01-04 | End: 2022-03-08

## 2022-01-05 NOTE — TELEPHONE ENCOUNTER
Patient made annual appointment for March. Orders can be entered for her labs .     Can RX be refilled?     Thank you

## 2022-01-05 NOTE — TELEPHONE ENCOUNTER
Attempted to call patient to advised that MD will order labs at her appointment and does not order them to be done prior to annual visit.  Also tell her that rxs were refilled.     Home, cell and emergency contact numbers are all the same and are no longer in service. JEN Li R.N.

## 2022-02-04 ENCOUNTER — HOSPITAL ENCOUNTER (EMERGENCY)
Facility: CLINIC | Age: 72
Discharge: HOME | End: 2022-02-04
Attending: PHYSICIAN ASSISTANT | Admitting: PHYSICIAN ASSISTANT
Payer: MEDICARE

## 2022-02-04 VITALS
RESPIRATION RATE: 18 BRPM | SYSTOLIC BLOOD PRESSURE: 142 MMHG | HEART RATE: 67 BPM | OXYGEN SATURATION: 97 % | TEMPERATURE: 98.8 F | DIASTOLIC BLOOD PRESSURE: 85 MMHG

## 2022-02-04 DIAGNOSIS — N30.01 ACUTE CYSTITIS WITH HEMATURIA: ICD-10-CM

## 2022-02-04 LAB
ALBUMIN UR-MCNC: 50 MG/DL
APPEARANCE UR: ABNORMAL
BILIRUB UR QL STRIP: NEGATIVE
COLOR UR AUTO: YELLOW
GLUCOSE UR STRIP-MCNC: NEGATIVE MG/DL
HGB UR QL STRIP: ABNORMAL
KETONES UR STRIP-MCNC: NEGATIVE MG/DL
LEUKOCYTE ESTERASE UR QL STRIP: ABNORMAL
NITRATE UR QL: NEGATIVE
PH UR STRIP: 5 [PH] (ref 5–7)
RBC URINE: >182 /HPF
SP GR UR STRIP: 1.02 (ref 1–1.03)
SQUAMOUS EPITHELIAL: <1 /HPF
UROBILINOGEN UR STRIP-MCNC: NORMAL MG/DL
WBC URINE: >182 /HPF

## 2022-02-04 PROCEDURE — 99283 EMERGENCY DEPT VISIT LOW MDM: CPT

## 2022-02-04 PROCEDURE — 87086 URINE CULTURE/COLONY COUNT: CPT | Performed by: PHYSICIAN ASSISTANT

## 2022-02-04 PROCEDURE — 81001 URINALYSIS AUTO W/SCOPE: CPT | Performed by: PHYSICIAN ASSISTANT

## 2022-02-04 RX ORDER — NITROFURANTOIN 25; 75 MG/1; MG/1
100 CAPSULE ORAL 2 TIMES DAILY
Qty: 10 CAPSULE | Refills: 0 | Status: SHIPPED | OUTPATIENT
Start: 2022-02-04 | End: 2022-02-09

## 2022-02-04 ASSESSMENT — ENCOUNTER SYMPTOMS
ABDOMINAL PAIN: 0
BACK PAIN: 0
DYSURIA: 1
HEMATURIA: 0
FEVER: 0

## 2022-02-04 NOTE — DISCHARGE INSTRUCTIONS
*You may resume diet and activities as tolerated.  *Take medications as prescribed.  Continue your current medications.  *Follow up with your doctor in the next 2-3 days for a recheck as needed if symptoms persist.,  *Return if you develop fever, back pain, vomiting, unable to urinate, faint or feel like you will faint or become worse in any way.       Discharge Instructions  Urinary Tract Infection  You or your child have been diagnosed with a urinary tract infection, or UTI. The urinary tract includes the kidneys (which make urine/pee), ureters (the tubes that carry urine/pee from the kidneys to the bladder), the bladder (which stores urine/pee), and urethra (the tube that carries urine/pee out of the bladder). Urinary tract infections occur when bacteria travel up the urethra into the bladder (bladder infection) and, in some cases, from there into the kidneys (kidney infection).  Generally, every Emergency Department visit should have a follow-up clinic visit with either a primary or a specialty clinic/provider. Please follow-up as instructed by your emergency provider today.  Return to the Emergency Department if:  You or your child have severe back pain.  You or your child are vomiting (throwing up) so that you cannot take your medicine.  You or your child have a new fever (had not previously had a fever) over 101 F.  You or your child have confusion or are very weak, or feel very ill.  Your child seems much more ill, will not wake up, will not respond right, or is crying for a long time and will not calm down.  You or your child are showing signs of dehydration. These signs may include decreased urination (pee), dry mouth/gums/tongue, or decreased activity.    Follow-up with your provider:   Children under 24 months need to be seen by their regular provider within one week after a diagnosis of a UTI. It may be necessary to do some more tests to look at the child s kidney or bladder.  You should begin to feel  better within 24 - 48 hours of starting your antibiotic; follow-up with your regular clinic/doctor/provider if this is not the case.    Treatment:   You will be treated with an antibiotic to kill the bacteria. We have to make an educated guess, based on what we know about common bacteria and antibiotics, as to which antibiotic will work for your infection. We will be correct most times but there will be some cases where the antibiotic chosen is not correct (see urine cultures below).  Take a pain medication such as acetaminophen (Tylenol ) or ibuprofen (Advil , Motrin , Nuprin ).  Phenazopyridine (Pyridium , Uristat ) is a prescription medication that numbs the bladder to reduce the burning pain of some UTIs.  The same medication is available in a non-prescription version (Azo-Standard , Urodol ). This medication will change the color of the urine and tears (usually blue or orange). If you wear contacts, do not wear them while taking this medication as they may be stained by the medication.    Urine Cultures:  If indicated, a urine culture may have been performed today. This test generally takes 24-48 hours to complete so the results are not known at this time. The results can confirm that an infection is present but also determine which antibiotic is effective for the specific bacteria that is causing the infection. If your urine culture shows that the antibiotic you were given today will not work to treat your infection, we will attempt to contact you to make arrangements to change the antibiotic. If the culture confirms that the antibiotic is effective for your infection, you will not be contacted. We often recommend follow-up with your regular physician/provider on the culture results regardless of this process.    Antibiotic Warning:   If you have been placed on antibiotics - watch for signs of allergic reaction.  These include rash, lip swelling, difficulty breathing, wheezing, and dizziness.  If you develop  "any of these symptoms, stop the antibiotic immediately and go to an emergency room or urgent care for evaluation.    Probiotics: If you have been given an antibiotic, you may want to also take a probiotic pill or eat yogurt with live cultures. Probiotics have \"good bacteria\" to help your intestines stay healthy. Studies have shown that probiotics help prevent diarrhea and other intestine problems (including C. diff infection) when you take antibiotics. You can buy these without a prescription in the pharmacy section of the store.   If you were given a prescription for medicine here today, be sure to read all of the information (including the package insert) that comes with your prescription.  This will include important information about the medicine, its side effects, and any warnings that you need to know about.  The pharmacist who fills the prescription can provide more information and answer questions you may have about the medicine.  If you have questions or concerns that the pharmacist cannot address, please call or return to the Emergency Department.   Remember that you can always come back to the Emergency Department if you are not able to see your regular provider in the amount of time listed above, if you get any new symptoms, or if there is anything that worries you.   "

## 2022-02-04 NOTE — ED PROVIDER NOTES
History   Chief Complaint:  Dysuria     HPI   Terri Ratliff is a 71 year old female with history of UTIs who presents with dysuria. The patient denies any hematuria, fevers, back pain, or abdominal pain. She reports she had UTIs a few years ago.    Review of Systems   Constitutional: Negative for fever.   Gastrointestinal: Negative for abdominal pain.   Genitourinary: Positive for dysuria. Negative for hematuria.   Musculoskeletal: Negative for back pain.   All other systems reviewed and are negative.    Allergies:  The patient has no known allergies.     Medications:  Flexeril  Boniva  Zocor    Past Medical History:     Osteoporosis  Hyperlipidemia  Mechanical low back pain   Impaired fasting glucose   UTIs    Past Surgical History:    Bladder surgery  Hysterectomy  Knee surgery  Appendectomy   Lumbar disc surgery    Family History:    Father: Alcohol/drug abuse  Mother: Diabetes, Cataracts  Sister: Hypertension  Brother: Leukemia    Social History:  The patient presents alone. She works as a teacher.    Physical Exam     Patient Vitals for the past 24 hrs:   BP Temp Temp src Pulse Resp SpO2   02/04/22 1345 (!) 142/85 98.8  F (37.1  C) Oral 67 18 97 %       Physical Exam  General: Alert, cooperative   Head:  Scalp is atraumatic.  Eyes:  Normal conjunctiva.   ENT:                                      Ears:  The external ears are normal.   Nose:  The external nose is normal.  Throat:  The oropharynx is normal. Mucus membranes are moist.                 Neck:  Normal range of motion.   CV:  Normal rate. No murmur. 2+ radial pulses  Resp:  Breath sounds are clear bilaterally. Non-labored, no retractions or accessory muscle use.  GI:  Abdomen is soft, no distension, no tenderness. No CVA tenderness.   MS:  Normal range of motion. No acute deformities.   Skin:  Warm and dry. No rash.   Neuro:  Alert. Strength and sensation grossly intact.   Psych:  Awake. Alert.  Appropriate interactions.     Emergency Department  Course     Laboratory:  Labs Ordered and Resulted from Time of ED Arrival to Time of ED Departure   ROUTINE UA WITH MICROSCOPIC REFLEX TO CULTURE - Abnormal       Result Value    Color Urine Yellow      Appearance Urine Slightly Cloudy (*)     Glucose Urine Negative      Bilirubin Urine Negative      Ketones Urine Negative      Specific Gravity Urine 1.021      Blood Urine Large (*)     pH Urine 5.0      Protein Albumin Urine 50  (*)     Urobilinogen Urine Normal      Nitrite Urine Negative      Leukocyte Esterase Urine Large (*)     RBC Urine >182 (*)     WBC Urine >182 (*)     Squamous Epithelials Urine <1     URINE CULTURE        Procedures  None    Emergency Department Course:  Reviewed:  I reviewed nursing notes, vitals, past medical history and Care Everywhere    Assessments:  1418 I obtained history and examined the patient as noted above.    Disposition:  The patient was discharged to home.     Impression & Plan     CMS Diagnoses: None    Medical Decision Making:  Terri Ratliff is a 71 year old female who presents for evaluation of urinary symptoms.  This clinically is consistent with a urinary tract infection.  Urinalysis confirms the infection.  There has been no fever, back/flank pain or significant abdominal pain.  There is no clinical evidence of pyelonephritis, appendicitis, colitis, diverticulitis or any intraabdominal catastrophe. The patient will be started on antibiotics for the infection. Return if increasing pain, vomiting, fever, or inability to tolerate the oral antibiotic.  Follow up with primary physician is indicated if not improving in 2-3 days.        Diagnosis:    ICD-10-CM    1. Acute cystitis with hematuria  N30.01        Discharge Medications:  New Prescriptions    NITROFURANTOIN MACROCRYSTAL-MONOHYDRATE (MACROBID) 100 MG CAPSULE    Take 1 capsule (100 mg) by mouth 2 times daily for 5 days       Scribe Disclosure:  Mikaela SADLER, am serving as a scribe at 2:01 PM on 2/4/2022  to document services personally performed by Yesica Khan PA-C based on my observations and the provider's statements to me.      Yesica Khan PA-C  02/04/22 6811

## 2022-02-04 NOTE — ED TRIAGE NOTES
"A&O x4.  ABC's intact.      Pt arrives with c/o hematuria, frequency, dysuria and \"slimey stuff\" in urine today x 4 times.    "

## 2022-02-05 LAB — BACTERIA UR CULT: NO GROWTH

## 2022-02-06 ENCOUNTER — TELEPHONE (OUTPATIENT)
Dept: EMERGENCY MEDICINE | Facility: CLINIC | Age: 72
End: 2022-02-06
Payer: MEDICARE

## 2022-02-06 NOTE — TELEPHONE ENCOUNTER
"[facility] Emergency Department/Urgent Care Lab result notification:    [facility] ED lab result protocol used  Urine Culture    Reason for call  Notify of lab results, assess symptoms,  review ED providers recommendations/discharge instructions (if necessary) and advise per ED lab result f/u protocol    Lab Result   Final urine culture report shows \"NO GROWTH\" and is NEGATIVE.  [facility] Emergency Dept discharge antibiotic: Nitrofurantoin Macrocrystal-Monohydrate (Macrobid) 100 mg PO capsule, 1 capsule (100 mg) by mouth 2 times daily for 5 days  [facility] Emergency Dept discharge Rx antibiotic for UTI only (Yes/No): YES  RN to confirm if Patient took antibiotic within 3 days prior to urine culture collection.  Recommendations in treatment per [facility] ED Lab result Urine culture protocol.    Information table from Emergency Dept Provider visit on 2/4/22  Symptoms reported at ED visit (Chief complaint, HPI) Chief Complaint:  Dysuria      HPI   Terri Ratliff is a 71 year old female with history of UTIs who presents with dysuria. The patient denies any hematuria, fevers, back pain, or abdominal pain. She reports she had UTIs a few years ago.   ED providers Impression and Plan (applicable information) Medical Decision Making:  Terri Ratliff is a 71 year old female who presents for evaluation of urinary symptoms.  This clinically is consistent with a urinary tract infection.  Urinalysis confirms the infection.  There has been no fever, back/flank pain or significant abdominal pain.  There is no clinical evidence of pyelonephritis, appendicitis, colitis, diverticulitis or any intraabdominal catastrophe. The patient will be started on antibiotics for the infection. Return if increasing pain, vomiting, fever, or inability to tolerate the oral antibiotic.  Follow up with primary physician is indicated if not improving in 2-3 days.     Miscellaneous information None     RN Assessment (Patient s " current Symptoms), include time called.   Antibiotic 3 days prior to labs: NO  Symptoms: denies urinary symptoms or fever      RN Recommendations/Instructions per [facility] lab result protocol  3:49P - Patient notified of lab result and treatment recommendations of no growth and appropriate to stop antibiotic since patient reporting no antibiotics 3 days prior to labs and no urinary symptoms at this time    Please Contact your PCP clinic or return to the Emergency department if your:    Symptoms return.    Symptoms worsen or other concerning symptom's.        Sweetie Gant RN  [facility]  Emergency Dept Lab Result RN  Ph# [phone]     Copy of Lab result   Urine Culture  Order: [id number] - Reflex for Order [id number]   Status: Final result     Visible to patient: Yes (not seen)    Specimen Information: Urine, Clean Catch         2 Result Notes    Culture No Growth            Resulting Agency: [facility]           Specimen Collected: 02/04/22  2:01 PM Last Resulted: 02/05/22  8:15 PM

## 2022-02-19 ENCOUNTER — HEALTH MAINTENANCE LETTER (OUTPATIENT)
Age: 72
End: 2022-02-19

## 2022-03-08 ENCOUNTER — OFFICE VISIT (OUTPATIENT)
Dept: INTERNAL MEDICINE | Facility: CLINIC | Age: 72
End: 2022-03-08
Payer: MEDICARE

## 2022-03-08 VITALS
OXYGEN SATURATION: 99 % | HEIGHT: 63 IN | RESPIRATION RATE: 18 BRPM | HEART RATE: 71 BPM | WEIGHT: 143.3 LBS | TEMPERATURE: 97 F | BODY MASS INDEX: 25.39 KG/M2

## 2022-03-08 DIAGNOSIS — Z00.00 ROUTINE GENERAL MEDICAL EXAMINATION AT A HEALTH CARE FACILITY: Primary | ICD-10-CM

## 2022-03-08 DIAGNOSIS — Z12.31 ENCOUNTER FOR SCREENING MAMMOGRAM FOR BREAST CANCER: ICD-10-CM

## 2022-03-08 DIAGNOSIS — M81.0 OSTEOPOROSIS, UNSPECIFIED OSTEOPOROSIS TYPE, UNSPECIFIED PATHOLOGICAL FRACTURE PRESENCE: ICD-10-CM

## 2022-03-08 DIAGNOSIS — E78.5 HYPERLIPIDEMIA LDL GOAL <130: ICD-10-CM

## 2022-03-08 DIAGNOSIS — R73.9 BLOOD SUGAR INCREASED: ICD-10-CM

## 2022-03-08 DIAGNOSIS — R03.0 ELEVATED BLOOD PRESSURE READING WITHOUT DIAGNOSIS OF HYPERTENSION: ICD-10-CM

## 2022-03-08 PROBLEM — R73.01 IMPAIRED FASTING GLUCOSE: Status: ACTIVE | Noted: 2022-03-08

## 2022-03-08 LAB
ALBUMIN SERPL-MCNC: 3.7 G/DL (ref 3.4–5)
ALP SERPL-CCNC: 72 U/L (ref 40–150)
ALT SERPL W P-5'-P-CCNC: 28 U/L (ref 0–50)
ANION GAP SERPL CALCULATED.3IONS-SCNC: 8 MMOL/L (ref 3–14)
AST SERPL W P-5'-P-CCNC: 14 U/L (ref 0–45)
BILIRUB SERPL-MCNC: 0.6 MG/DL (ref 0.2–1.3)
BUN SERPL-MCNC: 15 MG/DL (ref 7–30)
CALCIUM SERPL-MCNC: 8.8 MG/DL (ref 8.5–10.1)
CHLORIDE BLD-SCNC: 110 MMOL/L (ref 94–109)
CHOLEST SERPL-MCNC: 183 MG/DL
CK SERPL-CCNC: 69 U/L (ref 30–225)
CO2 SERPL-SCNC: 24 MMOL/L (ref 20–32)
CREAT SERPL-MCNC: 0.94 MG/DL (ref 0.52–1.04)
ERYTHROCYTE [DISTWIDTH] IN BLOOD BY AUTOMATED COUNT: 12.3 % (ref 10–15)
FASTING STATUS PATIENT QL REPORTED: YES
GFR SERPL CREATININE-BSD FRML MDRD: 65 ML/MIN/1.73M2
GLUCOSE BLD-MCNC: 109 MG/DL (ref 70–99)
HBA1C MFR BLD: 5.7 % (ref 0–5.6)
HCT VFR BLD AUTO: 40.3 % (ref 35–47)
HDLC SERPL-MCNC: 58 MG/DL
HGB BLD-MCNC: 13.4 G/DL (ref 11.7–15.7)
LDLC SERPL CALC-MCNC: 106 MG/DL
MCH RBC QN AUTO: 30.8 PG (ref 26.5–33)
MCHC RBC AUTO-ENTMCNC: 33.3 G/DL (ref 31.5–36.5)
MCV RBC AUTO: 93 FL (ref 78–100)
NONHDLC SERPL-MCNC: 125 MG/DL
PLATELET # BLD AUTO: 193 10E3/UL (ref 150–450)
POTASSIUM BLD-SCNC: 4.2 MMOL/L (ref 3.4–5.3)
PROT SERPL-MCNC: 7.2 G/DL (ref 6.8–8.8)
RBC # BLD AUTO: 4.35 10E6/UL (ref 3.8–5.2)
SODIUM SERPL-SCNC: 142 MMOL/L (ref 133–144)
TRIGL SERPL-MCNC: 95 MG/DL
TSH SERPL DL<=0.005 MIU/L-ACNC: 2.71 MU/L (ref 0.4–4)
WBC # BLD AUTO: 4.3 10E3/UL (ref 4–11)

## 2022-03-08 PROCEDURE — 84443 ASSAY THYROID STIM HORMONE: CPT | Performed by: INTERNAL MEDICINE

## 2022-03-08 PROCEDURE — 99213 OFFICE O/P EST LOW 20 MIN: CPT | Mod: 25 | Performed by: INTERNAL MEDICINE

## 2022-03-08 PROCEDURE — 36415 COLL VENOUS BLD VENIPUNCTURE: CPT | Performed by: INTERNAL MEDICINE

## 2022-03-08 PROCEDURE — 85027 COMPLETE CBC AUTOMATED: CPT | Performed by: INTERNAL MEDICINE

## 2022-03-08 PROCEDURE — 80053 COMPREHEN METABOLIC PANEL: CPT | Performed by: INTERNAL MEDICINE

## 2022-03-08 PROCEDURE — 82550 ASSAY OF CK (CPK): CPT | Performed by: INTERNAL MEDICINE

## 2022-03-08 PROCEDURE — 83036 HEMOGLOBIN GLYCOSYLATED A1C: CPT | Performed by: INTERNAL MEDICINE

## 2022-03-08 PROCEDURE — 80061 LIPID PANEL: CPT | Performed by: INTERNAL MEDICINE

## 2022-03-08 PROCEDURE — G0439 PPPS, SUBSEQ VISIT: HCPCS | Performed by: INTERNAL MEDICINE

## 2022-03-08 RX ORDER — SIMVASTATIN 40 MG
40 TABLET ORAL AT BEDTIME
Qty: 90 TABLET | Refills: 0 | Status: SHIPPED | OUTPATIENT
Start: 2022-03-08 | End: 2022-03-23

## 2022-03-08 ASSESSMENT — ENCOUNTER SYMPTOMS
HEMATOCHEZIA: 0
HEMATURIA: 0
DIZZINESS: 0
EYE PAIN: 0
ABDOMINAL PAIN: 0
CHILLS: 0
DIARRHEA: 0
CONSTIPATION: 0
COUGH: 0

## 2022-03-08 ASSESSMENT — ACTIVITIES OF DAILY LIVING (ADL): CURRENT_FUNCTION: NO ASSISTANCE NEEDED

## 2022-03-08 NOTE — PROGRESS NOTES
Dr Theodore's note    Patient's instructions / PLAN:                                                        Plan:  1. Stop Ibandronate You have taken it 2017 -2022  2.  Labs today - [address]   3. Mammogram ( please call [phone] to schedule it)   4. Monitor the blood pressure   5. Schedule video appointment in about a month for the blood pressure         ASSESSMENT & PLAN:                                                      (Z00.00) Routine general medical examination at a health care facility  (primary encounter diagnosis)  Comment:   Plan: simvastatin (ZOCOR) 40 MG tablet, CBC with         platelets, CK total, Comprehensive metabolic         panel, Lipid panel reflex to direct LDL         Fasting, TSH with free T4 reflex            (E78.5) Hyperlipidemia LDL goal <130  Comment:   Plan: simvastatin (ZOCOR) 40 MG tablet, CBC with         platelets, CK total, Comprehensive metabolic         panel, Lipid panel reflex to direct LDL         Fasting, TSH with free T4 reflex            (M81.0) Osteoporosis, Fosamax [phone], Boniva [phone]  Comment:   Plan: as above     (Z12.31) Encounter for screening mammogram for breast cancer  Comment:   Plan: MA Screening Digital Bilateral            (R03.0) Elevated blood pressure reading without diagnosis of hypertension  Comment:   Plan: as above     (R73.09) Blood sugar increased  Comment:   Plan: Hemoglobin A1c       Chief Complaint:                                                        Annual exam  Follow up chronic medical problems      SUBJECTIVE:                                                    History of present illness     We reviewed the chronic medical problems as above.   I reviewed the recent tests results in Epic       ROS:     See below      PMHx: - reviewed  Past Medical History:   Diagnosis Date     Osteoporosis        PSHx: reviewed  Past Surgical History:   Procedure Laterality Date     BLADDER SURGERY  2009    sling      COLONOSCOPY  2005      COLONOSCOPY  2015    Dr. Mccarthy [facility]     COLONOSCOPY N/A 2015    Procedure: COLONOSCOPY;  Surgeon: Ted Mccarthy MD;  Location:  GI     GYN SURGERY       HYSTERECTOMY, PAP NO LONGER INDICATED      ovaries are present     KNEE SURGERY       ORTHOPEDIC SURGERY       ZZC APPENDECTOMY          Soc Hx: No daily alcohol, no smoking  Social History     Socioeconomic History     Marital status:      Spouse name: Not on file     Number of children: 2     Years of education: Not on file     Highest education level: Not on file   Occupational History     Occupation: Teacher     Employer: [employer]   Tobacco Use     Smoking status: Never Smoker     Smokeless tobacco: Never Used   Vaping Use     Vaping Use: Never used   Substance and Sexual Activity     Alcohol use: Yes     Comment: occasional     Drug use: No     Sexual activity: Yes     Partners: Male   Other Topics Concern     Parent/sibling w/ CABG, MI or angioplasty before 65F 55M? Not Asked   Social History Narrative    From [place] been in [place] years.     Social Determinants of Health     Financial Resource Strain: Not on file   Food Insecurity: Not on file   Transportation Needs: Not on file   Physical Activity: Not on file   Stress: Not on file   Social Connections: Not on file   Intimate Partner Violence: Not on file   Housing Stability: Not on file        Fam Hx: reviewed  Family History   Problem Relation Age of Onset     Alcohol/Drug Father          71yo drinker and smoker     Diabetes Mother          79yo and cataracts     Family History Negative Sister      Hypertension Sister      Cancer Brother          46yo Leukemia      No Known Problems Maternal Grandmother      No Known Problems Maternal Grandfather      No Known Problems Paternal Grandmother      No Known Problems Paternal Grandfather          Screening: reviewed      All: reviewed    Meds: reviewed  Current Outpatient Medications  "  Medication Sig Dispense Refill     cyclobenzaprine (FLEXERIL) 5 MG tablet Take 1 tablet (5 mg) by mouth 2 times daily as needed for muscle spasms 45 tablet 0     IBANdronate (BONIVA) 150 MG tablet TAKE 1 TABLET EVERY 30 DAYS 3 tablet 3     ibuprofen (ADVIL/MOTRIN) 600 MG tablet Take 1 tablet (600 mg) by mouth every 8 hours as needed for moderate pain 60 tablet 0     MULTIVITAMINS OR TABS 1 tablet daily       omega 3 1000 MG CAPS Take 1 g by mouth daily 90 capsule      simvastatin (ZOCOR) 40 MG tablet Take 1 tablet (40 mg) by mouth At Bedtime -- For further refills patient needs appointment 90 tablet 0     VITAMIN C 500 MG OR TABS 1 TABLET TWICE DAILY         OBJECTIVE:                                                    Physical Exam :  Pulse 71, temperature 97  F (36.1  C), temperature source Tympanic, resp. rate 18, height 1.6 m (5' 3\"), weight 65 kg (143 lb 4.8 oz), SpO2 99 %, not currently breastfeeding.     NAD, appears comfortable  Skin clear, no rashes  Neck: supple, no JVD,  no thyroidmegaly  Lymph nodes non palpable in the cervical, supraclavicular axillaries,   Chest: clear to auscultation with good respiratory effort  Cardiac: S1S2, RRR, no mgr appreciated  Abdomen: soft, not tender, not distended, audible bowel sound, no hepatosplenomegaly, no palpable masses, no abdominal bruits  Extremities: no cyanosis, clubbing or edema.   Neuro: A, Ox3, no focal signs.  Breast exam in supine and erect position: they are symmetrical, no skin changes, no tenderness or nodes on palpation. Nipples are erect, no skin lesions, no discharge on pressure.    Pelvic exam: deferred, s/p menopause, no symptoms, no hx of abnormal pap         Gabriela Theodore MD  Internal Medicine       SUBJECTIVE:   Terri Ratliff is a 71 year old female who presents for Preventive Visit.      Patient has been advised of split billing requirements and indicates understanding: Yes  Are you in the first 12 months of your Medicare coverage?  " "No    Healthy Habits:     In general, how would you rate your overall health?  Good    Frequency of exercise:  1 day/week    Duration of exercise:  45-60 minutes    Do you usually eat at least 4 servings of fruit and vegetables a day, include whole grains    & fiber and avoid regularly eating high fat or \"junk\" foods?  No    Taking medications regularly:  No    Barriers to taking medications:  None    Medication side effects:  None    Ability to successfully perform activities of daily living:  No assistance needed    Home Safety:  No safety concerns identified    Hearing Impairment:  No hearing concerns    In the past 6 months, have you been bothered by leaking of urine?  No    In general, how would you rate your overall mental or emotional health?  Good      PHQ-2 Total Score: 0    Additional concerns today:  No    Do you feel safe in your environment? Yes    Have you ever done Advance Care Planning? (For example, a Health Directive, POLST, or a discussion with a medical provider or your loved ones about your wishes): No, advance care planning information given to patient to review.  Patient declined advance care planning discussion at this time.       Fall risk  Fallen 2 or more times in the past year?: No  Any fall with injury in the past year?: No    Cognitive Screening   1) Repeat 3 items (Leader, Season, Table)    2) Clock draw: NORMAL  3) 3 item recall: Recalls 3 objects  Results: 3 items recalled: COGNITIVE IMPAIRMENT LESS LIKELY    Mini-CogTM Copyright MURIEL Castellano. Licensed by the author for use in [facility]; reprinted with permission (rayna@.[facility]). All rights reserved.      Do you have sleep apnea, excessive snoring or daytime drowsiness?: no    Reviewed and updated as needed this visit by clinical staff                  Reviewed and updated as needed this visit by Provider                 Social History     Tobacco Use     Smoking status: Never Smoker     Smokeless tobacco: Never Used " "  Substance Use Topics     Alcohol use: Yes     Comment: occasional         Alcohol Use 11/5/2020   Prescreen: >3 drinks/day or >7 drinks/week? No   Prescreen: >3 drinks/day or >7 drinks/week? -           Hyperlipidemia Follow-Up      Are you regularly taking any medication or supplement to lower your cholesterol?   Yes- simvastatin    Are you having muscle aches or other side effects that you think could be caused by your cholesterol lowering medication?  No      Current providers sharing in care for this patient include:   Patient Care Team:  Gabriela Acevedo MD as PCP - General (Internal Medicine)  Gabriela Acevedo MD as Assigned PCP    The following health maintenance items are reviewed in Epic and correct as of today:  Health Maintenance Due   Topic Date Due     ANNUAL REVIEW OF  ORDERS  Never done     ZOSTER IMMUNIZATION (1 of 2) Never done     Pneumococcal Vaccine: 65+ Years (2 of 2 - PPSV23) 11/09/2017     MEDICARE ANNUAL WELLNESS VISIT  11/05/2021     MAMMO SCREENING  12/02/2021     PHQ-2  01/01/2022     Labs reviewed in EPIC          Review of Systems   Constitutional: Negative for chills.   HENT: Negative for congestion and ear pain.    Eyes: Negative for pain.   Respiratory: Negative for cough.    Cardiovascular: Negative for chest pain.   Gastrointestinal: Negative for abdominal pain, constipation, diarrhea and hematochezia.   Genitourinary: Negative for hematuria.   Neurological: Negative for dizziness.         Patient has been advised of split billing requirements and indicates understanding: Yes At the check in, at the      COUNSELING:  Reviewed preventive health counseling, as reflected in patient instructions       Regular exercise       Healthy diet/nutrition    Estimated body mass index is 24.8 kg/m  as calculated from the following:    Height as of 5/20/21: 1.6 m (5' 3\").    Weight as of 5/20/21: 63.5 kg (140 lb).        She reports that she has never " smoked. She has never used smokeless tobacco.      Appropriate preventive services were discussed with this patient, including applicable screening as appropriate for cardiovascular disease, diabetes, osteopenia/osteoporosis, and glaucoma.  As appropriate for age/gender, discussed screening for colorectal cancer, prostate cancer, breast cancer, and cervical cancer. Checklist reviewing preventive services available has been given to the patient.    Reviewed patients plan of care and provided an AVS. The Basic Care Plan (routine screening as documented in Health Maintenance) for Terri meets the Care Plan requirement. This Care Plan has been established and reviewed with the Patient.    Counseling Resources:  ATP IV Guidelines  Pooled Cohorts Equation Calculator  Breast Cancer Risk Calculator  Breast Cancer: Medication to Reduce Risk  FRAX Risk Assessment  ICSI Preventive Guidelines  Dietary Guidelines for Americans, 2010  USDA's MyPlate  ASA Prophylaxis  Lung CA Screening    aGbriela Acevedo MD  [facility]    Identified Health Risks:

## 2022-03-08 NOTE — PATIENT INSTRUCTIONS
Plan:  1. Stop Ibandronate You have taken it 2017 -2022  2.  Labs today - [address]   3. Mammogram ( please call [phone] to schedule it)   4. Monitor the blood pressure   5. Schedule video appointment in about a month for the blood pressure

## 2022-03-15 ENCOUNTER — HOSPITAL ENCOUNTER (OUTPATIENT)
Dept: MAMMOGRAPHY | Facility: CLINIC | Age: 72
Discharge: HOME | End: 2022-03-15
Attending: INTERNAL MEDICINE | Admitting: INTERNAL MEDICINE
Payer: MEDICARE

## 2022-03-15 DIAGNOSIS — Z12.31 ENCOUNTER FOR SCREENING MAMMOGRAM FOR BREAST CANCER: ICD-10-CM

## 2022-03-15 PROCEDURE — 77067 SCR MAMMO BI INCL CAD: CPT

## 2022-03-22 DIAGNOSIS — E78.5 HYPERLIPIDEMIA LDL GOAL <130: ICD-10-CM

## 2022-03-22 DIAGNOSIS — Z00.00 ROUTINE GENERAL MEDICAL EXAMINATION AT A HEALTH CARE FACILITY: ICD-10-CM

## 2022-03-23 RX ORDER — SIMVASTATIN 40 MG
40 TABLET ORAL AT BEDTIME
Qty: 90 TABLET | Refills: 3 | Status: SHIPPED | OUTPATIENT
Start: 2022-03-23 | End: 2023-01-09

## 2022-04-11 ENCOUNTER — VIRTUAL VISIT (OUTPATIENT)
Dept: INTERNAL MEDICINE | Facility: CLINIC | Age: 72
End: 2022-04-11
Payer: MEDICARE

## 2022-04-11 DIAGNOSIS — E78.5 HYPERLIPIDEMIA LDL GOAL <130: ICD-10-CM

## 2022-04-11 DIAGNOSIS — I10 HTN, GOAL BELOW 140/90: Primary | ICD-10-CM

## 2022-04-11 PROCEDURE — 99213 OFFICE O/P EST LOW 20 MIN: CPT | Mod: 95 | Performed by: INTERNAL MEDICINE

## 2022-04-11 RX ORDER — CYCLOBENZAPRINE HCL 5 MG
TABLET ORAL
COMMUNITY
Start: 2021-05-20 | End: 2023-10-03

## 2022-04-11 RX ORDER — LISINOPRIL 5 MG/1
5 TABLET ORAL DAILY
Qty: 30 TABLET | Refills: 3 | Status: SHIPPED | OUTPATIENT
Start: 2022-04-11 | End: 2022-05-09

## 2022-04-11 NOTE — PROGRESS NOTES
Terri is a 71 year old who is being evaluated via a billable video visit.      How would you like to obtain your AVS? Mail a copy  If the video visit is dropped, the invitation should be resent by: Text to cell phone: [phone]  Will anyone else be joining your video visit? No        This is a VIDEO ( using Dox[employer])  encounter with the patient.     Video part didn't work     Location of the provider : office   Location of the patient : home      07:10 --- 07:25            Dr Theodore's note      Patient's instructions / PLAN:                                                        Plan:  1. Start Lisinopril for high blood pressure. Low salt diet helps   2. Please make a lab appointment for non fasting labs  In a month  3. Please make an appointment few days after the labs to discuss about the results.       ASSESSMENT & PLAN:                                                      (I10) HTN, goal below 140/90  (primary encounter diagnosis)  Comment:   Plan: lisinopril (ZESTRIL) 5 MG tablet, Basic         metabolic panel, Albumin Random Urine         Quantitative with Creat Ratio      (E78.5) Hyperlipidemia LDL goal <130  Comment:   Plan:       Chief complaint:                                                      HTN    SUBJECTIVE:                                                    History of present illness:    HTN  -- home BP: 140s- 150s    We talked about amlodipine but it interacts w simvastatin. She doesn't want to change the simvastatin   takes Lisinopril and I will prescribe it for her. She agrees     LOV: Plan:  1. Stop Ibandronate You have taken it 2017 -2022  2.  Labs today - [address]   3. Mammogram ( please call [phone] to schedule it)   4. Monitor the blood pressure   5. Schedule video appointment in about a month for the blood pressure           Hyperlipidemia Follow-Up      Are you regularly taking any medication or supplement to lower your cholesterol?   Yes- simvastatin    Are you  having muscle aches or other side effects that you think could be caused by your cholesterol lowering medication?  No      How many servings of fruits and vegetables do you eat daily?  0-1    On average, how many sweetened beverages do you drink each day (Examples: soda, juice, sweet tea, etc.  Do NOT count diet or artificially sweetened beverages)?   0    How many days per week do you exercise enough to make your heart beat faster? 3 or less    How many minutes a day do you exercise enough to make your heart beat faster? 9 or less    How many days per week do you miss taking your medication? 0      Review of Systems:                                                      ROS: negative for fever, chills, cough, wheezes, chest pain, shortness of breath, vomiting, abdominal pain, leg swelling       OBJECTIVE:           An actual physical exam can't be done during phone visit   A limited exam can sometimes be performed by video visit         PMHx: reviewed  Past Medical History:   Diagnosis Date     Osteoporosis       PSHx: reviewed  Past Surgical History:   Procedure Laterality Date     BLADDER SURGERY  2009    sling      COLONOSCOPY  2005     COLONOSCOPY  9/17/2015    Dr. Mccarthy [facility]     COLONOSCOPY N/A 9/17/2015    Procedure: COLONOSCOPY;  Surgeon: Ted Mccarthy MD;  Location:  GI     GYN SURGERY       HYSTERECTOMY, PAP NO LONGER INDICATED      ovaries are present     KNEE SURGERY       ORTHOPEDIC SURGERY       ZZC APPENDECTOMY          Meds: reviewed  Current Outpatient Medications   Medication Sig Dispense Refill     cyclobenzaprine (FLEXERIL) 5 MG tablet        IBANdronate (BONIVA) 150 MG tablet TAKE 1 TABLET EVERY 30 DAYS 3 tablet 3     ibuprofen (ADVIL/MOTRIN) 600 MG tablet Take 1 tablet (600 mg) by mouth every 8 hours as needed for moderate pain 60 tablet 0     MULTIVITAMINS OR TABS 1 tablet daily       omega 3 1000 MG CAPS Take 1 g by mouth daily 90 capsule      simvastatin (ZOCOR) 40 MG tablet Take 1  tablet (40 mg) by mouth At Bedtime 90 tablet 3     VITAMIN C 500 MG OR TABS 1 TABLET TWICE DAILY         Soc Hx: reviewed  Fam Hx: reviewed          Gabriela Theodore MD  Internal Medicine

## 2022-05-02 ENCOUNTER — LAB (OUTPATIENT)
Dept: LAB | Facility: CLINIC | Age: 72
End: 2022-05-02
Payer: MEDICARE

## 2022-05-02 DIAGNOSIS — I10 HTN, GOAL BELOW 140/90: ICD-10-CM

## 2022-05-02 LAB
ANION GAP SERPL CALCULATED.3IONS-SCNC: 4 MMOL/L (ref 3–14)
BUN SERPL-MCNC: 13 MG/DL (ref 7–30)
CALCIUM SERPL-MCNC: 9.2 MG/DL (ref 8.5–10.1)
CHLORIDE BLD-SCNC: 107 MMOL/L (ref 94–109)
CO2 SERPL-SCNC: 28 MMOL/L (ref 20–32)
CREAT SERPL-MCNC: 0.88 MG/DL (ref 0.52–1.04)
CREAT UR-MCNC: 101 MG/DL
GFR SERPL CREATININE-BSD FRML MDRD: 70 ML/MIN/1.73M2
GLUCOSE BLD-MCNC: 102 MG/DL (ref 70–99)
MICROALBUMIN UR-MCNC: 14 MG/L
MICROALBUMIN/CREAT UR: 13.86 MG/G CR (ref 0–25)
POTASSIUM BLD-SCNC: 4 MMOL/L (ref 3.4–5.3)
SODIUM SERPL-SCNC: 139 MMOL/L (ref 133–144)

## 2022-05-02 PROCEDURE — 82043 UR ALBUMIN QUANTITATIVE: CPT

## 2022-05-02 PROCEDURE — 36415 COLL VENOUS BLD VENIPUNCTURE: CPT

## 2022-05-02 PROCEDURE — 80048 BASIC METABOLIC PNL TOTAL CA: CPT

## 2022-05-09 ENCOUNTER — OFFICE VISIT (OUTPATIENT)
Dept: INTERNAL MEDICINE | Facility: CLINIC | Age: 72
End: 2022-05-09
Payer: MEDICARE

## 2022-05-09 VITALS
OXYGEN SATURATION: 95 % | HEIGHT: 63 IN | HEART RATE: 93 BPM | BODY MASS INDEX: 24.79 KG/M2 | SYSTOLIC BLOOD PRESSURE: 138 MMHG | RESPIRATION RATE: 185 BRPM | WEIGHT: 139.9 LBS | TEMPERATURE: 97.6 F | DIASTOLIC BLOOD PRESSURE: 75 MMHG

## 2022-05-09 DIAGNOSIS — I10 HTN, GOAL BELOW 140/90: ICD-10-CM

## 2022-05-09 PROCEDURE — 99213 OFFICE O/P EST LOW 20 MIN: CPT | Performed by: INTERNAL MEDICINE

## 2022-05-09 RX ORDER — LISINOPRIL 5 MG/1
5 TABLET ORAL DAILY
Qty: 90 TABLET | Refills: 0 | Status: SHIPPED | OUTPATIENT
Start: 2022-05-09 | End: 2022-06-10 | Stop reason: SINTOL

## 2022-05-09 NOTE — PATIENT INSTRUCTIONS
Plan:  1. Take Lisinopril 5 mg daily  2. Continue the other meds, same doses for now.  3. Follow up visit in office in a month ( 7:30 ok)

## 2022-05-09 NOTE — PROGRESS NOTES
Dr Theodore's note      Patient's instructions / PLAN:                                                        Plan:  1. Take Lisinopril 5 mg daily  2. Continue the other meds, same doses for now.  3. Follow up visit in office in a month ( 7:30 ok)      ASSESSMENT & PLAN:                                                      (I10) HTN, goal below 140/90  Comment: Not controlled   Plan: lisinopril (ZESTRIL) 5 MG tablet        as above        Chief complaint:                                                      HTN    SUBJECTIVE:                                                    History of present illness:    HTN  -- she didn't start the Lisinopril.  She waited for the pharmacy to call her that the prescription is ready and apparently she has not received a call.  -- Recent blood test in normal range    LOV: Plan:  1. Start Lisinopril for high blood pressure. Low salt diet helps   2. Please make a lab appointment for non fasting labs  In a month  3. Please make an appointment few days after the labs to discuss about the results.                   Hyperlipidemia Follow-Up      Are you regularly taking any medication or supplement to lower your cholesterol?   Yes- simvastatin    Are you having muscle aches or other side effects that you think could be caused by your cholesterol lowering medication?  No    Hypertension Follow-up      Do you check your blood pressure regularly outside of the clinic? No     Are you following a low salt diet? Yes    Are your blood pressures ever more than 140 on the top number (systolic) OR more   than 90 on the bottom number (diastolic), for example 140/90? Yes    BP Readings from Last 2 Encounters:   05/09/22 (!) 145/80   02/04/22 (!) 142/85     Hemoglobin A1C (%)   Date Value   03/08/2022 5.7 (H)     LDL Cholesterol Calculated (mg/dL)   Date Value   03/08/2022 106 (H)   11/13/2020 120 (H)   01/15/2018 111 (H)         How many servings of fruits and vegetables do you eat daily?  0-1    On  "average, how many sweetened beverages do you drink each day (Examples: soda, juice, sweet tea, etc.  Do NOT count diet or artificially sweetened beverages)?   0    How many days per week do you exercise enough to make your heart beat faster? 4    How many minutes a day do you exercise enough to make your heart beat faster? 30 - 60    How many days per week do you miss taking your medication? 0        Review of Systems:                                                      ROS: negative for fever, chills, cough, wheezes, chest pain, shortness of breath, vomiting, abdominal pain, leg swelling      OBJECTIVE:             Physical exam:   Blood pressure 138/75, pulse 93, temperature 97.6  F (36.4  C), temperature source Tympanic, resp. rate (!) 185, height 1.6 m (5' 3\"), weight 63.5 kg (139 lb 14.4 oz), SpO2 95 %, not currently breastfeeding.     NAD, appears comfortable      PMHx: reviewed  Past Medical History:   Diagnosis Date     Osteoporosis       PSHx: reviewed  Past Surgical History:   Procedure Laterality Date     BLADDER SURGERY  2009    sling      COLONOSCOPY  2005     COLONOSCOPY  9/17/2015    Dr. Mccarthy [facility]     COLONOSCOPY N/A 9/17/2015    Procedure: COLONOSCOPY;  Surgeon: Ted Mccarthy MD;  Location:  GI     GYN SURGERY       HYSTERECTOMY, PAP NO LONGER INDICATED      ovaries are present     KNEE SURGERY       ORTHOPEDIC SURGERY       Z APPENDECTOMY          Meds: reviewed  Current Outpatient Medications   Medication Sig Dispense Refill     cyclobenzaprine (FLEXERIL) 5 MG tablet        IBANdronate (BONIVA) 150 MG tablet TAKE 1 TABLET EVERY 30 DAYS 3 tablet 3     ibuprofen (ADVIL/MOTRIN) 600 MG tablet Take 1 tablet (600 mg) by mouth every 8 hours as needed for moderate pain 60 tablet 0     lisinopril (ZESTRIL) 5 MG tablet Take 1 tablet (5 mg) by mouth in the morning. 30 tablet 3     MULTIVITAMINS OR TABS 1 tablet daily       omega 3 1000 MG CAPS Take 1 g by mouth daily 90 capsule      simvastatin " (ZOCOR) 40 MG tablet Take 1 tablet (40 mg) by mouth At Bedtime 90 tablet 3     VITAMIN C 500 MG OR TABS 1 TABLET TWICE DAILY         Soc Hx: reviewed  Fam Hx: reviewed          Gabriela Theodore MD  Internal Medicine

## 2022-06-10 ENCOUNTER — OFFICE VISIT (OUTPATIENT)
Dept: INTERNAL MEDICINE | Facility: CLINIC | Age: 72
End: 2022-06-10
Payer: MEDICARE

## 2022-06-10 VITALS
BODY MASS INDEX: 24.43 KG/M2 | TEMPERATURE: 98.1 F | WEIGHT: 137.9 LBS | RESPIRATION RATE: 18 BRPM | SYSTOLIC BLOOD PRESSURE: 132 MMHG | OXYGEN SATURATION: 99 % | HEIGHT: 63 IN | DIASTOLIC BLOOD PRESSURE: 74 MMHG | HEART RATE: 78 BPM

## 2022-06-10 DIAGNOSIS — I10 HTN, GOAL BELOW 140/90: ICD-10-CM

## 2022-06-10 DIAGNOSIS — H61.22 IMPACTED CERUMEN OF LEFT EAR: Primary | ICD-10-CM

## 2022-06-10 DIAGNOSIS — H91.92 DECREASED HEARING OF LEFT EAR: ICD-10-CM

## 2022-06-10 PROCEDURE — 99213 OFFICE O/P EST LOW 20 MIN: CPT | Mod: 25 | Performed by: INTERNAL MEDICINE

## 2022-06-10 PROCEDURE — 69210 REMOVE IMPACTED EAR WAX UNI: CPT | Mod: LT | Performed by: INTERNAL MEDICINE

## 2022-06-10 RX ORDER — LOSARTAN POTASSIUM 25 MG/1
25 TABLET ORAL DAILY
Qty: 90 TABLET | Refills: 3 | Status: SHIPPED | OUTPATIENT
Start: 2022-06-10 | End: 2023-03-22

## 2022-06-10 NOTE — PATIENT INSTRUCTIONS
Plan:  1. Stop Lisinopril  2. Start Losartan for the blood pressure   3. Continue the other meds, same doses for now.  4. Follow up after March 8, 2023 - for ANNUAL EXAM  5. Ear drops once a week to prevent wax building

## 2022-06-10 NOTE — PROGRESS NOTES
Dr Theodore's note      Patient's instructions / PLAN:                                                        Plan:  1. Stop Lisinopril  2. Start Losartan for the blood pressure   3. Continue the other meds, same doses for now.  4. Follow up after March 8, 2023 - for ANNUAL EXAM  5. Ear drops once a week to prevent wax building        ASSESSMENT & PLAN:                                                      (I10) HTN, goal below 140/90  Comment: Controlled  But cough w lisinopril  We discussed about the new meds, advantages and potential side effects. The patient will read also the info from the pharmacy and call back if questions.   Plan: losartan (COZAAR) 25 MG tablet             (H61.22) Impacted cerumen of left ear  (primary encounter diagnosis)  Comment:   Plan: REMOVE IMPACTED CERUMEN            (H91.92) Decreased hearing of left ear  Comment: improved after removing wax   Plan: as above        Chief complaint:                                                      L ear decreased hearing  HTN  cough    SUBJECTIVE:                                                    History of present illness:    freq dry cough since Lisinopril    Decreased hearing L year    The auditory external canal on the L has impacted wax.  The auditory external canals have impacted wax bilaterally  The patient gave me verbal consent and I remove part of it with a curet and the rest I  removed by washing.  No complications     LOV: Plan:  1. Take Lisinopril 5 mg daily  2. Continue the other meds, same doses for now.  3. Follow up visit in office in a month ( 7:30 ok)          Hyperlipidemia Follow-Up      Are you regularly taking any medication or supplement to lower your cholesterol?   Yes- Simvastatin    Are you having muscle aches or other side effects that you think could be caused by your cholesterol lowering medication?  No    Hypertension Follow-up      Do you check your blood pressure regularly outside of the clinic? Yes     Are you  "following a low salt diet? Yes    Are your blood pressures ever more than 140 on the top number (systolic) OR more   than 90 on the bottom number (diastolic), for example 140/90? No      How many servings of fruits and vegetables do you eat daily?  0-1    On average, how many sweetened beverages do you drink each day (Examples: soda, juice, sweet tea, etc.  Do NOT count diet or artificially sweetened beverages)?   0    How many days per week do you exercise enough to make your heart beat faster? 4    How many minutes a day do you exercise enough to make your heart beat faster? 20 - 29    How many days per week do you miss taking your medication? 0      Review of Systems:                                                      ROS: negative for fever, chills,  wheezes, chest pain, shortness of breath, vomiting, abdominal pain, leg swelling Pos for cough      OBJECTIVE:             Physical exam:  Blood pressure 132/74, pulse 78, temperature 98.1  F (36.7  C), temperature source Tympanic, resp. rate 18, height 1.6 m (5' 3\"), weight 62.6 kg (137 lb 14.4 oz), SpO2 99 %, not currently breastfeeding.     NAD, appears comfortable  Skin: no rashes   HEENT:  bilateral tympanic membranes are clinically normal after removing the wax ,   Neck: supple, no JVD,  No thyroidmegaly. Lymph nodes nonpalpable cervical and     PMHx: reviewed  Past Medical History:   Diagnosis Date     Osteoporosis       PSHx: reviewed  Past Surgical History:   Procedure Laterality Date     BLADDER SURGERY  2009    sling      COLONOSCOPY  2005     COLONOSCOPY  9/17/2015    Dr. Mccarthy [facility]     COLONOSCOPY N/A 9/17/2015    Procedure: COLONOSCOPY;  Surgeon: Ted Mccarthy MD;  Location:  GI     GYN SURGERY       HYSTERECTOMY, PAP NO LONGER INDICATED      ovaries are present     KNEE SURGERY       ORTHOPEDIC SURGERY       Z APPENDECTOMY          Meds: reviewed  Current Outpatient Medications   Medication Sig Dispense Refill     cyclobenzaprine " (FLEXERIL) 5 MG tablet        IBANdronate (BONIVA) 150 MG tablet TAKE 1 TABLET EVERY 30 DAYS 3 tablet 3     ibuprofen (ADVIL/MOTRIN) 600 MG tablet Take 1 tablet (600 mg) by mouth every 8 hours as needed for moderate pain 60 tablet 0     lisinopril (ZESTRIL) 5 MG tablet Take 1 tablet (5 mg) by mouth daily 90 tablet 0     MULTIVITAMINS OR TABS 1 tablet daily       omega 3 1000 MG CAPS Take 1 g by mouth daily 90 capsule      simvastatin (ZOCOR) 40 MG tablet Take 1 tablet (40 mg) by mouth At Bedtime 90 tablet 3     VITAMIN C 500 MG OR TABS 1 TABLET TWICE DAILY         Soc Hx: reviewed  Fam Hx: reviewed          Gabriela Theodore MD  Internal Medicine

## 2022-10-22 ENCOUNTER — HEALTH MAINTENANCE LETTER (OUTPATIENT)
Age: 72
End: 2022-10-22

## 2022-12-14 DIAGNOSIS — M81.0 OSTEOPOROSIS, UNSPECIFIED OSTEOPOROSIS TYPE, UNSPECIFIED PATHOLOGICAL FRACTURE PRESENCE: ICD-10-CM

## 2022-12-16 RX ORDER — IBANDRONATE SODIUM 150 MG/1
TABLET, FILM COATED ORAL
Qty: 3 TABLET | Refills: 3 | Status: SHIPPED | OUTPATIENT
Start: 2022-12-16 | End: 2023-10-03

## 2022-12-16 NOTE — TELEPHONE ENCOUNTER
Routing refill request to provider for review/approval because:  Labs not current:  nasir HUBBARD RN, BSN

## 2023-01-08 DIAGNOSIS — Z00.00 ROUTINE GENERAL MEDICAL EXAMINATION AT A HEALTH CARE FACILITY: ICD-10-CM

## 2023-01-08 DIAGNOSIS — E78.5 HYPERLIPIDEMIA LDL GOAL <130: ICD-10-CM

## 2023-01-09 ENCOUNTER — TELEPHONE (OUTPATIENT)
Dept: INTERNAL MEDICINE | Facility: CLINIC | Age: 73
End: 2023-01-09

## 2023-01-09 RX ORDER — SIMVASTATIN 40 MG
TABLET ORAL
Qty: 90 TABLET | Refills: 0 | Status: SHIPPED | OUTPATIENT
Start: 2023-01-09 | End: 2023-03-22

## 2023-01-09 NOTE — TELEPHONE ENCOUNTER
Patient Quality Outreach    Patient is due for the following:   Physical Annual Wellness Visit    Next Steps:   Schedule a Annual Wellness Visit    Type of outreach:    Phone, spoke to patient/parent. will call and schedule appointment.    Next Steps:  Reach out within 90 days via Phone.    Max number of attempts reached: No. Will try again in 90 days if patient still on fail list.    Questions for provider review:    None     Shireen Matt

## 2023-02-04 ENCOUNTER — HOSPITAL ENCOUNTER (EMERGENCY)
Facility: CLINIC | Age: 73
Discharge: HOME | End: 2023-02-04
Attending: EMERGENCY MEDICINE | Admitting: EMERGENCY MEDICINE
Payer: MEDICARE

## 2023-02-04 VITALS
SYSTOLIC BLOOD PRESSURE: 142 MMHG | RESPIRATION RATE: 18 BRPM | OXYGEN SATURATION: 99 % | HEART RATE: 77 BPM | BODY MASS INDEX: 25.15 KG/M2 | TEMPERATURE: 97.5 F | DIASTOLIC BLOOD PRESSURE: 84 MMHG | WEIGHT: 142 LBS

## 2023-02-04 DIAGNOSIS — R31.9 URINARY TRACT INFECTION WITH HEMATURIA, SITE UNSPECIFIED: Primary | ICD-10-CM

## 2023-02-04 DIAGNOSIS — N39.0 URINARY TRACT INFECTION WITH HEMATURIA, SITE UNSPECIFIED: Primary | ICD-10-CM

## 2023-02-04 LAB
ALBUMIN UR-MCNC: 100 MG/DL
ANION GAP SERPL CALCULATED.3IONS-SCNC: 9 MMOL/L (ref 7–15)
APPEARANCE UR: ABNORMAL
BACTERIA #/AREA URNS HPF: ABNORMAL /HPF
BASOPHILS # BLD AUTO: 0 10E3/UL (ref 0–0.2)
BASOPHILS NFR BLD AUTO: 0 %
BILIRUB UR QL STRIP: NEGATIVE
BUN SERPL-MCNC: 12.2 MG/DL (ref 8–23)
CALCIUM SERPL-MCNC: 9.8 MG/DL (ref 8.8–10.2)
CHLORIDE SERPL-SCNC: 105 MMOL/L (ref 98–107)
COLOR UR AUTO: ABNORMAL
CREAT SERPL-MCNC: 0.83 MG/DL (ref 0.51–0.95)
DEPRECATED HCO3 PLAS-SCNC: 26 MMOL/L (ref 22–29)
EOSINOPHIL # BLD AUTO: 0 10E3/UL (ref 0–0.7)
EOSINOPHIL NFR BLD AUTO: 0 %
ERYTHROCYTE [DISTWIDTH] IN BLOOD BY AUTOMATED COUNT: 11.9 % (ref 10–15)
GFR SERPL CREATININE-BSD FRML MDRD: 75 ML/MIN/1.73M2
GLUCOSE SERPL-MCNC: 101 MG/DL (ref 70–99)
GLUCOSE UR STRIP-MCNC: NEGATIVE MG/DL
HCT VFR BLD AUTO: 41.5 % (ref 35–47)
HGB BLD-MCNC: 13.8 G/DL (ref 11.7–15.7)
HGB UR QL STRIP: ABNORMAL
IMM GRANULOCYTES # BLD: 0 10E3/UL
IMM GRANULOCYTES NFR BLD: 0 %
KETONES UR STRIP-MCNC: NEGATIVE MG/DL
LEUKOCYTE ESTERASE UR QL STRIP: ABNORMAL
LYMPHOCYTES # BLD AUTO: 1.2 10E3/UL (ref 0.8–5.3)
LYMPHOCYTES NFR BLD AUTO: 11 %
MCH RBC QN AUTO: 31.2 PG (ref 26.5–33)
MCHC RBC AUTO-ENTMCNC: 33.3 G/DL (ref 31.5–36.5)
MCV RBC AUTO: 94 FL (ref 78–100)
MONOCYTES # BLD AUTO: 0.6 10E3/UL (ref 0–1.3)
MONOCYTES NFR BLD AUTO: 6 %
MUCOUS THREADS #/AREA URNS LPF: PRESENT /LPF
NEUTROPHILS # BLD AUTO: 8.9 10E3/UL (ref 1.6–8.3)
NEUTROPHILS NFR BLD AUTO: 83 %
NITRATE UR QL: NEGATIVE
NRBC # BLD AUTO: 0 10E3/UL
NRBC BLD AUTO-RTO: 0 /100
PH UR STRIP: 5.5 [PH] (ref 5–7)
PLATELET # BLD AUTO: 213 10E3/UL (ref 150–450)
POTASSIUM SERPL-SCNC: 4.7 MMOL/L (ref 3.4–5.3)
RBC # BLD AUTO: 4.43 10E6/UL (ref 3.8–5.2)
RBC URINE: >182 /HPF
SODIUM SERPL-SCNC: 140 MMOL/L (ref 136–145)
SP GR UR STRIP: 1.02 (ref 1–1.03)
SQUAMOUS EPITHELIAL: 3 /HPF
UROBILINOGEN UR STRIP-MCNC: NORMAL MG/DL
WBC # BLD AUTO: 10.8 10E3/UL (ref 4–11)
WBC CLUMPS #/AREA URNS HPF: PRESENT /HPF
WBC URINE: >182 /HPF

## 2023-02-04 PROCEDURE — 81001 URINALYSIS AUTO W/SCOPE: CPT | Performed by: EMERGENCY MEDICINE

## 2023-02-04 PROCEDURE — 36415 COLL VENOUS BLD VENIPUNCTURE: CPT | Performed by: EMERGENCY MEDICINE

## 2023-02-04 PROCEDURE — 80048 BASIC METABOLIC PNL TOTAL CA: CPT | Performed by: EMERGENCY MEDICINE

## 2023-02-04 PROCEDURE — 99284 EMERGENCY DEPT VISIT MOD MDM: CPT

## 2023-02-04 PROCEDURE — 51798 US URINE CAPACITY MEASURE: CPT

## 2023-02-04 PROCEDURE — 85025 COMPLETE CBC W/AUTO DIFF WBC: CPT | Performed by: EMERGENCY MEDICINE

## 2023-02-04 PROCEDURE — 87086 URINE CULTURE/COLONY COUNT: CPT | Performed by: EMERGENCY MEDICINE

## 2023-02-04 RX ORDER — CEPHALEXIN 500 MG/1
500 CAPSULE ORAL 2 TIMES DAILY
Qty: 10 CAPSULE | Refills: 0 | Status: SHIPPED | OUTPATIENT
Start: 2023-02-04 | End: 2023-02-09

## 2023-02-04 ASSESSMENT — ENCOUNTER SYMPTOMS
FREQUENCY: 1
BACK PAIN: 0
DYSURIA: 1
FLANK PAIN: 0
CHILLS: 0
FEVER: 0
HEMATURIA: 1

## 2023-02-04 ASSESSMENT — ACTIVITIES OF DAILY LIVING (ADL): ADLS_ACUITY_SCORE: 35

## 2023-02-04 NOTE — ED PROVIDER NOTES
ED ATTENDING PHYSICIAN NOTE:  I evaluated this patient in conjunction with Jasmyn Reese, Resident Physician. I have participated in the care of the patient and personally performed key elements of the history, exam, and medical decision making.    HPI:  Briefly, Terri Ratliff is a 72 year old female presents with dysuria and hematuria.  Distant history of similar symptoms which patient states might be secondary to UTI.  No history of renal stones.  No history of renal cancer or bladder cancer.  No difficulty with urination.  Patient endorses the urine as pink.  No fever or chills.  No abdominal pain.  No flank pain.    EXAM:  Constitutional:       General: Not in acute distress.        Appearance: Normal appearance.   HENT:      Head: Normocephalic and atraumatic.   Eyes:      Extraocular Movements: Extraocular movements intact.      Conjunctiva/sclera: Conjunctivae normal.   Cardiovascular:      Rate and Rhythm: Normal rate and regular rhythm.   Pulmonary:      Effort: Pulmonary effort is normal. No respiratory distress.      Breath sounds: Normal breath sounds.   Abdominal:      General: Abdomen is flat. There is no distension.      Palpations: Abdomen is soft.      Tenderness: There is no abdominal tenderness.  No CVA tenderness.  Musculoskeletal:      Cervical back: Normal range of motion. No rigidity.      Right lower leg: No edema.      Left lower leg: No edema.   Skin:     General: Skin is warm and dry.   Neurological:      General: No focal deficit present.      Mental Status: Alert and oriented to person, place, and time.   Psychiatric:         Mood and Affect: Mood normal.         Behavior: Behavior normal.    MEDICAL DECISION MAKING/ASSESSMENT AND PLAN:   72-year-old female as described above presents to the emergency department with hematuria and dysuria.  Patient hemodynamically stable at time evaluation.  Afebrile.  Order bladder scan and postvoid residual for evaluation for urinary retention.   Urine analysis for evaluation for UTI.  Patient's symptoms likely secondary to urinary tract infection.  Nonetheless, will obtain basic blood work and chemistries for evaluation for RONALD and significant leukocytosis.  Patient does not appear septic at this time.  Patient otherwise appears well.  If work-up significant for isolated UTI, will discharge patient with antibiotics and follow-up outpatient.  Discussed care plan with patient who voiced understanding and agreement with plan.  Answered all questions.  Additional work-up and orders as listed in chart.    Please refer to ED course below for details on the patient's treatment course and any changes or updates in care plan beyond my initial evaluation and MDM.    ED Course as of 02/04/23 1547   Sat Feb 04, 2023   1149 I obtained history and examined the patient as noted above.    1320 WBC Urine(!): >182   1321 RBC Urine(!): >182   1321 Leukocyte Esterase Urine(!): Large   1400 Patient updated on lab findings.  Bladder scan 48 mL with no difficulty with urinary void.  Will discharge patient with Keflex.  Discussed return precautions.  Answered all questions.  Patient voiced understanding and agreement with plan.         DIAGNOSIS:    ICD-10-CM    1. Urinary tract infection with hematuria, site unspecified  N39.0     R31.9           DISPOSITION:  Discharged home    2/4/2023  [facility] EMERGENCY DEPT     Cayetano Lopez DO  02/04/23 1547

## 2023-02-04 NOTE — DISCHARGE INSTRUCTIONS
Please follow-up with your primary care provider and/or specialist regarding your visit to the ER today.    Please return to the emergency department should you experience any of the symptoms we specifically discussed, including but not limited to recurrence or worsening of your symptoms, or development of any new and concerning symptoms such as inability urinate, fevers, pain in your sides, or worsening pain.

## 2023-02-04 NOTE — ED TRIAGE NOTES
Pt presents for evaluation of urinary frequency and burning with urination starting this morning. Denies any other symptoms.

## 2023-02-04 NOTE — ED PROVIDER NOTES
History     Chief Complaint:  Urinary Frequency       HPI   Terri Ratliff is a 72 year old female with a history of hyperlipidemia who presents with urinary frequency. The patient reports an onset of dysuria and hematuria this morning. She has also had an increase in urinary frequency, and feels that it is difficult to empty her bladder. She has a history of these symptoms, but chart review shows that her urine cultures have been negative. No back pain, fever, chills, abdominal pain, vaginal pain or discharge. No recent antibiotics. She has an appointment with her PCP next month.     Independent Historian:   None - Patient Only    Review of External Notes:   6/10/22 office visit note     ROS:  Review of Systems   Constitutional: Negative for chills and fever.   Genitourinary: Positive for dysuria, frequency and hematuria. Negative for flank pain, vaginal discharge and vaginal pain.   Musculoskeletal: Negative for back pain.   All other systems reviewed and are negative.      Allergies:  Lisinopril     Medications:    Boniva  Losartan  Simvastatin  Cyclobenzaprine    Past Medical History:    Hyperlipidemia  Osteoporosis   Impaired fasting glucose    Past Surgical History:    Appendectomy  Bladder sling  Hysterectomy  Lumbar disc surgery  Knee surgery     Family History:    Mother- diabetes mellitus  Father- alcohol abuse  Brother- leukemia  Sister- hyperlipidemia, hypertension     Social History:  The patient presents via private vehicle   at bedside  No tobacco or other drug use  Occasional alcohol use    Physical Exam     Patient Vitals for the past 24 hrs:   BP Temp Temp src Pulse Resp SpO2 Weight   02/04/23 1400 (!) 142/84 -- -- 77 18 99 % --   02/04/23 1125 (!) 153/82 97.5  F (36.4  C) Oral 75 18 98 % 64.4 kg (142 lb)        Physical Exam  Constitutional:       General: She is not in acute distress.     Appearance: Normal appearance. She is not diaphoretic.   HENT:      Head: Normocephalic and  atraumatic.      Mouth/Throat:      Mouth: Mucous membranes are moist.      Pharynx: Oropharynx is clear. No oropharyngeal exudate.   Eyes:      General: No scleral icterus.     Extraocular Movements: Extraocular movements intact.      Conjunctiva/sclera: Conjunctivae normal.      Pupils: Pupils are equal, round, and reactive to light.   Cardiovascular:      Rate and Rhythm: Normal rate and regular rhythm.      Pulses: Normal pulses.      Heart sounds: Normal heart sounds.   Pulmonary:      Effort: Pulmonary effort is normal. No respiratory distress.      Breath sounds: Normal breath sounds.   Abdominal:      General: Abdomen is flat. Bowel sounds are normal. There is no distension.      Palpations: Abdomen is soft.      Tenderness: There is no abdominal tenderness.   Musculoskeletal:         General: No tenderness.   Skin:     General: Skin is warm.      Findings: No rash.   Neurological:      General: No focal deficit present.      Mental Status: She is alert and oriented to person, place, and time.   Psychiatric:         Mood and Affect: Mood normal.         Behavior: Behavior normal.         Thought Content: Thought content normal.         Judgment: Judgment normal.       Emergency Department Course     Laboratory:  Labs Ordered and Resulted from Time of ED Arrival to Time of ED Departure   ROUTINE UA WITH MICROSCOPIC REFLEX TO CULTURE - Abnormal       Result Value    Color Urine Orange (*)     Appearance Urine Cloudy (*)     Glucose Urine Negative      Bilirubin Urine Negative      Ketones Urine Negative      Specific Gravity Urine 1.018      Blood Urine Large (*)     pH Urine 5.5      Protein Albumin Urine 100 (*)     Urobilinogen Urine Normal      Nitrite Urine Negative      Leukocyte Esterase Urine Large (*)     Bacteria Urine Few (*)     WBC Clumps Urine Present (*)     Mucus Urine Present (*)     RBC Urine >182 (*)     WBC Urine >182 (*)     Squamous Epithelials Urine 3 (*)    BASIC METABOLIC PANEL -  Abnormal    Sodium 140      Potassium 4.7      Chloride 105      Carbon Dioxide (CO2) 26      Anion Gap 9      Urea Nitrogen 12.2      Creatinine 0.83      Calcium 9.8      Glucose 101 (*)     GFR Estimate 75     CBC WITH PLATELETS AND DIFFERENTIAL - Abnormal    WBC Count 10.8      RBC Count 4.43      Hemoglobin 13.8      Hematocrit 41.5      MCV 94      MCH 31.2      MCHC 33.3      RDW 11.9      Platelet Count 213      % Neutrophils 83      % Lymphocytes 11      % Monocytes 6      % Eosinophils 0      % Basophils 0      % Immature Granulocytes 0      NRBCs per 100 WBC 0      Absolute Neutrophils 8.9 (*)     Absolute Lymphocytes 1.2      Absolute Monocytes 0.6      Absolute Eosinophils 0.0      Absolute Basophils 0.0      Absolute Immature Granulocytes 0.0      Absolute NRBCs 0.0     URINE CULTURE      Emergency Department Course & Assessments:  Assessments/Consultations/Discussion of Management or Tests  ED Course as of 02/04/23 1427   Sat Feb 04, 2023   1149 I obtained history and examined the patient as noted above.    1320 WBC Urine(!): >182   1321 RBC Urine(!): >182   1321 Leukocyte Esterase Urine(!): Large   1400 Patient updated     Interventions:  Medications - No data to display     Disposition:  The patient was discharged to home.     Impression & Plan    CMS Diagnoses: None    Medical Decision Making:  Patient presenting with urinary symptoms of blood in urine and pain with urination. Reassuringly patient without systemic symptoms including no fever, abdominal pain, back or flank pain, N/V or diarrhea. No CVA tenderness, fever, or N/V, reassuring against pyelonephritis, appendicitis, colitis, diverticulitis or intraabdominal process. Concern for obstruction given hemeturia, post-urination bladder scan normal. Urine is consistent with infection and hematuria. Patient discharged with keflex, urine culture pending.    Diagnosis:    ICD-10-CM    1. Urinary tract infection with hematuria, site unspecified   N39.0     R31.9          Discharge Medications:  Discharge Medication List as of 2/4/2023  1:51 PM        START taking these medications    Details   cephALEXin (KEFLEX) 500 MG capsule Take 1 capsule (500 mg) by mouth 2 times daily for 5 days, Disp-10 capsule, R-0, E-Prescribe            Scribe Disclosure:  I, Zakia Velasquez, am serving as a scribe at 11:42 AM on 2/4/2023 to document services personally performed by Jasmyn Reese MD based on my observations and the provider's statements to me.      2/4/2023   Jasmyn Reese MD Krumholz, Julia, MD  02/04/23 1429       Cayetano Lopez DO  02/04/23 0549

## 2023-02-06 ENCOUNTER — TELEPHONE (OUTPATIENT)
Dept: EMERGENCY MEDICINE | Facility: CLINIC | Age: 73
End: 2023-02-06
Payer: MEDICARE

## 2023-02-06 LAB — BACTERIA UR CULT: NO GROWTH

## 2023-02-06 NOTE — TELEPHONE ENCOUNTER
"[facility] Emergency Department/Urgent Care Lab result notification:    [place] ED lab result protocol used  Urine culture    Reason for call  Notify of lab results, assess symptoms,  review ED providers recommendations/discharge instructions (if necessary) and advise per ED lab result f/u protocol    Lab Result   Final urine culture report shows \"NO GROWTH\" and is NEGATIVE.  [facility] Emergency Dept discharge antibiotic: Cephalexin (Keflex) 500 mg capsule, 1 capsule (500 mg) by mouth 2 times daily for 5 days.  [facility] Emergency Dept discharge Rx antibiotic for UTI only (Yes/No): Yes  RN to confirm if Patient took antibiotic within 3 days prior to urine culture collection.  Recommendations in treatment per [facility] ED Lab result Urine culture protocol.  Information table from Emergency Dept Provider visit on 2/4/23  Symptoms reported at ED visit (Chief complaint, HPI) Urinary Frequency        HPI   Terri Ratliff is a 72 year old female with a history of hyperlipidemia who presents with urinary frequency. The patient reports an onset of dysuria and hematuria this morning. She has also had an increase in urinary frequency, and feels that it is difficult to empty her bladder. She has a history of these symptoms, but chart review shows that her urine cultures have been negative. No back pain, fever, chills, abdominal pain, vaginal pain or discharge. No recent antibiotics. She has an appointment with her PCP next month.    ED providers Impression and Plan (applicable information) Patient presenting with urinary symptoms of blood in urine and pain with urination. Reassuringly patient without systemic symptoms including no fever, abdominal pain, back or flank pain, N/V or diarrhea. No CVA tenderness, fever, or N/V, reassuring against pyelonephritis, appendicitis, colitis, diverticulitis or intraabdominal process. Concern for obstruction given hemeturia, post-urination bladder scan normal. Urine " "is consistent with infection and hematuria. Patient discharged with keflex, urine culture pending.   Miscellaneous information na     RN Assessment (Patient s current Symptoms), include time called.  [Insert Left message here if message left]  12:58PM: Patient states she is \"fine\" today.   No further blood in the urine.  No urinary symptoms, no frequency or pain with urination.   Denies any difficulty with emptying her bladder.     RN Recommendations/Instructions per [place] ED lab result protocol  Patient notified of lab result and treatment recommendations.     RN reviewed information about the urine culture.  Verified that the patient had not been on any antibiotics prior to being seen in the ED. Advised per ED lab urine culture protocol that she can stop the antibiotic as her urine is showing no growth.   Advised to follow up with the PCP as directed, sooner if symptoms return.   The patient is comfortable with the information given and has no further questions.     Please Contact your PCP clinic or return to the Emergency department if your:    Symptoms return.    Symptoms worsen or other concerning symptom's.    PCP follow-up Questions asked: YES       Joselyn Shore RN  [facility]  Emergency Dept Lab Result RN  Ph# [phone]     Copy of Lab result   Urine Culture  Order: [id number]   Collected 2/4/2023 11:29 AM      Status: Final result      Visible to patient: Yes (not seen)     Specimen Information: Urine, Midstream    2 Result Notes  Culture No Growth            Resulting Agency: [facility]           Specimen Collected: 02/04/23 11:29 AM Last Resulted: 02/06/23  5:23 AM                 "

## 2023-03-21 ENCOUNTER — TELEPHONE (OUTPATIENT)
Dept: INTERNAL MEDICINE | Facility: CLINIC | Age: 73
End: 2023-03-21
Payer: MEDICARE

## 2023-03-21 NOTE — TELEPHONE ENCOUNTER
Patient Quality Outreach    Patient is due for the following:   Depression  -  PHQ-9 needed, PHQ-A needed and Depression follow-up visit    Next Steps:   Schedule a office visit for depression follow up.    Type of outreach:    Phone, left message for patient/parent to call back.    Next Steps:  Reach out within 90 days via [employer].    Max number of attempts reached: No. Will try again in 90 days if patient still on fail list.    Questions for provider review:    None     Shireen Matt

## 2023-03-22 ENCOUNTER — OFFICE VISIT (OUTPATIENT)
Dept: INTERNAL MEDICINE | Facility: CLINIC | Age: 73
End: 2023-03-22
Payer: MEDICARE

## 2023-03-22 VITALS
DIASTOLIC BLOOD PRESSURE: 70 MMHG | SYSTOLIC BLOOD PRESSURE: 116 MMHG | HEIGHT: 63 IN | BODY MASS INDEX: 25.36 KG/M2 | RESPIRATION RATE: 18 BRPM | OXYGEN SATURATION: 100 % | HEART RATE: 71 BPM | TEMPERATURE: 97.7 F | WEIGHT: 143.1 LBS

## 2023-03-22 DIAGNOSIS — I10 HTN, GOAL BELOW 140/90: ICD-10-CM

## 2023-03-22 DIAGNOSIS — E78.5 HYPERLIPIDEMIA LDL GOAL <130: ICD-10-CM

## 2023-03-22 DIAGNOSIS — R73.9 BLOOD SUGAR INCREASED: ICD-10-CM

## 2023-03-22 DIAGNOSIS — Z12.31 ENCOUNTER FOR SCREENING MAMMOGRAM FOR BREAST CANCER: ICD-10-CM

## 2023-03-22 DIAGNOSIS — Z00.00 ROUTINE GENERAL MEDICAL EXAMINATION AT A HEALTH CARE FACILITY: Primary | ICD-10-CM

## 2023-03-22 DIAGNOSIS — Z12.31 VISIT FOR SCREENING MAMMOGRAM: ICD-10-CM

## 2023-03-22 LAB
ALBUMIN SERPL BCG-MCNC: 4.6 G/DL (ref 3.5–5.2)
ALP SERPL-CCNC: 66 U/L (ref 35–104)
ALT SERPL W P-5'-P-CCNC: 20 U/L (ref 10–35)
ANION GAP SERPL CALCULATED.3IONS-SCNC: 12 MMOL/L (ref 7–15)
AST SERPL W P-5'-P-CCNC: 26 U/L (ref 10–35)
BILIRUB SERPL-MCNC: 0.4 MG/DL
BUN SERPL-MCNC: 15 MG/DL (ref 8–23)
CALCIUM SERPL-MCNC: 9.5 MG/DL (ref 8.8–10.2)
CHLORIDE SERPL-SCNC: 109 MMOL/L (ref 98–107)
CHOLEST SERPL-MCNC: 199 MG/DL
CK SERPL-CCNC: 54 U/L (ref 26–192)
CREAT SERPL-MCNC: 0.83 MG/DL (ref 0.51–0.95)
CREAT UR-MCNC: 131 MG/DL
DEPRECATED HCO3 PLAS-SCNC: 24 MMOL/L (ref 22–29)
ERYTHROCYTE [DISTWIDTH] IN BLOOD BY AUTOMATED COUNT: 11.7 % (ref 10–15)
GFR SERPL CREATININE-BSD FRML MDRD: 75 ML/MIN/1.73M2
GLUCOSE SERPL-MCNC: 97 MG/DL (ref 70–99)
HBA1C MFR BLD: 5.6 % (ref 0–5.6)
HCT VFR BLD AUTO: 41.7 % (ref 35–47)
HDLC SERPL-MCNC: 52 MG/DL
HGB BLD-MCNC: 14 G/DL (ref 11.7–15.7)
LDLC SERPL CALC-MCNC: 108 MG/DL
MCH RBC QN AUTO: 30.6 PG (ref 26.5–33)
MCHC RBC AUTO-ENTMCNC: 33.6 G/DL (ref 31.5–36.5)
MCV RBC AUTO: 91 FL (ref 78–100)
MICROALBUMIN UR-MCNC: 15.1 MG/L
MICROALBUMIN/CREAT UR: 11.53 MG/G CR (ref 0–25)
NONHDLC SERPL-MCNC: 147 MG/DL
PLATELET # BLD AUTO: 240 10E3/UL (ref 150–450)
POTASSIUM SERPL-SCNC: 4.6 MMOL/L (ref 3.4–5.3)
PROT SERPL-MCNC: 7.5 G/DL (ref 6.4–8.3)
RBC # BLD AUTO: 4.57 10E6/UL (ref 3.8–5.2)
SODIUM SERPL-SCNC: 145 MMOL/L (ref 136–145)
TRIGL SERPL-MCNC: 197 MG/DL
TSH SERPL DL<=0.005 MIU/L-ACNC: 3.62 UIU/ML (ref 0.3–4.2)
WBC # BLD AUTO: 4.7 10E3/UL (ref 4–11)

## 2023-03-22 PROCEDURE — G0439 PPPS, SUBSEQ VISIT: HCPCS | Performed by: INTERNAL MEDICINE

## 2023-03-22 PROCEDURE — 85027 COMPLETE CBC AUTOMATED: CPT | Performed by: INTERNAL MEDICINE

## 2023-03-22 PROCEDURE — 80061 LIPID PANEL: CPT | Performed by: INTERNAL MEDICINE

## 2023-03-22 PROCEDURE — 80053 COMPREHEN METABOLIC PANEL: CPT | Performed by: INTERNAL MEDICINE

## 2023-03-22 PROCEDURE — 99214 OFFICE O/P EST MOD 30 MIN: CPT | Mod: 25 | Performed by: INTERNAL MEDICINE

## 2023-03-22 PROCEDURE — 36415 COLL VENOUS BLD VENIPUNCTURE: CPT | Performed by: INTERNAL MEDICINE

## 2023-03-22 PROCEDURE — 82043 UR ALBUMIN QUANTITATIVE: CPT | Performed by: INTERNAL MEDICINE

## 2023-03-22 PROCEDURE — 82570 ASSAY OF URINE CREATININE: CPT | Performed by: INTERNAL MEDICINE

## 2023-03-22 PROCEDURE — 84443 ASSAY THYROID STIM HORMONE: CPT | Performed by: INTERNAL MEDICINE

## 2023-03-22 PROCEDURE — 82550 ASSAY OF CK (CPK): CPT | Performed by: INTERNAL MEDICINE

## 2023-03-22 PROCEDURE — 83036 HEMOGLOBIN GLYCOSYLATED A1C: CPT | Performed by: INTERNAL MEDICINE

## 2023-03-22 RX ORDER — LOSARTAN POTASSIUM 25 MG/1
25 TABLET ORAL DAILY
Qty: 90 TABLET | Refills: 4 | Status: SHIPPED | OUTPATIENT
Start: 2023-03-22 | End: 2023-06-07

## 2023-03-22 RX ORDER — SIMVASTATIN 40 MG
40 TABLET ORAL AT BEDTIME
Qty: 90 TABLET | Refills: 4 | Status: SHIPPED | OUTPATIENT
Start: 2023-03-22 | End: 2024-01-11

## 2023-03-22 ASSESSMENT — ENCOUNTER SYMPTOMS
DIARRHEA: 0
DIZZINESS: 0
NERVOUS/ANXIOUS: 0
HEMATURIA: 0
EYE PAIN: 0
MYALGIAS: 0
COUGH: 0
PARESTHESIAS: 0
HEADACHES: 0
HEARTBURN: 0
SORE THROAT: 0
FEVER: 0
ARTHRALGIAS: 0
ABDOMINAL PAIN: 0
NAUSEA: 0
WEAKNESS: 0
JOINT SWELLING: 0
BREAST MASS: 0
DYSURIA: 0
SHORTNESS OF BREATH: 0
PALPITATIONS: 0
CONSTIPATION: 0
HEMATOCHEZIA: 0
FREQUENCY: 0
CHILLS: 0

## 2023-03-22 ASSESSMENT — ACTIVITIES OF DAILY LIVING (ADL): CURRENT_FUNCTION: NO ASSISTANCE NEEDED

## 2023-03-22 ASSESSMENT — PAIN SCALES - GENERAL: PAINLEVEL: NO PAIN (0)

## 2023-03-22 NOTE — PATIENT INSTRUCTIONS
Plan:  1.  Labs today - [address]   2. Continue same meds, same doses for now   3. Mammogram ( please call [phone] to schedule it)   4.  The following vaccines are recommended for you. Please check with your insurance about coverage.  Some insurances cover better if you have these vaccines at the pharmacy:  -- Pneumonia 20 or Pneumonia 23  -- Tetanus vaccine  - Td  -- Shingrix vaccine - the newest vaccine for shingles   5. Follow up in 1 year

## 2023-03-22 NOTE — PROGRESS NOTES
Dr Theodore's note    Patient's instructions / PLAN:                                                        Plan:  1.  Labs today - [address]   2. Continue same meds, same doses for now   3. Mammogram ( please call [phone] to schedule it)   4.  The following vaccines are recommended for you. Please check with your insurance about coverage.  Some insurances cover better if you have these vaccines at the pharmacy:  -- Pneumonia 20 or Pneumonia 23  -- Tetanus vaccine  - Td  -- Shingrix vaccine - the newest vaccine for shingles   5. Follow up in 1 y  -- flu vaccine          ASSESSMENT & PLAN:                                                      (Z00.00) Routine general medical examination at a health care facility  (primary encounter diagnosis)  Comment:   Plan: simvastatin (ZOCOR) 40 MG tablet, CBC with         platelets, Lipid panel reflex to direct LDL         Fasting, Comprehensive metabolic panel,         Hemoglobin A1c, CK total, Albumin Random Urine         Quantitative with Creat Ratio, TSH with free T4        reflex            (I10) HTN, goal below 140/90  Comment: Controlled    Plan: losartan (COZAAR) 25 MG tablet, CBC with         platelets, Lipid panel reflex to direct LDL         Fasting, Comprehensive metabolic panel,         Hemoglobin A1c, CK total, Albumin Random Urine         Quantitative with Creat Ratio, TSH with free T4        reflex            (E78.5) Hyperlipidemia LDL goal <130  Comment: Controlled    Plan: simvastatin (ZOCOR) 40 MG tablet, CBC with         platelets, Lipid panel reflex to direct LDL         Fasting, Comprehensive metabolic panel,         Hemoglobin A1c, CK total, Albumin Random Urine         Quantitative with Creat Ratio, TSH with free T4        reflex            (Z12.31) Visit for screening mammogram  Comment:   Plan: MA SCREENING DIGITAL BILAT - Future  (s+30)            (Z12.31) Encounter for screening mammogram for breast cancer  Comment:   Plan: MA SCREENING DIGITAL  BILAT - Future  (s+30)            (R73.09) Blood sugar increased  Comment:   Plan: Hemoglobin A1c               Chief Complaint:                                                        Annual exam  Follow up chronic medical problems      SUBJECTIVE:                                                    History of present illness     We reviewed the chronic medical problems as above.   I reviewed the recent tests results in Epic     L ear tinnitus     ROS:     See below      PMHx: - reviewed  Past Medical History:   Diagnosis Date     Osteoporosis        PSHx: reviewed  Past Surgical History:   Procedure Laterality Date     BLADDER SURGERY  2009    sling      COLONOSCOPY  2005     COLONOSCOPY  9/17/2015    Dr. Mccarthy [facility]     COLONOSCOPY N/A 9/17/2015    Procedure: COLONOSCOPY;  Surgeon: Ted Mccarthy MD;  Location:  GI     GYN SURGERY       HYSTERECTOMY, PAP NO LONGER INDICATED      ovaries are present     KNEE SURGERY       ORTHOPEDIC SURGERY       ZZC APPENDECTOMY          Soc Hx: No daily alcohol, no smoking  Social History     Socioeconomic History     Marital status:      Spouse name: Not on file     Number of children: 2     Years of education: Not on file     Highest education level: Not on file   Occupational History     Occupation: Teacher     Employer: [employer]   Tobacco Use     Smoking status: Never     Passive exposure: Never     Smokeless tobacco: Never   Vaping Use     Vaping Use: Never used   Substance and Sexual Activity     Alcohol use: Yes     Comment: occasional     Drug use: No     Sexual activity: Yes     Partners: Male   Other Topics Concern     Parent/sibling w/ CABG, MI or angioplasty before 65F 55M? Not Asked   Social History Narrative    From [place] been in [place] years.     Social Determinants of Health     Financial Resource Strain: Not on file   Food Insecurity: Not on file   Transportation Needs: Not on file   Physical Activity: Not on file  "  Stress: Not on file   Social Connections: Not on file   Intimate Partner Violence: Not on file   Housing Stability: Not on file        Fam Hx: reviewed  Family History   Problem Relation Age of Onset     Alcohol/Drug Father          71yo drinker and smoker     Diabetes Mother          79yo and cataracts     Family History Negative Sister      Hypertension Sister      Cancer Brother          46yo Leukemia      No Known Problems Maternal Grandmother      No Known Problems Maternal Grandfather      No Known Problems Paternal Grandmother      No Known Problems Paternal Grandfather          Screening: reviewed      All: reviewed    Meds: reviewed  Current Outpatient Medications   Medication Sig Dispense Refill     cyclobenzaprine (FLEXERIL) 5 MG tablet        IBANdronate (BONIVA) 150 MG tablet TAKE 1 TABLET EVERY 30 DAYS 3 tablet 3     ibuprofen (ADVIL/MOTRIN) 600 MG tablet Take 1 tablet (600 mg) by mouth every 8 hours as needed for moderate pain 60 tablet 0     losartan (COZAAR) 25 MG tablet Take 1 tablet (25 mg) by mouth daily 90 tablet 3     MULTIVITAMINS OR TABS 1 tablet daily       omega 3 1000 MG CAPS Take 1 g by mouth daily 90 capsule      simvastatin (ZOCOR) 40 MG tablet TAKE 1 TABLET AT BEDTIME 90 tablet 0     VITAMIN C 500 MG OR TABS 1 TABLET TWICE DAILY         OBJECTIVE:                                                    Physical Exam :  Blood pressure 116/70, pulse 71, temperature 97.7  F (36.5  C), temperature source Oral, resp. rate 18, height 1.6 m (5' 3\"), weight 64.9 kg (143 lb 1.6 oz), SpO2 100 %, not currently breastfeeding.     NAD, appears comfortable  Skin clear, no rashes  Neck: supple, no JVD,  no thyroidmegaly  Lymph nodes non palpable in the cervical, supraclavicular axillaries,   Chest: clear to auscultation with good respiratory effort  Cardiac: S1S2, RRR, no mgr appreciated  Abdomen: soft, not tender, not distended, audible bowel sound, no hepatosplenomegaly, no " "palpable masses, no abdominal bruits  Extremities: no cyanosis, clubbing or edema.   Neuro: A, Ox3, no focal signs.  Breast exam in supine and erect position: they are symmetrical, no skin changes, no tenderness or nodes on palpation. Nipples are erect, no skin lesions, no discharge on pressure.    Pelvic exam: deferred, s/p menopause, no symptoms, no hx of abnormal pap         Gabriela Theodore MD  Internal Medicine         SUBJECTIVE:   Terri is a 72 year old who presents for Preventive Visit.  Additional Questions 3/22/2023   Roomed by Shireen Matt   Patient has been advised of split billing requirements and indicates understanding: Yes  Are you in the first 12 months of your Medicare coverage?  No    Healthy Habits:     In general, how would you rate your overall health?  Good    Frequency of exercise:  2-3 days/week    Duration of exercise:  30-45 minutes    Do you usually eat at least 4 servings of fruit and vegetables a day, include whole grains    & fiber and avoid regularly eating high fat or \"junk\" foods?  Yes    Taking medications regularly:  Yes    Medication side effects:  No muscle aches and No significant flushing    Ability to successfully perform activities of daily living:  No assistance needed    Home Safety:  No safety concerns identified    Hearing Impairment:  No hearing concerns    In the past 6 months, have you been bothered by leaking of urine?  No    In general, how would you rate your overall mental or emotional health?  Good      PHQ-2 Total Score: 0    Additional concerns today:  No      Have you ever done Advance Care Planning? (For example, a Health Directive, POLST, or a discussion with a medical provider or your loved ones about your wishes): No, advance care planning information given to patient to review.  Patient declined advance care planning discussion at this time.       Fall risk  Fallen 2 or more times in the past year?: No  Any fall with injury in the past year?: " No    Cognitive Screening   1) Repeat 3 items (Leader, Season, Table)    2) Clock draw: ABNORMAL Put 11:05 am  3) 3 item recall: Recalls 3 objects  Results: 3 items recalled: COGNITIVE IMPAIRMENT LESS LIKELY    Mini-CogTM Copyright MURIEL Castellano. Licensed by the author for use in [facility]; reprinted with permission (rayna@[facility]). All rights reserved.      Do you have sleep apnea, excessive snoring or daytime drowsiness?: no    Reviewed and updated as needed this visit by clinical staff   Tobacco  Allergies  Meds   Med Hx  Surg Hx  Fam Hx          Reviewed and updated as needed this visit by Provider                 Social History     Tobacco Use     Smoking status: Never     Passive exposure: Never     Smokeless tobacco: Never   Substance Use Topics     Alcohol use: Yes     Comment: occasional         Alcohol Use 3/8/2022   Prescreen: >3 drinks/day or >7 drinks/week? No     Do you have a current opioid prescription? No  Do you use any other controlled substances or medications that are not prescribed by a provider? None          Hyperlipidemia Follow-Up      Are you regularly taking any medication or supplement to lower your cholesterol?   Yes- simvastatin\    Are you having muscle aches or other side effects that you think could be caused by your cholesterol lowering medication?  No    Hypertension Follow-up      Do you check your blood pressure regularly outside of the clinic? Yes     Are you following a low salt diet? Yes    Are your blood pressures ever more than 140 on the top number (systolic) OR more   than 90 on the bottom number (diastolic), for example 140/90? No      Current providers sharing in care for this patient include:   Patient Care Team:  Gabriela Acevedo MD as PCP - General (Internal Medicine)  Gabriela Acevedo MD as Assigned PCP    The following health maintenance items are reviewed in Epic and correct as of today:  Health Maintenance   Topic Date  Due     ANNUAL REVIEW OF HM ORDERS  Never done     ZOSTER IMMUNIZATION (1 of 2) Never done     Pneumococcal Vaccine: 65+ Years (2 - PPSV23 if available, else PCV20) 11/09/2017     DTAP/TDAP/TD IMMUNIZATION (2 - Td or Tdap) 10/09/2022     MEDICARE ANNUAL WELLNESS VISIT  03/08/2023     MAMMO SCREENING  03/15/2023     FALL RISK ASSESSMENT  03/22/2024     COLORECTAL CANCER SCREENING  09/17/2025     LIPID  03/08/2027     ADVANCE CARE PLANNING  03/08/2027     DEXA  12/03/2035     HEPATITIS C SCREENING  Completed     PHQ-2 (once per calendar year)  Completed     INFLUENZA VACCINE  Completed     COVID-19 Vaccine  Completed     IPV IMMUNIZATION  Aged Out     MENINGITIS IMMUNIZATION  Aged Out     Labs reviewed in EPIC          Review of Systems   Constitutional: Negative for chills and fever.   HENT: Negative for congestion, ear pain, hearing loss and sore throat.    Eyes: Negative for pain and visual disturbance.   Respiratory: Negative for cough and shortness of breath.    Cardiovascular: Negative for chest pain, palpitations and peripheral edema.   Gastrointestinal: Negative for abdominal pain, constipation, diarrhea, heartburn, hematochezia and nausea.   Breasts:  Negative for tenderness, breast mass and discharge.   Genitourinary: Negative for dysuria, frequency, genital sores, hematuria, pelvic pain, urgency, vaginal bleeding and vaginal discharge.   Musculoskeletal: Negative for arthralgias, joint swelling and myalgias.   Skin: Negative for rash.   Neurological: Negative for dizziness, weakness, headaches and paresthesias.   Psychiatric/Behavioral: Negative for mood changes. The patient is not nervous/anxious.          Patient has been advised of split billing requirements and indicates understanding: Yes At the check in, at the        COUNSELING:  Reviewed preventive health counseling, as reflected in patient instructions       Regular exercise       Healthy diet/nutrition        She reports that she has  never smoked. She has never been exposed to tobacco smoke. She has never used smokeless tobacco.      Appropriate preventive services were discussed with this patient, including applicable screening as appropriate for cardiovascular disease, diabetes, osteopenia/osteoporosis, and glaucoma.  As appropriate for age/gender, discussed screening for colorectal cancer, prostate cancer, breast cancer, and cervical cancer. Checklist reviewing preventive services available has been given to the patient.    Reviewed patients plan of care and provided an AVS. The Basic Care Plan (routine screening as documented in Health Maintenance) for Terri meets the Care Plan requirement. This Care Plan has been established and reviewed with the Patient.          Gabriela Acevedo MD  [facility]    Identified Health Risks:

## 2023-06-01 ENCOUNTER — HEALTH MAINTENANCE LETTER (OUTPATIENT)
Age: 73
End: 2023-06-01

## 2023-10-03 ENCOUNTER — VIRTUAL VISIT (OUTPATIENT)
Dept: INTERNAL MEDICINE | Facility: CLINIC | Age: 73
End: 2023-10-03
Payer: MEDICARE

## 2023-10-03 DIAGNOSIS — I10 HTN, GOAL BELOW 140/90: ICD-10-CM

## 2023-10-03 DIAGNOSIS — Z78.0 POSTMENOPAUSAL STATUS: Primary | ICD-10-CM

## 2023-10-03 DIAGNOSIS — M81.0 OSTEOPOROSIS, UNSPECIFIED OSTEOPOROSIS TYPE, UNSPECIFIED PATHOLOGICAL FRACTURE PRESENCE: ICD-10-CM

## 2023-10-03 PROCEDURE — 99441 PR PHYSICIAN TELEPHONE EVALUATION 5-10 MIN: CPT | Mod: 95 | Performed by: NURSE PRACTITIONER

## 2023-10-03 RX ORDER — LOSARTAN POTASSIUM 25 MG/1
25 TABLET ORAL DAILY
Qty: 90 TABLET | Refills: 1 | Status: SHIPPED | OUTPATIENT
Start: 2023-10-03 | End: 2024-04-23

## 2023-10-03 RX ORDER — IBANDRONATE SODIUM 150 MG/1
TABLET, FILM COATED ORAL
Qty: 3 TABLET | Refills: 3 | Status: SHIPPED | OUTPATIENT
Start: 2023-10-03

## 2023-10-03 NOTE — PATIENT INSTRUCTIONS
Medication refilled     Bone density they will call to set up    Make appointment for lab and follow up in March 2024 with Dr Theodore

## 2023-10-26 ENCOUNTER — OFFICE VISIT (OUTPATIENT)
Dept: FAMILY MEDICINE | Facility: CLINIC | Age: 73
End: 2023-10-26
Payer: MEDICARE

## 2023-10-26 VITALS
TEMPERATURE: 98.2 F | WEIGHT: 145.3 LBS | BODY MASS INDEX: 24.81 KG/M2 | SYSTOLIC BLOOD PRESSURE: 159 MMHG | DIASTOLIC BLOOD PRESSURE: 81 MMHG | HEIGHT: 64 IN | RESPIRATION RATE: 16 BRPM | OXYGEN SATURATION: 98 % | HEART RATE: 72 BPM

## 2023-10-26 DIAGNOSIS — R41.3 MEMORY CHANGES: Primary | ICD-10-CM

## 2023-10-26 DIAGNOSIS — I10 HTN, GOAL BELOW 140/90: ICD-10-CM

## 2023-10-26 LAB — HGB BLD-MCNC: 13.9 G/DL (ref 11.7–15.7)

## 2023-10-26 PROCEDURE — 99214 OFFICE O/P EST MOD 30 MIN: CPT | Performed by: NURSE PRACTITIONER

## 2023-10-26 PROCEDURE — 84443 ASSAY THYROID STIM HORMONE: CPT | Performed by: NURSE PRACTITIONER

## 2023-10-26 PROCEDURE — 36415 COLL VENOUS BLD VENIPUNCTURE: CPT | Performed by: NURSE PRACTITIONER

## 2023-10-26 PROCEDURE — 80053 COMPREHEN METABOLIC PANEL: CPT | Performed by: NURSE PRACTITIONER

## 2023-10-26 PROCEDURE — 85018 HEMOGLOBIN: CPT | Performed by: NURSE PRACTITIONER

## 2023-10-26 RX ORDER — RESPIRATORY SYNCYTIAL VIRUS VACCINE 120MCG/0.5
0.5 KIT INTRAMUSCULAR ONCE
Qty: 1 EACH | Refills: 0 | Status: CANCELLED | OUTPATIENT
Start: 2023-10-26 | End: 2023-10-26

## 2023-10-26 ASSESSMENT — PAIN SCALES - GENERAL: PAINLEVEL: NO PAIN (0)

## 2023-10-26 ASSESSMENT — ENCOUNTER SYMPTOMS
RESPIRATORY NEGATIVE: 1
ACTIVITY CHANGE: 1
GASTROINTESTINAL NEGATIVE: 1
FEVER: 0
MUSCULOSKELETAL NEGATIVE: 1
PALPITATIONS: 0
ENDOCRINE NEGATIVE: 1
UNEXPECTED WEIGHT CHANGE: 1
CONFUSION: 1

## 2023-10-26 NOTE — ASSESSMENT & PLAN NOTE
Memory changes over the last 2-4 years.  Does not remember some recent activities, and family notices changes.  Less socially interactive. Cooks less. Forgetting some directions while driving.  Patient lives at home with her  who has had a stroke.  He does not require extensive care.  Family reports his memory is intact.  No other physical concerns.  Patient doesn't initially feel that she has memory issues, but then does remember some difficulty wayfinding when driving.  Two sons are here to provide examples of recent memory issues.  Concern is to assure appropriate support for her condition and to consider medications early on if she were to be diagnosed with dementia.    history of impaired fasting glucose, and lipid concerns.  Is on losartan 25 mg daily for blood pressure.  Most recent labs in March reviewed.

## 2023-10-26 NOTE — PROGRESS NOTES
Assessment & Plan     ICD-10-CM    1. Memory changes  R41.3 Comprehensive metabolic panel     TSH with free T4 reflex     Hemoglobin     Occupational Therapy Referral     Adult Neurology  Referral     Comprehensive metabolic panel     TSH with free T4 reflex     Hemoglobin      2. HTN, goal below 140/90  I10           Labs today--Follow-up as indicated with primary  OT eval to determine current functioning and plan for care support, address driving issues.  Refer to neurology for further eval  Back to primary within next three months for continuity on plan for memory and BP follow up    There are no Patient Instructions on file for this visit.  No follow-ups on file.      BART Clarke CNP  [facility] [place]  =================    Subjective   Terri is a 73 year old, presenting for the following health issues:  memory concerns         10/26/2023    10:34 AM   Additional Questions   Roomed by Deepa FLYNN CMA   Accompanied by JAVED         10/26/2023    10:34 AM   Patient Reported Additional Medications   Patient reports taking the following new medications None     2-4 years   worse  History of Present Illness       Reason for visit:  [place]    She eats 2-3 servings of fruits and vegetables daily.She exercises with enough effort to increase her heart rate 30 to 60 minutes per day.    She is taking medications regularly.     Memory changes  Memory changes over the last 2-4 years.  Does not remember some recent activities, and family notices changes.  Less socially interactive. Cooks less. Forgetting some directions while driving.  Patient lives at home with her  who has had a stroke.  He does not require extensive care.  Family reports his memory is intact.  No other physical concerns.  Patient doesn't initially feel that she has memory issues, but then does remember some difficulty wayfinding when driving.  Two sons are here to provide examples of recent memory issues.  Concern  "is to assure appropriate support for her condition and to consider medications early on if she were to be diagnosed with dementia.    history of impaired fasting glucose, and lipid concerns.  Is on losartan 25 mg daily for blood pressure.  Most recent labs in March reviewed.    HTN, goal below 140/90  Borderline BP today.  follow up with primary in the next 3 months for recheck      Both of patients son's are present.       Patient states she is here for a check up-she does not remember scheduling this appointment.     Patients son wants to make sure she isn't showing any early signs of dementia and if she is how they can slow it down. Patient son states that her memory has been slowly declining and her personality has been changing as well. Family in [place] have noticed as well.     Patients son states that his dad had a stroke 22 years ago and since then they have not been as socially active as they used to be.  Patient seems to be more drawn in and quiet.       Review of Systems   Constitutional:  Positive for activity change and unexpected weight change. Negative for fever.   HENT: Negative.     Respiratory: Negative.     Cardiovascular:  Negative for chest pain and palpitations.   Gastrointestinal: Negative.    Endocrine: Negative.    Genitourinary: Negative.    Musculoskeletal: Negative.    Psychiatric/Behavioral:  Positive for confusion.     States she has gained weight:  Wt Readings from Last 4 Encounters:   10/26/23 65.9 kg (145 lb 4.8 oz)   03/22/23 64.9 kg (143 lb 1.6 oz)   02/04/23 64.4 kg (142 lb)   06/10/22 62.6 kg (137 lb 14.4 oz)             Objective    BP (!) 159/81 (BP Location: Right arm, Patient Position: Sitting, Cuff Size: Adult Regular)   Pulse 72   Temp 98.2  F (36.8  C) (Oral)   Resp 16   Ht 1.613 m (5' 3.5\")   Wt 65.9 kg (145 lb 4.8 oz)   LMP  (LMP Unknown)   SpO2 98%   BMI 25.34 kg/m    Body mass index is 25.34 kg/m .  Physical Exam  Constitutional:       General: She is not in " acute distress.     Appearance: Normal appearance. She is well-developed.   HENT:      Head: Normocephalic.      Right Ear: Tympanic membrane normal.      Left Ear: Tympanic membrane normal.      Mouth/Throat:      Mouth: Mucous membranes are moist.   Eyes:      Conjunctiva/sclera: Conjunctivae normal.   Cardiovascular:      Rate and Rhythm: Normal rate and regular rhythm.      Heart sounds: Normal heart sounds.   Pulmonary:      Effort: Pulmonary effort is normal.      Breath sounds: Normal breath sounds.   Abdominal:      General: Bowel sounds are normal.      Palpations: Abdomen is soft. There is no mass.      Tenderness: There is no abdominal tenderness.   Musculoskeletal:      Cervical back: Neck supple.   Lymphadenopathy:      Cervical: No cervical adenopathy.   Skin:     General: Skin is warm and dry.      Findings: No rash.   Neurological:      General: No focal deficit present.      Mental Status: She is alert and oriented to person, place, and time.   Psychiatric:         Mood and Affect: Mood normal.         Behavior: Behavior normal.         Thought Content: Thought content normal.      Comments: Shows some lack of memory for recent events.  Deferred full assessment for OT eval

## 2023-10-27 LAB
ALBUMIN SERPL BCG-MCNC: 4.6 G/DL (ref 3.5–5.2)
ALP SERPL-CCNC: 73 U/L (ref 35–104)
ALT SERPL W P-5'-P-CCNC: 29 U/L (ref 0–50)
ANION GAP SERPL CALCULATED.3IONS-SCNC: 16 MMOL/L (ref 7–15)
AST SERPL W P-5'-P-CCNC: 29 U/L (ref 0–45)
BILIRUB SERPL-MCNC: 0.4 MG/DL
BUN SERPL-MCNC: 14.6 MG/DL (ref 8–23)
CALCIUM SERPL-MCNC: 9.9 MG/DL (ref 8.8–10.2)
CHLORIDE SERPL-SCNC: 105 MMOL/L (ref 98–107)
CREAT SERPL-MCNC: 0.91 MG/DL (ref 0.51–0.95)
DEPRECATED HCO3 PLAS-SCNC: 24 MMOL/L (ref 22–29)
EGFRCR SERPLBLD CKD-EPI 2021: 67 ML/MIN/1.73M2
GLUCOSE SERPL-MCNC: 128 MG/DL (ref 70–99)
POTASSIUM SERPL-SCNC: 4.1 MMOL/L (ref 3.4–5.3)
PROT SERPL-MCNC: 7.6 G/DL (ref 6.4–8.3)
SODIUM SERPL-SCNC: 145 MMOL/L (ref 135–145)
TSH SERPL DL<=0.005 MIU/L-ACNC: 2.6 UIU/ML (ref 0.3–4.2)

## 2023-11-06 ENCOUNTER — THERAPY VISIT (OUTPATIENT)
Dept: OCCUPATIONAL THERAPY | Facility: CLINIC | Age: 73
End: 2023-11-06
Attending: NURSE PRACTITIONER
Payer: MEDICARE

## 2023-11-06 DIAGNOSIS — R41.3 MEMORY CHANGES: ICD-10-CM

## 2023-11-06 PROCEDURE — 97165 OT EVAL LOW COMPLEX 30 MIN: CPT | Mod: GO

## 2023-11-06 NOTE — PROGRESS NOTES
"OCCUPATIONAL THERAPY EVALUATION  Type of Visit: Evaluation    See electronic medical record for Abuse and Falls Screening details.    Subjective      Presenting condition or subjective complaint: memory changes, patient unaware of reason for visit.   Date of onset: 2-4 years ago10/26/23    Relevant medical history:  Patient does not identify PMH, but per chart review (2/2022) acute cytitis with hematuria, (08/2020) open wound of L letter finger, (02/2018) acute cystitis with hematuria. Overall, no significant medical history per chart review.     Per chart review, \"Memory changes over the last 2-4 years.  Does not remember some recent activities, and family notices changes.  Less socially interactive. Cooks less. Forgetting some directions while driving.  Patient lives at home with her  who has had a stroke.  He does not require extensive care.  Family reports his memory is intact.  No other physical concerns.  Patient doesn't initially feel that she has memory issues, but then does remember some difficulty wayfinding when driving.  Two sons are here to provide examples of recent memory issues.  Concern is to assure appropriate support for her condition and to consider medications early on if she were to be diagnosed with dementia. Patients son wants to make sure she isn't showing any early signs of dementia and if she is how they can slow it down. Patient son states that her memory has been slowly declining and her personality has been changing as well. Family in [place] have noticed as well. \"    Dates & types of surgery: N/A    Prior diagnostic imaging/testing results:       Prior therapy history for the same diagnosis, illness or injury: No      Prior Level of Function  Transfers: Independent  Ambulation: Independent  ADL: Independent  IADL: Driving, Finances, Housekeeping, Laundry, Meal preparation, Medication management, School, Work    Living Environment  Social support: With a significant other or " spouse   Type of home: House   Stairs to enter the home: No   Is there a railing: No   Stairs inside the home: Yes   Is there a railing: Yes   Help at home: None  Equipment owned:  None     Employment: No  - [employer] in [place]  Hobbies/Interests: read [place] books    Patient goals for therapy: N/A    Pain assessment:  No pain.      Objective   Cognitive Status Examination  Orientation: Oriented to person, place and time   Level of Consciousness: Alert  Follows Commands and Answers Questions: 75% of the time  Personal Safety and Judgement: Impaired  Memory: Impaired  Attention:  Challenges on attention portion of MOCA including having challenges repeated numbers in backwards order and subtracting 7 from 100.   Organization/Problem Solving: Sequencing impaired, Problem solving impaired  Executive Function: Working memory impaired, decreased storage of information for performing tasks, Self awareness/monitoring impaired  Comments: Reports no challenges forgetting to take medications. Reports that family says that she is quieter and does not talk to as many people. Terri reports no awareness of memory changes and unsure why she is scheduled for this appointment on this date. See above for more details.     MOCA  To further assess cognition, administered the Paddy Cognitive Assessment (MoCA), which was designed as a rapid screening instrument for mild cognitive dysfunction with a score of 26/30 considered normal. Administered MoCA 8.1 with pt scoring 20/30 (MIS = 3/15), indicatingg mild cognitive impairment Pt scored 2/5 on visuospatial/executive, 3/3 on naming, 2/6 on attention, 2/3 on language, 2/2 on abstraction, 0/5 on delayed recall, and 5/6 on orientation. Terri demonstrated challenges with memory recall, visuospatial/ executive, language and attention. On the visuospatial section, connects numbers to numbers rather than numbers to ladders, misses lines on the cube, and had incorrect hand  placement on the clock. Terri had challenges on the attention sub-section when having to recall numbers backwards vs forwards (recalls the numbers forwards both times) and only able to subtract 7 one time correctly from 100. On the language section, Terri only able to name 4 words that start with an F in one minute. On the delayed recall section, Terri only able to recall 3/5 words with a multiple choice cue.  Lastly, on the orientation sub-section - Terri says it is the 7th or 8th instead of the 6th. Terri reports she feels that the screening went alright.     VISUAL SKILLS  Visual Acuity: Wears glasses  Visual Field: Appears normal  Visual Attention: Appears normal  Oculomotor: No concerns.     SENSATION: UE Sensation WNL, LE Sensation WNL    POSTURE: WFL  RANGE OF MOTION: UE AROM WFL  STRENGTH: UE Strength WNL, UE Strength WFL.  strength measures inconsistent, with patient reporting no challenges with strength.   R hand  strength: 55 lbs then 25 lbs then 30 lbs (inconsistent). Norm: 49.6 lbs.   L hand  strength: 39 lbs. Norm: 41.5 lbs.   MUSCLE TONE: WFL  COORDINATION: WFL  Left Hand, Nine Hole Peg Test (sec): 20.41 seconds (norm: 22.0 seconds)  Right Hand, Nine Hole Peg Test (sec): 24.23 seconds (norm: 20.2 seconds)   BALANCE: WFL    FUNCTIONAL MOBILITY  Assistive Device(s): None  Ambulation: No concerns.   Wheelchair: No concerns.     BED MOBILITY: WFL, Independent    TRANSFERS: WFL, Independent    BATHING: WFL, Independent  Equipment:  None.     UPPER BODY DRESSING: WFL, Independent  Equipment:  None.     LOWER BODY DRESSING: WFL, Independent  Equipment:  None    TOILETING: WFL, Independent  Equipment:  None    GROOMING: WFL, Independent  Equipment:  None     EATING/SELF FEEDING: WFL, Independent   Equipment:  None    ACTIVITY TOLERANCE: Reports no challenges getting done her daily activities. Her day consists of walking on a treadmill, reading, cooking dinner, etc. Goes to [employer] on Monday and Thursday. Used to go to yoga on Tuesdays and Thursdays, reports wanting to go back to this. Does not take naps. Reports that she sees her neighbor Mondays and Thursdays, talks to the neighbors, etc. Does not have to many friends near by, her family reports she has not been seeing too many people.     INSTRUMENTAL ACTIVITIES OF DAILY LIVING (IADL):   Meal Planning/Prep: Does all the meal preparation, follows recipes with no challenges. Does the grocery shopping.   Home/Financial Management: Her  does the finances. Reports that is what he  does since   Communication/Computer Use: Use cell phone a lot to talk to her sister in [place]. If I don't know something, her  helps.   Community Mobility: No concerns reported while driving, but some challenges with wayfinding reported in chart review.  Care of Others:- Have 7 grandkids. Reports that they live in [place] in [facility]. Reports she does not see them much but once in a while.      Assessment & Plan   CLINICAL IMPRESSIONS  Medical Diagnosis: R41.3 (ICD-10-CM) - Memory changes    Treatment Diagnosis: decreased IND with IADLs/ ADLs    Impression/Assessment: Pt is a 73 year old female presenting to Occupational Therapy due to memory changes.  The following significant findings have been identified: Impaired cognition and impaired awareness .  These identified deficits interfere with their ability to perform household chores, community mobility, medication management, financial management, and meal planning and preparation as compared to previous level of function. Occupational therapy is medically necessary to address Terri's cognition and increase her independence in daily activities.     Clinical Decision Making (Complexity):  Assessment of Occupational Performance: 3-5 Performance Deficits  Occupational Performance Limitations: driving and community mobility, health management and maintenance, home establishment and  management, meal preparation and cleanup, shopping, and social participation  Clinical Decision Making (Complexity): Low complexity    PLAN OF CARE  Treatment Interventions:  Interventions: Cognitive Skills, Self-Care/Home Management, Therapeutic Activity    Long Term Goals   OT Goal 1  Goal Identifier: Cognition/ Safety  Goal Description: Patient and caregiver/staff to verbalize understanding of cognitive screen and Cognitive Performance Test results and identify 3 strategies to increase patient's safety and independence in the home and community setting.  Target Date: 02/04/24  OT Goal 2  Goal Identifier: Adaptive Strategies  Goal Description: Terri will verbalize 3 adaptive strategies (i.e using calendar, association, chunking, etc) to increase independence with IADLS/ ADLs (meal preparation, finances, medication management etc.)  Target Date: 02/04/24  OT Goal 3  Goal Identifier: Problem Solving  Goal Description: Terri will demonstrate the ability to complete moderate to complex problem-solving/planning tasks (logic puzzles, Errands, [employer], etc.) with 80% accuracy to complete home and work tasks safely and accurately such as meal prepation, medication management, etc  Target Date: 02/04/24  OT Goal 4  Goal Identifier: Community Mobility  Goal Description: Terri will demonstrate modified I with visual scanning, reaction time, divided attention, and problem solving to ensure safety for community mobility (i.e. crossing the street, pathfinding, etc.)  Target Date: 02/04/24      Frequency of Treatment: 1x / week (start with 4 scheduled following CPT)  Duration of Treatment: 8 weeks     Recommended Referrals to Other Professionals:  N/A   Education Assessment: Learner/Method: Patient;Listening  Education Comments: Educated on OT role, plan of care, MOCA results, and evaluation interpretation - see eval for more details.     Risks and benefits of evaluation/treatment have been explained.    Patient/Family/caregiver agrees with Plan of Care.     Evaluation Time: 40      Signing Clinician: NAV Smart      [facility]                                                                                   OUTPATIENT OCCUPATIONAL THERAPY      PLAN OF TREATMENT FOR OUTPATIENT REHABILITATION   Patient's Last Name, First Name, Terri Ryan    YOB: 1950   Provider's Name   [facility]   Medical Record No.  [id number]     Onset Date: 10/26/23 Start of Care Date: 11/06/23     Medical Diagnosis:  R41.3 (ICD-10-CM) - Memory changes      OT Treatment Diagnosis:  decreased IND with IADLs/ ADLs Plan of Treatment  Frequency/Duration:1x / week (start with 4 scheduled following CPT)/8 weeks    Certification date from 11/06/23   To 02/04/24        See note for plan of treatment details and functional goals     NAV Smart                         I CERTIFY THE NEED FOR THESE SERVICES FURNISHED UNDER        THIS PLAN OF TREATMENT AND WHILE UNDER MY CARE     (Physician attestation of this document indicates review and certification of the therapy plan).                Referring Provider:  Cherry Wood      Initial Assessment  See Epic Evaluation- 11/06/23    Thank you for referring Terri to outpatient pediatric therapy at [facility]. Please contact me with any questions or concerns at my email or phone number listed below.    -----------------------------------  NAV Smart/L  Occupational Therapist     [address] [address]   [email or web address]   Phone: [phone]  Fax: [phone]  Employed by [employer]

## 2023-11-29 ENCOUNTER — THERAPY VISIT (OUTPATIENT)
Dept: OCCUPATIONAL THERAPY | Facility: CLINIC | Age: 73
End: 2023-11-29
Attending: INTERNAL MEDICINE
Payer: MEDICARE

## 2023-11-29 DIAGNOSIS — R41.3 MEMORY CHANGES: Primary | ICD-10-CM

## 2023-11-29 PROCEDURE — 97535 SELF CARE MNGMENT TRAINING: CPT | Mod: GO

## 2023-11-29 PROCEDURE — 97537 COMMUNITY/WORK REINTEGRATION: CPT | Mod: GO

## 2023-11-29 PROCEDURE — 96125 COGNITIVE TEST BY HC PRO: CPT | Mod: GO

## 2023-11-29 NOTE — PROGRESS NOTES
"Cognitive Performance Test    SUMMARY OF TEST:    The Cognitive Performance Test (CPT) is a standardized performance-based assessment to measure working memory/executive function processing capacities that underlie functional performance. Subtasks include common basic and instrumental activities of daily living (ADL/IADL) which are rated based on the manner in which patients respond to task demands of varying complexity. The total CPT score describes a level of functioning that indicates how information is processed, implications for functional activities, potential safety risks and a recommended level of supervision or assist based on cognitive function. The highest total score on this test is in the range of 5.6 to 5.8.    DATE OF TESTIN23    RESULTS OF TESTING:                                                                                         CPT Subtest Results    MEDBOX: 4.5 / 6 SHOP/GLOVES: 4.0 / 6 PHONE: 4.0 / 6   WASH:  4.5 / 5 TRAVEL: n/a TOAST: 4.0 / 5   DRESS: 4.0 / 5   TOTAL CPT SCORE:  25 / 33     Average CPT Score  4.2 / 5.6    INTERPRETATION OF TEST RESULTS:    Based on the Cognitive Performance Test, this patient scored at CPT Level 4.2.  See CPT Levels reference below.    Summary of functional cognitive status:   Medbox: Pt tries to initiate task prior to receiving instructions. She initially places 1 of 4 medications correctly. Is able to correct prn medication and every other day medication with specific cues, but she is unable to correct \"two pills twice daily\" medication with specific cues.    Shop: Does not check wallet prior to selection. Initially chooses $9.59 belt. Needs verbal cue to look at bottom belts as she states \"They're all $9.59,\" but requires belt exchange done for her. Pays exact change.    Wash: Requires repeat of instruction and then additional verbal cue to get to the sink. Then sequences appropriately.    Toast: Pt initiates task quickly with buttering the bread " "before toasting it. Also adds jam. Chose to score alternate task of buttering the bread. Pt reports \"I couldn't use the toaster because there was no electricity\" (not plugged in).    Phone: Following repeat of instructions and specific directions, pt requires phone number given. Dials with one number error. Asks correct question.    Dress: Selects only a coat (appropriately insulated). No headwear chosen despite cue back to the closet.    Factors affecting performance:  Possible cultural/language barrier  Impulsive actions  New or complex medical issue    Of note, patient originally from [facility], but she did move to the [place] around 30 years ago per her report. She additionally demonstrates impulsivity with initiation of tasks and does require some repetition of instructions today as a result. Cultural barrier and impulsivity may have played a role in patient's lower score today and was considered when providing recommendations.    Recommendations:    Assist for ADL/IADL:  Finances, Driving, and Medication management  Supervision for ADL/IADL:  Meal preparation, Shopping, and Care of family member  Supervision in living setting:  Daily checks                                                       TIME ADMINISTERING TEST: 25 min    TIME FOR INTERPRETATION AND PREPARATION OF REPORT: 15 min    TOTAL TIME: 40 min      CPT Levels Reference:    Patient's Average CPT Score:  4.2                                                                                                                                                  Individual scores range along a continuum as outlined below.  In addition to cognitive status, other factors may affect safety in a home environment.  Please refer to specific recommendations for this patient.    ___5.6-5.8  Normal functioning (absence of cognitive-functional disability).  Independent in managing personal affairs, monitors and directs own behavior.  Uses complex information to carry out daily " "activities with safety and accuracy.    Proficient with instrumental activities of daily living (IADL) and learning new activity.  Problems are anticipated, errors are avoided, and consequences of actions are considered.      ___5.0   Mild cognitive-functional disability; deficits in working memory and executive thought processes. Difficulty using complex information. Problems may be observed with recent memory, judgment, reasoning and planning ahead. May be impulsive or have difficulty anticipating consequences.  Safety:  May require assistance to plan ahead; or to manage complex medication schedules, appointments or finances.  Hazardous activities may need to be monitored or limited.  ADL:  Mild functional decline.  Able to complete basic self-care and routine household tasks.  May have difficulty with complex daily tasks such as reading, writing, meal preparation, shopping or driving.   Learns through hands on teaching. Self-centered behavior or difficulty considering the needs of others may be seen related to trouble seeing the  whole picture\". Can appear disorganized or uninhibited.    _X__4.5  Mild to moderate cognitive-functional disability. Significant deficits in working memory and executive thought processes. Judgment, reasoning and planning show obvious impairment.  Distractible with inability to shift attention/actions given competing stimuli.  Difficulty with problem solving and managing details. Complex daily tasks performed with inconsistency, difficulty, or error.     Safety:  Medications should be monitored, stove use may require supervision, and driving ability may be affected.  Impaired safety awareness with inability to anticipate potential problems.  May not recognize or respond to emergent situations. Requires frequent check-in support.   ADL:  Mild difficulty with simple everyday self-care tasks. Benefits from structured, routine activity.  Will likely need reminders to complete tasks outside " of the routine. Requires assistance with planning and IADL tasks like shopping and finances. Learns concrete tasks through repetition, but performance may not generalize. Tends to be impulsive with poor insight. Self centered behavior or inability to consider the needs of others is common.    ___4.0  Moderate cognitive-functional disability; abstract to concrete thought processes. Working memory and executive function impairments are obvious. Difficulty with planning and problem solving.  Behavior is goal-directed, but unable to follow multi-step directions, is easily distracted, and may not recognize mistakes.  Inability to anticipate hazards or understand precautions.  Safety:  Recommend 24-hour supervision for safety. Supervision needed for medication management and for hazardous activities. May not be able to follow a restricted diet. Can get lost in unfamiliar surroundings. Generally, persons functioning at level 4 should not be driving.   ADL:  Some decline in quality or frequency of ADL.  [place] enhanced by use of a routine, simple concrete directions, and caregiver set-up of needed items. Complex tasks such as money or home management typically requires assistance.  Relies heavily on vision to guide behavior; will ignore objects/hazards not in plain sight and can be distracted by irrelevant objects. Often has poor insight.  Able to carry out social conversation and may verbally  cover  for deficits leading caregivers to believe they are capable of functioning independently.       ___3.5  Moderate cognitive-functional disability; increased cues needed for task completion. Aware of concrete task steps but needs prompting or cues to initiate and complete simple tasks. Attention span is limited, simple directions may need to be repeated, and re-focus to a topic or task may be required.  Safety:  24-hour supervision required for safety and for assistance with daily tasks. Assistance required with  medications, and access to medication should be limited. Meals, nutrition and dietary restrictions need to be monitored.  All hazardous activities should be restricted or supervised. Should not drive. Prone to wandering and can become lost.  ADL:  Moderate functional decline. Familiar tasks usually requires set-up of supplies and directions to complete steps. May need objects handed to them for task initiation. Function best with a set schedule in familiar surroundings with familiar people. All complex tasks must be done by others. Vocabulary is diminished and speech often unfocused.

## 2024-01-04 NOTE — PROGRESS NOTES
INITIAL NEUROLOGY CONSULTATION    DATE OF VISIT: 1/5/2024  CLINIC LOCATION: [facility]  MRN: [id number]  PATIENT NAME: Terri Ratliff  YOB: 1950    REASON FOR VISIT: No chief complaint on file.    HISTORY OF PRESENT ILLNESS:                                                    Ms. Terri Ratliff is 73 year old right handed female patient with past medical history of hyperlipidemia, hypertension, impaired fasting glucose, and osteoporosis, who was seen today for memory loss.  Accompanied by ***.    Per patient's report, ***.    According to family reports, cognitive decline was noticed over the last 2 to 4 years.  She is not able to remember recent activities.  She is less socially interactive.  She cooks less.  Might get lost while driving.  Lives at home with her , who had strokes.    According to occupational therapy evaluation from 11/6/2023, MoCA 8.1 was 20/30 (2/5 visuospatial/executive, 3/3 on naming, 2/6 on attention, 2/3 on language, 2/2 on obstruction, 0/5 on memory recall, and 5/6 on orientation).  CPT from 11/29/2023 was 4.2/5.6, consistent with moderate cognitive functional disability that requires daily checks for safety.    Most recent laboratory relation from October 2023 includes unremarkable CMP, except glucose of 128, normal TSH (2.6), and hemoglobin (13.9).  Hemoglobin A1C was 5.6 in March 2023.  B12 was not tested.    No recent brain imaging.  Head CT from 10/20/2003 was unremarkable.    No additional useful information is available in Care Everywhere, which was reviewed.  PAST MEDICAL/SURGICAL HISTORY:                                                    I personally reviewed patient's past medical and surgical history with the patient at today's visit.  MEDICATIONS:                                                    I personally reviewed patient's medications and allergies with the patient at today's visit.  ALLERGIES:                                                       Allergies   Allergen Reactions    Lisinopril      Cough       EXAM:                                                    VITAL SIGNS:   LMP  (LMP Unknown)   [place] Cognitive Assessment: Performed during occupational therapy evaluation from 11/6/2023, MoCA 8.1 was 20/30 (2/5 visuospatial/executive, 3/3 on naming, 2/6 on attention, 2/3 on language, 2/2 on obstruction, 0/5 on memory recall, and 5/6 on orientation).    General: pt is in NAD, cooperative.  Skin: normal turgor, moist mucous membranes, no lesions/rashes noticed.  HEENT: ATNC, EOMI, PERRL, white sclera, normal conjunctiva, no nystagmus or ptosis. No carotid bruits bilaterally.  Respiratory: lung sounds clear to auscultation bilaterally, no crackles, wheezes, rhonchi. Symmetric lung excursion, no accessory respiratory muscle use.  Cardiovascular: normal S1/S2, no murmurs/rubs/gallops.   Abdomen: Not distended.  : deferred.    Neurological:  Mental: alert, follows commands, MoCA as per above, no aphasia or dysarthria. Fund of knowledge is diminished for age.  Cranial Nerves:  CN II: visual acuity - able to accurately count fingers with each eye. Visual fields intact, fundi: discs sharp, no papilledema and normal vessels bilaterally.  CN III, IV, VI: EOM intact, pupils equal and reactive  CN V: facial sensation nl  CN VII: face symmetric, no facial droop  CN VIII: hearing normal  CN IX: palate elevation symmetric, uvula at midline  CN XI SCM normal, shoulder shrug nl  CN XII: tongue midline  Motor: Strength: 5/5 in all major groups of all extremities. Normal tone. No abnormal movements. No pronator drift b/l.  Reflexes: Triceps, biceps, brachioradialis, patellar, and achilles reflexes normal and symmetric. No clonus noted. Toes are down-going b/l.   Sensory: light touch, pinprick, and vibration intact. Romberg: negative.  Coordination: FNF and heel-shin tests intact b/l.   Gait:  Normal, able to tandem walk *** without difficulty.  DATA:    LABS/EEG/IMAGING/OTHER STUDIES: I reviewed pertinent medical records, as detailed in the history of present illness.  ASSESSMENT AND PLAN:      ASSESSMENT: Terri Ratliff is a 73 year old female patient with listed above past medical history, who presents with progressive cognitive decline over the last 2 to 4 years.    We had a detailed discussion with the patient and *** regarding her presenting complaints.  She had MoCA approximately 2 months ago with the score of 20/30 during occupational therapy evaluation.  This would be consistent with mild cognitive impairment.  The neurological exam today is non-focal.  We discussed different etiology of mild cognitive impairment, including vitamin deficiencies, structural brain lesions, and neurodegenerative conditions.  For further diagnostic clarification I ordered additional labs, neuropsychologic testing, and brain MRI without contrast.    DIAGNOSES:  No diagnosis found.  PLAN: There are no Patient Instructions on file for this visit.    Total Time: *** minutes spent on the date of the encounter doing chart review, history and exam, documentation and further activities per the note.    Nick Lauren MD  [facility]  (Chart documentation was completed in part with Dragon voice-recognition software. Even though reviewed, some grammatical, spelling, and word errors may remain.)

## 2024-01-05 ENCOUNTER — OFFICE VISIT (OUTPATIENT)
Dept: NEUROLOGY | Facility: CLINIC | Age: 74
End: 2024-01-05
Attending: NURSE PRACTITIONER
Payer: MEDICARE

## 2024-01-05 VITALS
DIASTOLIC BLOOD PRESSURE: 84 MMHG | HEIGHT: 64 IN | BODY MASS INDEX: 25.34 KG/M2 | OXYGEN SATURATION: 96 % | SYSTOLIC BLOOD PRESSURE: 133 MMHG | HEART RATE: 68 BPM

## 2024-01-05 DIAGNOSIS — G31.84 MILD COGNITIVE IMPAIRMENT: Primary | ICD-10-CM

## 2024-01-05 PROCEDURE — 99205 OFFICE O/P NEW HI 60 MIN: CPT | Performed by: PSYCHIATRY & NEUROLOGY

## 2024-01-05 NOTE — PATIENT INSTRUCTIONS
AFTER VISIT SUMMARY (AVS):    At today's visit we thoroughly discussed various diagnostic possibilities for your symptoms, necessary evaluation, and the plan, which includes:  Orders Placed This Encounter   Procedures    MR Brain w/o Contrast    Vitamin B6    Vitamin B12    Vitamin B1 whole blood    Methylmalonic Acid    Adult Neuropsychology  Referral     No new medications.    Stay physically and mentally active with particular emphasis on daily mentally stimulating activities of your choice (such as crosswords, puzzles, sudoku, etc.), stretching exercises, walking, and healthy eating.     Additional recommendations after the work-up.    Next follow-up appointment is in the next 6 months or earlier if needed.    Please do not hesitate to call me with any questions or concerns.    Thanks.

## 2024-01-05 NOTE — LETTER
1/5/2024         RE: Terri Ratliff  [address]  [place] [address]        Dear Colleague,    Thank you for referring your patient, Terri Ratliff, to the [facility]. Please see a copy of my visit note below.    INITIAL NEUROLOGY CONSULTATION    DATE OF VISIT: 1/5/2024  CLINIC LOCATION: [facility]  MRN: [id number]  PATIENT NAME: Terri Ratliff  YOB: 1950    REASON FOR VISIT:   Chief Complaint   Patient presents with     Consult     Memory Changes- patient is not concerned but family is very concerned with her forgetting things and changes in her cognition      HISTORY OF PRESENT ILLNESS:                                                    Ms. Terri Ratliff is 73 year old right handed female patient with past medical history of hyperlipidemia, hypertension, impaired fasting glucose, and osteoporosis, who was seen today for memory loss.  Accompanied by her son.    Per patient's report, she does not believe that she has memory issues.  Denies any focal neurological symptoms.  No prior history of head injuries, seizures, CNS infections, or strokes.    According to her son, cognitive decline started gradually over the last 4 years.  She is not able to remember recent activities.  She did not remember her garage code that was not changed for several years.  There is no word finding difficulties or trouble with calendar.  She does not misplace her belongings.  She takes her medications on time.  Her  is in charge of finances.  She is less socially interactive.  She cooks less.  There are no other significant mood or personality changes.  Might get lost while driving.  Lives at home with her , who had a stroke.    According to occupational therapy evaluation from 11/6/2023, MoCA 8.1 was 20/30 (2/5 visuospatial/executive, 3/3 on naming, 2/6 on attention, 2/3 on language, 2/2 on obstruction, 0/5 on memory recall, and 5/6  "on orientation).  CPT from 11/29/2023 was 4.2/5.6, consistent with moderate cognitive functional disability that requires at minimum daily checks for safety, though technically should be 24-hour supervision.    Most recent laboratory evaluation from October 2023 includes unremarkable CMP, except glucose of 128, normal TSH (2.6), and hemoglobin (13.9).  Hemoglobin A1C was 5.6 in March 2023.  B12 was not tested.    No recent brain imaging.  Head CT from 10/20/2003 was unremarkable.    No additional useful information is available in Care Everywhere, which was reviewed.  PAST MEDICAL/SURGICAL HISTORY:                                                    I personally reviewed patient's past medical and surgical history with the patient at today's visit.  MEDICATIONS:                                                    I personally reviewed patient's medications and allergies with the patient at today's visit.  ALLERGIES:                                                      Allergies   Allergen Reactions     Lisinopril      Cough       EXAM:                                                    VITAL SIGNS:   BP (!) 142/86 (BP Location: Left arm, Patient Position: Sitting, Cuff Size: Adult Regular)   Pulse 75   Ht 1.613 m (5' 3.5\")   LMP  (LMP Unknown)   SpO2 100%   BMI 25.34 kg/m      [place] Cognitive Assessment: Performed during occupational therapy evaluation on 11/6/2023, MoCA 8.1 was 20/30 (2/5 visuospatial/executive, 3/3 on naming, 2/6 on attention, 2/3 on language, 2/2 on obstruction, 0/5 on memory recall, and 5/6 on orientation).    General: pt is in NAD, cooperative.  Skin: normal turgor, moist mucous membranes, no lesions/rashes noticed.  HEENT: ATNC, EOMI, PERRL, white sclera, normal conjunctiva, no nystagmus or ptosis. No carotid bruits bilaterally.  Respiratory: lung sounds clear to auscultation bilaterally, no crackles, wheezes, rhonchi. Symmetric lung excursion, no accessory respiratory muscle " use.  Cardiovascular: normal S1/S2, no murmurs/rubs/gallops.   Abdomen: Not distended.  : deferred.    Neurological:  Mental: alert, follows commands, MoCA as per above, no aphasia or dysarthria. Fund of knowledge is diminished for age.  Cranial Nerves:  CN II: visual acuity - able to accurately count fingers with each eye. Visual fields intact, fundi: discs sharp, no papilledema and normal vessels bilaterally.  CN III, IV, VI: EOM intact, pupils equal and reactive  CN V: facial sensation nl  CN VII: face symmetric, no facial droop  CN VIII: hearing normal  CN IX: palate elevation symmetric, uvula at midline  CN XI SCM normal, shoulder shrug nl  CN XII: tongue midline  Motor: Strength: 5/5 in all major groups of all extremities. Normal tone. No abnormal movements. No pronator drift b/l.  Reflexes: Triceps, biceps, brachioradialis, patellar, and achilles reflexes normal and symmetric. No clonus noted. Toes are down-going b/l.   Sensory: light touch, pinprick, and vibration intact. Romberg: negative.  Coordination: FNF and heel-shin tests intact b/l.   Gait:  Normal, able to tandem walk with minimal difficulty.  DATA:   LABS/EEG/IMAGING/OTHER STUDIES: I reviewed pertinent medical records, as detailed in the history of present illness.  ASSESSMENT AND PLAN:      ASSESSMENT: Terri Ratliff is a 73 year old female patient with listed above past medical history, who presents with progressive cognitive decline over the 4 years.    We had a detailed discussion with the patient and her son regarding her presenting complaints.  She had MoCA approximately 2 months ago with the score of 20/30 during occupational therapy evaluation.  This would be consistent with mild cognitive impairment.  Her CPT was 4.2/5.6, consistent with moderate cognitive functional disability that at the face value would require 24-hour supervision.  I discussed these results and advised her not to drive until she passes behind the wheel driving  evaluation.  The neurological exam today is non-focal.  We discussed various etiologies of mild cognitive impairment, including vitamin deficiencies, structural brain lesions, and neurodegenerative conditions.  For further diagnostic clarification, I ordered additional labs, neuropsychologic testing, and brain MRI without contrast.    DIAGNOSES:    ICD-10-CM    1. Mild cognitive impairment  G31.84 Adult Neurology  Referral        PLAN: At today's visit we thoroughly discussed various diagnostic possibilities for patient's symptoms, necessary evaluation, and the plan, which includes:  Orders Placed This Encounter   Procedures     MR Brain w/o Contrast     Vitamin B6     Vitamin B12     Vitamin B1 whole blood     Methylmalonic Acid     Adult Neuropsychology  Referral     No new medications.    I advised the patient to stay physically and mentally active with particular emphasis on daily mentally stimulating activities of her choice (such as crosswords, puzzles, sudoku, etc.), stretching exercises, walking, and healthy eating.     Additional recommendations after the work-up.    Next follow-up appointment is in the next 6 months or earlier if needed.    Total Time: 61 minutes spent on the date of the encounter doing chart review, history and exam, documentation and further activities per the note.    Nick Lauren MD  [facility]  (Chart documentation was completed in part with Dragon voice-recognition software. Even though reviewed, some grammatical, spelling, and word errors may remain.)            Terri Ratliff is a 73 year old female who presents for:  Chief Complaint   Patient presents with     Consult     Memory Changes- patient is not concerned but family is very concerned with her forgetting things and changes in her cognition         Initial Vitals:  BP (!) 142/86 (BP Location: Left arm, Patient Position: Sitting, Cuff Size: Adult Regular)   Pulse 75   Ht 1.613  "m (5' 3.5\")   LMP  (LMP Unknown)   SpO2 100%   BMI 25.34 kg/m   Estimated body mass index is 25.34 kg/m  as calculated from the following:    Height as of this encounter: 1.613 m (5' 3.5\").    Weight as of 10/26/23: 65.9 kg (145 lb 4.8 oz).. Body surface area is 1.72 meters squared. BP completed using cuff size: regular    2nd set of vitals taken on left arm while seated with adult regular cuff  BP- 133/84,  Pulse-68,  O2-96    Fidelina Noyola       Again, thank you for allowing me to participate in the care of your patient.        Sincerely,        Nick Lauren MD  "

## 2024-01-05 NOTE — PROGRESS NOTES
"Terri Ratliff is a 73 year old female who presents for:  Chief Complaint   Patient presents with    Consult     Memory Changes- patient is not concerned but family is very concerned with her forgetting things and changes in her cognition         Initial Vitals:  BP (!) 142/86 (BP Location: Left arm, Patient Position: Sitting, Cuff Size: Adult Regular)   Pulse 75   Ht 1.613 m (5' 3.5\")   LMP  (LMP Unknown)   SpO2 100%   BMI 25.34 kg/m   Estimated body mass index is 25.34 kg/m  as calculated from the following:    Height as of this encounter: 1.613 m (5' 3.5\").    Weight as of 10/26/23: 65.9 kg (145 lb 4.8 oz).. Body surface area is 1.72 meters squared. BP completed using cuff size: regular    2nd set of vitals taken on left arm while seated with adult regular cuff  BP- 133/84,  Pulse-68,  O2-96    Fidelina Noyola   "

## 2024-01-05 NOTE — PROGRESS NOTES
INITIAL NEUROLOGY CONSULTATION    DATE OF VISIT: 1/5/2024  CLINIC LOCATION: [facility]  MRN: [id number]  PATIENT NAME: Terri Ratliff  YOB: 1950    REASON FOR VISIT:   Chief Complaint   Patient presents with    Consult     Memory Changes- patient is not concerned but family is very concerned with her forgetting things and changes in her cognition      HISTORY OF PRESENT ILLNESS:                                                    Ms. Terri Ratliff is 73 year old right handed female patient with past medical history of hyperlipidemia, hypertension, impaired fasting glucose, and osteoporosis, who was seen today for memory loss.  Accompanied by her son.    Per patient's report, she does not believe that she has memory issues.  Denies any focal neurological symptoms.  No prior history of head injuries, seizures, CNS infections, or strokes.    According to her son, cognitive decline started gradually over the last 4 years.  She is not able to remember recent activities.  She did not remember her garage code that was not changed for several years.  There is no word finding difficulties or trouble with calendar.  She does not misplace her belongings.  She takes her medications on time.  Her  is in charge of finances.  She is less socially interactive.  She cooks less.  There are no other significant mood or personality changes.  Might get lost while driving.  Lives at home with her , who had a stroke.    According to occupational therapy evaluation from 11/6/2023, MoCA 8.1 was 20/30 (2/5 visuospatial/executive, 3/3 on naming, 2/6 on attention, 2/3 on language, 2/2 on obstruction, 0/5 on memory recall, and 5/6 on orientation).  CPT from 11/29/2023 was 4.2/5.6, consistent with moderate cognitive functional disability that requires at minimum daily checks for safety, though technically should be 24-hour supervision.    Most recent laboratory evaluation from October 2023  "includes unremarkable CMP, except glucose of 128, normal TSH (2.6), and hemoglobin (13.9).  Hemoglobin A1C was 5.6 in March 2023.  B12 was not tested.    No recent brain imaging.  Head CT from 10/20/2003 was unremarkable.    No additional useful information is available in Care Everywhere, which was reviewed.  PAST MEDICAL/SURGICAL HISTORY:                                                    I personally reviewed patient's past medical and surgical history with the patient at today's visit.  MEDICATIONS:                                                    I personally reviewed patient's medications and allergies with the patient at today's visit.  ALLERGIES:                                                      Allergies   Allergen Reactions    Lisinopril      Cough       EXAM:                                                    VITAL SIGNS:   BP (!) 142/86 (BP Location: Left arm, Patient Position: Sitting, Cuff Size: Adult Regular)   Pulse 75   Ht 1.613 m (5' 3.5\")   LMP  (LMP Unknown)   SpO2 100%   BMI 25.34 kg/m      Gervais Cognitive Assessment: Performed during occupational therapy evaluation on 11/6/2023, MoCA 8.1 was 20/30 (2/5 visuospatial/executive, 3/3 on naming, 2/6 on attention, 2/3 on language, 2/2 on obstruction, 0/5 on memory recall, and 5/6 on orientation).    General: pt is in NAD, cooperative.  Skin: normal turgor, moist mucous membranes, no lesions/rashes noticed.  HEENT: ATNC, EOMI, PERRL, white sclera, normal conjunctiva, no nystagmus or ptosis. No carotid bruits bilaterally.  Respiratory: lung sounds clear to auscultation bilaterally, no crackles, wheezes, rhonchi. Symmetric lung excursion, no accessory respiratory muscle use.  Cardiovascular: normal S1/S2, no murmurs/rubs/gallops.   Abdomen: Not distended.  : deferred.    Neurological:  Mental: alert, follows commands, MoCA as per above, no aphasia or dysarthria. Fund of knowledge is diminished for age.  Cranial Nerves:  CN II: visual " acuity - able to accurately count fingers with each eye. Visual fields intact, fundi: discs sharp, no papilledema and normal vessels bilaterally.  CN III, IV, VI: EOM intact, pupils equal and reactive  CN V: facial sensation nl  CN VII: face symmetric, no facial droop  CN VIII: hearing normal  CN IX: palate elevation symmetric, uvula at midline  CN XI SCM normal, shoulder shrug nl  CN XII: tongue midline  Motor: Strength: 5/5 in all major groups of all extremities. Normal tone. No abnormal movements. No pronator drift b/l.  Reflexes: Triceps, biceps, brachioradialis, patellar, and achilles reflexes normal and symmetric. No clonus noted. Toes are down-going b/l.   Sensory: light touch, pinprick, and vibration intact. Romberg: negative.  Coordination: FNF and heel-shin tests intact b/l.   Gait:  Normal, able to tandem walk with minimal difficulty.  DATA:   LABS/EEG/IMAGING/OTHER STUDIES: I reviewed pertinent medical records, as detailed in the history of present illness.  ASSESSMENT AND PLAN:      ASSESSMENT: Terri Ratliff is a 73 year old female patient with listed above past medical history, who presents with progressive cognitive decline over the 4 years.    We had a detailed discussion with the patient and her son regarding her presenting complaints.  She had MoCA approximately 2 months ago with the score of 20/30 during occupational therapy evaluation.  This would be consistent with mild cognitive impairment.  Her CPT was 4.2/5.6, consistent with moderate cognitive functional disability that at the face value would require 24-hour supervision.  I discussed these results and advised her not to drive until she passes behind the wheel driving evaluation.  The neurological exam today is non-focal.  We discussed various etiologies of mild cognitive impairment, including vitamin deficiencies, structural brain lesions, and neurodegenerative conditions.  For further diagnostic clarification, I ordered additional  labs, neuropsychologic testing, and brain MRI without contrast.    DIAGNOSES:    ICD-10-CM    1. Mild cognitive impairment  G31.84 Adult Neurology  Referral        PLAN: At today's visit we thoroughly discussed various diagnostic possibilities for patient's symptoms, necessary evaluation, and the plan, which includes:  Orders Placed This Encounter   Procedures    MR Brain w/o Contrast    Vitamin B6    Vitamin B12    Vitamin B1 whole blood    Methylmalonic Acid    Adult Neuropsychology  Referral     No new medications.    I advised the patient to stay physically and mentally active with particular emphasis on daily mentally stimulating activities of her choice (such as crosswords, puzzles, sudoku, etc.), stretching exercises, walking, and healthy eating.     Additional recommendations after the work-up.    Next follow-up appointment is in the next 6 months or earlier if needed.    Total Time: 61 minutes spent on the date of the encounter doing chart review, history and exam, documentation and further activities per the note.    Nick Lauren MD  [facility]  (Chart documentation was completed in part with Dragon voice-recognition software. Even though reviewed, some grammatical, spelling, and word errors may remain.)

## 2024-01-08 ENCOUNTER — TELEPHONE (OUTPATIENT)
Dept: NEUROPSYCHOLOGY | Facility: CLINIC | Age: 74
End: 2024-01-08
Payer: MEDICARE

## 2024-01-08 NOTE — TELEPHONE ENCOUNTER
[facility] Call Center    Phone Message    May a detailed message be left on voicemail: yes     Reason for Call: Appointment Intake    Referring Provider Name: Nick Lauren MD   Diagnosis and/or Symptoms: Mild cognitive impairment    Action Taken: Other: Routed to [facility] Adult [facility]    Travel Screening: Not Applicable      Please review and advise on scheduling, per scheduling guidelines cognitive impairment requires review

## 2024-01-08 NOTE — TELEPHONE ENCOUNTER
Hello,    Please schedule patient per the following protocol:    Timeline: First Available  Location: ANY LOCATION  Preferred Provider: ANY PROVIDER  If Preferred Provider is not available:   Visit Type: NEUROPSYCH EVAL or Neuropsych Interview Only (testing scheduled shortly after interview)  Special Instructions:    Ann Marie Salas  Psychometrist

## 2024-01-11 DIAGNOSIS — E78.5 HYPERLIPIDEMIA LDL GOAL <130: ICD-10-CM

## 2024-01-11 DIAGNOSIS — Z00.00 ROUTINE GENERAL MEDICAL EXAMINATION AT A HEALTH CARE FACILITY: ICD-10-CM

## 2024-01-11 RX ORDER — SIMVASTATIN 40 MG
40 TABLET ORAL AT BEDTIME
Qty: 90 TABLET | Refills: 0 | Status: SHIPPED | OUTPATIENT
Start: 2024-01-11 | End: 2024-04-10

## 2024-01-13 ENCOUNTER — HOSPITAL ENCOUNTER (OUTPATIENT)
Dept: MRI IMAGING | Facility: CLINIC | Age: 74
Discharge: HOME | End: 2024-01-13
Attending: PSYCHIATRY & NEUROLOGY | Admitting: PSYCHIATRY & NEUROLOGY
Payer: MEDICARE

## 2024-01-13 DIAGNOSIS — G31.84 MILD COGNITIVE IMPAIRMENT: ICD-10-CM

## 2024-01-13 PROCEDURE — 70551 MRI BRAIN STEM W/O DYE: CPT | Mod: MG

## 2024-01-15 ENCOUNTER — TELEPHONE (OUTPATIENT)
Dept: NEUROLOGY | Facility: CLINIC | Age: 74
End: 2024-01-15
Payer: MEDICARE

## 2024-01-15 NOTE — TELEPHONE ENCOUNTER
[facility]    Phone Message    May a detailed message be left on voicemail: yes     Reason for Call: Requesting Results     Name/type of test: MRI  Date of test: 1/13/24  Was test done at a location other than [facility] (Please fill in the location if not [facility])?: No    Patient's son Kashif is requesting a call back to review the MRI results. Please call back to advise at # [phone].    Action Taken: Message routed to:  Other: [facility]     Travel Screening: Not Applicable

## 2024-01-16 NOTE — TELEPHONE ENCOUNTER
Per Dr. Lauren, MRI demonstrated mild age-related changes.  No concerning findings for an earlier visit.     Called son back, CTC on file.  Relayed provider message, he verbalized understanding.  He said pt is still having memory concerns, and they have been in discussion with pt to stop driving, but it has been a difficult conversation.      Writer reviewed providers recommendations from visit, and advised that if symptoms worsen we could see pt sooner, otherwise recommended keeping neuropsych appt to evaluate cognitive status in more detail.  He requested appt details be mailed him.      Writer mailed upcoming appt details to Kashif at [address] [place] [address], [phone].     Kierra CASH RN, BSN  [facility]     [FreeTextEntry1] : Menactra booster given

## 2024-02-21 ENCOUNTER — PATIENT OUTREACH (OUTPATIENT)
Dept: CARE COORDINATION | Facility: CLINIC | Age: 74
End: 2024-02-21
Payer: MEDICARE

## 2024-03-06 ENCOUNTER — PATIENT OUTREACH (OUTPATIENT)
Dept: CARE COORDINATION | Facility: CLINIC | Age: 74
End: 2024-03-06
Payer: MEDICARE

## 2024-03-19 ENCOUNTER — TELEPHONE (OUTPATIENT)
Dept: INTERNAL MEDICINE | Facility: CLINIC | Age: 74
End: 2024-03-19
Payer: MEDICARE

## 2024-03-19 NOTE — TELEPHONE ENCOUNTER
Patient Quality Outreach    Patient is due for the following:   Physical Annual Wellness Visit    Next Steps:   Schedule a Annual Wellness Visit    Type of outreach:    Phone, left message for patient/parent to call back.    Next Steps:  Reach out within 90 days via Phone.    Max number of attempts reached: No. Will try again in 90 days if patient still on fail list.    Questions for provider review:    None           Shireen Matt

## 2024-03-25 NOTE — PROGRESS NOTES
OCCUPATIONAL THERAPY DISCHARGE NOTE    Appointment Info   Treating Provider Sarah Laura OTR/RAJ   Total/Authorized Visits Medicare   Visits Used 0/10   Medical Diagnosis R41.3 (ICD-10-CM) - Memory changes   OT Tx Diagnosis decreased IND with IADLs/ ADLs   Quick Add  Certification   Progress Note/Certification   Start Of Care Date 11/06/23   Onset of Illness/Injury or Date of Surgery 10/26/23   Therapy Frequency 1x / week  (start with 4 scheduled following CPT)   Predicted Duration 8 weeks   Certification date from 11/06/23   Certification date to 02/04/24   Progress Note Due Date 02/04/24   Goals   OT Goals 1;2;3;4   OT Goal 1   Goal Identifier Cognition/ Safety   Goal Description Patient and caregiver/staff to verbalize understanding of cognitive screen and Cognitive Performance Test results and identify 3 strategies to increase patient's safety and independence in the home and community setting.   Target Date 02/04/24   OT Goal 2   Goal Identifier Adaptive Strategies   Goal Description Terri will verbalize 3 adaptive strategies (i.e using calendar, association, chunking, etc) to increase independence with IADLS/ ADLs (meal preparation, finances, medication management etc.)   Target Date 02/04/24   OT Goal 3   Goal Identifier Problem Solving   Goal Description Terri will demonstrate the ability to complete moderate to complex problem-solving/planning tasks (logic puzzles, Errands, [employer], etc.) with 80% accuracy to complete home and work tasks safely and accurately such as meal prepation, medication management, etc   Target Date 02/04/24   OT Goal 4   Goal Identifier Community Mobility   Goal Description Terri will demonstrate modified I with visual scanning, reaction time, divided attention, and problem solving to ensure safety for community mobility (i.e. crossing the street, pathfinding, etc.)   Target Date 02/04/24       DISCHARGE  Reason for Discharge: Patient has failed to schedule further  appointments.    Discharge Plan: Patient to continue home program. Recommend patient return to skilled outpatient occupational therapy services in the future as needed with a new doctor's order to work on above goal areas, if patient able to continue with services at a later date.    Referring Provider:  Cherry Wood    Thank you for referring Terri to outpatient occupational therapy at [facility]. Please contact me with any questions or concerns at my email or phone number listed below.    -----------------------------------  Sarah Laura OTR/L  Occupational Therapist     [facility] [address] [address]   [email or web address]   Phone: [phone]  Fax: [phone]  Employed by [employer]

## 2024-04-10 ENCOUNTER — TELEPHONE (OUTPATIENT)
Dept: INTERNAL MEDICINE | Facility: CLINIC | Age: 74
End: 2024-04-10
Payer: MEDICARE

## 2024-04-10 NOTE — TELEPHONE ENCOUNTER
Patient Quality Outreach    Patient is due for the following:   Physical Annual Wellness Visit    Next Steps:   Schedule a Annual Wellness Visit    Type of outreach:    Sent [employer] message.    Next Steps:  Reach out within 90 days via Phone.    Max number of attempts reached: No. Will try again in 90 days if patient still on fail list.    Questions for provider review:               Shireen Matt

## 2024-04-23 DIAGNOSIS — I10 HTN, GOAL BELOW 140/90: ICD-10-CM

## 2024-04-23 RX ORDER — LOSARTAN POTASSIUM 25 MG/1
25 TABLET ORAL DAILY
Qty: 90 TABLET | Refills: 1 | Status: SHIPPED | OUTPATIENT
Start: 2024-04-23

## 2024-05-21 DIAGNOSIS — Z00.00 ROUTINE GENERAL MEDICAL EXAMINATION AT A HEALTH CARE FACILITY: ICD-10-CM

## 2024-05-21 DIAGNOSIS — E78.5 HYPERLIPIDEMIA LDL GOAL <130: ICD-10-CM

## 2024-05-21 DIAGNOSIS — I10 HTN, GOAL BELOW 140/90: ICD-10-CM

## 2024-05-21 RX ORDER — LOSARTAN POTASSIUM 25 MG/1
25 TABLET ORAL DAILY
Qty: 90 TABLET | Refills: 1 | OUTPATIENT
Start: 2024-05-21

## 2024-05-21 NOTE — TELEPHONE ENCOUNTER
Patient got refill for 90 days on 04/10/2024 to [facility] and medication likely already sent to patient. Patient requesting different pharmacy.     Due to no refills sent on 04/10/2024 and failed refill protocol (no LDL on file within 12 months), will route refill request to primary care provider.    Thank you,  Josué Song, Triage RN [facility]  2:56 PM 5/21/2024

## 2024-05-22 RX ORDER — SIMVASTATIN 40 MG
40 TABLET ORAL AT BEDTIME
Qty: 90 TABLET | Refills: 0 | Status: SHIPPED | OUTPATIENT
Start: 2024-05-22 | End: 2024-09-18

## 2024-06-02 ENCOUNTER — HEALTH MAINTENANCE LETTER (OUTPATIENT)
Age: 74
End: 2024-06-02

## 2024-06-04 NOTE — PROGRESS NOTES
"    DISCHARGE  Reason for Discharge: Patient has failed to schedule further appointments.    Equipment Issued: n/a    Discharge Plan: Patient to continue home program. She is welcome to return to OP OT in the future, as needed, with new referral from her provider.    Referring Provider:  Gabriela Theodore-*        11/29/23 0500   Appointment Info   Treating Provider Regina Ibarra, OTR/L   Total/Authorized Visits Medicare   Visits Used 1/10   Medical Diagnosis R41.3 (ICD-10-CM) - Memory changes   OT Tx Diagnosis decreased IND with IADLs/ ADLs   Quick Add  Certification   Progress Note/Certification   Start Of Care Date 11/06/23   Onset of Illness/Injury or Date of Surgery 10/26/23   Therapy Frequency 1x / week  (start with 4 scheduled following CPT)   Predicted Duration 8 weeks   Certification date from 11/06/23   Certification date to 02/04/24   Progress Note Due Date 02/04/24   Goals   OT Goals 1;2;3;4   OT Goal 1   Goal Identifier Cognition/ Safety   Goal Description Patient and caregiver/staff to verbalize understanding of cognitive screen and Cognitive Performance Test results and identify 3 strategies to increase patient's safety and independence in the home and community setting.   Goal Progress Goal met. Administered CPT with pt scoring 4.2 / 5.6 - see separate CPT note for details. Educated in results with application to safety and IND with ADLs/IADLs. Additionally, initiated education in memory compensatory strategies with \"Improve Your Memory\" handout, including: lifestyle factors (physical and cognitive exercise, diet, vision/hearing, sleep, socialization, mental health) as well as specific strategies (organization and external aids).   Target Date 02/04/24   Date Met 11/29/23   OT Goal 2   Goal Identifier Adaptive Strategies   Goal Description Terri will verbalize 3 adaptive strategies (i.e using calendar, association, chunking, etc) to increase independence with IADLS/ ADLs (meal preparation, " "finances, medication management etc.)   Goal Progress Goal progressing but not fully met. Educated in use of memory external aids (calendars, checklists, log/journal, Post-It notes, alarms/timers, etc.) to promote IND with ADLs/IADLs, as well as allowing herself extra time, simplifying tasks, and completing tasks one at a time versus multi-tasking. Had planned to further address and initiate HEP but pt failed to attend/schedule further appointments.   Target Date 02/04/24   OT Goal 3   Goal Identifier Problem Solving   Goal Description Terri will demonstrate the ability to complete moderate to complex problem-solving/planning tasks (logic puzzles, Errands, [employer], etc.) with 80% accuracy to complete home and work tasks safely and accurately such as meal prepation, medication management, etc   Goal Progress Goal not met. Had yet to be addressed at time of discharge.   Target Date 02/04/24   OT Goal 4   Goal Identifier Community Mobility   Goal Description Terri will demonstrate modified I with visual scanning, reaction time, divided attention, and problem solving to ensure safety for community mobility (i.e. crossing the street, pathfinding, etc.)   Goal Progress Goal progressing but not fully met. Administered Dynavision Mode A, B, and B divided attention with pt scoring WNLs for Mode A but BNLs for Mode B and B divided attention - see results below. Recommended pt refrain from driving at this time and complete a formal behind the wheel assessment prior to continuation of driving. Issued alternative transportation resources to be used as needed.  Pt reports understanding but also states \"I'm a safe ; I really don't think I have any trouble.\" Had planned to further assess but pt failed to attend/schedule further appointments.   Target Date 02/04/24   Subjective Report   Subjective Report Pt returns to OP OT today - she is unsure of why she is here and cannot recall being to this clinic previously for " "her initial evaluation.   Objective Measures   Objective Measures Objective Measure 1;Objective Measure 2   Objective Measure 1   Objective Measure CPT   Details As of 11/29/23 - Scored 4.2 / 5.6 - see separate CPT note for details.   Objective Measure 2   Objective Measure Dynavision   Details As of 11/29/23 - Mode A, trial 1: 48 hits (average reaction times 1.1 sec top L, 1.3 sec top R, 1.3 sec bottom R, 1.3 sec bottom L). trial 2: 54 hits (average reaction time 1.0 sec top L, 1.2 sec top R, 1.1 sec bottom R, 1.2 sec bottom L). WNLs is 52+ for Mode A. With Mode B (1.0 sec intervals), trial 1: 32/63 hits (accuracy 50% top L, 29% top R, 55% bottom R, 67% bottom L). trial 2: 35/65 hits (accuracy 59% top L, 56% top R, 45% bottom R, 58% bottom L). WNLs is 42+ for Mode B. With Mode B divided attention: 20/61 hits and 10/10 distractions (accuracy 10% top L, 39% top R, 33% bottom R, 41% bottom L). WNLs is 35+ hits and 9-10  distractions for Mode B divided attention.   Treatment Interventions (OT)   Interventions Self Care/Home Management;Community/Work Reintegration   Self Care/Home Management   Self Care 1 CPT results, memory compensatory strategies   Self Care 1 - Details Administered CPT  - see results in separate note, then educated on results with application to safety and IND with ADLs/IADLs. Educated in CPT results using [facility] approved caregiver handout for CPT levels 4.0 and 4.5 (pt scored in between at 4.2) with emphasis on safety/daily check-ins, creating supportive habits/routines, using external memory aids, allowing self extra time, breaking down tasks and keeping tasks simple, and having family/social supports for higher level ADLs/IADLs (ex. having assistance with new medications, finances, driving, etc). Initiated education in memory compensatory strategies using \"Improve Your Memory\"  handout, including:  increasing aerobic exercise to improve oxygenation/brain metabolism (with encouragement " "to increase activity with use of pacing strategies), doing crossword/sudoku puzzles and games/creative activities to increase processing speed and cognitive flexibility, staying hydrated and eating high nutrient density food (brain healthy fats, antioxidants), staying in check with one's senses (vision/hearing), increasing brain rest (sleep hygiene principles), maintaining social relationships (family, friends, social groups), and maintaining positive attitude (managing stress/worry, can improve cognition with age). Specific strategies for increasing memory also covered including establish fixed locations for important objects and using external aids like calendars, Post-it notes, setting alarms/reminders on phone and timer use. Issued handouts with custom edits for home. Educated in follow up process and provided contact information as needed for any questions following the results of today's testing or education provided.   Self-Care/Home Mgmt/ADL, Compensatory, Meal Prep Minutes (77178) 30 Minutes   Skilled Intervention Skilled education in CPT results with application to ADLs/IADLs and memory compensatory strategies to promote safety and IND   Patient Response/Progress Pt is adament that she is very independent and doesn't think she needs help with anything at home. She does express appreciation for memory strategies/recommendations. Goals 1-2 progressing   Community/Work Reintegration   Community/Work Reintegration Minutes (57520) 25   Skilled Intervention Skilled administration of Dynavision and education in community mobility safety and resources to promote IND with community mobility tasks   Patient Response/Progress \"I'm a safe ; I really don't think I have any trouble.\" - but reports understanding of recommendations. Goal 4 progressing   Community/Work Reintegration 1 Dynavision, community mobility education   Community/Work Reintegration 1 - Details To assess patient visual scanning/awareness and " reaction time, administered Dynavision Mode A, B, and B divided attention - see results above. Educated pt on application of assessment results to  safety and IND with community mobility tasks, particularly driving. Educated in recommendation to refrain from driving and to complete a formal behind the wheel assessment prior to continuation. Issued resource for behind the wheel options and provided written documentation of this recommendation. Additionally, educated pt in alternative resource options for community mobility, including [employer], etc. - issued handout for patient reference for home. Educated in plan for OTR to share assessment results with her doctor with ability to follow up with her doctor with any additional questions/concerns related to driving.   Eval/Assessments   Assessments Cognitive Testing   Standardized Cognitive Testing Minutes (11158) 40 Minutes  (25 administration + 15 interpretation)   Education   Learner/Method Patient;Listening   Education Comments Educated on OT role, plan of care, MOCA results, and evaluation interpretation - see eval for more details.   Plan   Home program CPT, CPT education, improve your memory handout   Plan for next session f/u CPT results, behind the wheel recommendation. f/u household routines, add memory log and cognitive HEP.   Comments   Comments in-basket to PCP regarding CPT results and recommendation for formal behind the wheel assessment   Total Session Time   Timed Code Treatment Minutes 95   Total Treatment Time (sum of timed and untimed services) 95     Thank you for the referral of this patient.  If you have any questions regarding the information in this report, please feel free to contact me per the information provided below.      Regina Ibarra MA, OTR/L  Occupational Therapist  [address] [address]  Clinic Fax:  [phone]  Clinic Phone: [phone]

## 2024-08-20 ENCOUNTER — MYC REFILL (OUTPATIENT)
Dept: INTERNAL MEDICINE | Facility: CLINIC | Age: 74
End: 2024-08-20
Payer: MEDICARE

## 2024-08-20 DIAGNOSIS — I10 HTN, GOAL BELOW 140/90: ICD-10-CM

## 2024-08-20 RX ORDER — LOSARTAN POTASSIUM 25 MG/1
25 TABLET ORAL DAILY
Qty: 90 TABLET | Refills: 1 | OUTPATIENT
Start: 2024-08-20

## 2024-09-18 ENCOUNTER — PATIENT OUTREACH (OUTPATIENT)
Dept: CARE COORDINATION | Facility: CLINIC | Age: 74
End: 2024-09-18
Payer: MEDICARE

## 2024-09-18 DIAGNOSIS — Z00.00 ROUTINE GENERAL MEDICAL EXAMINATION AT A HEALTH CARE FACILITY: ICD-10-CM

## 2024-09-18 DIAGNOSIS — E78.5 HYPERLIPIDEMIA LDL GOAL <130: ICD-10-CM

## 2024-09-20 RX ORDER — SIMVASTATIN 40 MG
40 TABLET ORAL AT BEDTIME
Qty: 90 TABLET | Refills: 0 | Status: SHIPPED | OUTPATIENT
Start: 2024-09-20

## 2025-03-10 ENCOUNTER — DOCUMENTATION ONLY (OUTPATIENT)
Dept: GERIATRICS | Facility: CLINIC | Age: 75
End: 2025-03-10
Payer: MEDICARE

## 2025-03-11 ENCOUNTER — ASSISTED LIVING VISIT (OUTPATIENT)
Dept: GERIATRICS | Facility: CLINIC | Age: 75
End: 2025-03-11
Payer: MEDICARE

## 2025-03-11 VITALS
DIASTOLIC BLOOD PRESSURE: 82 MMHG | OXYGEN SATURATION: 96 % | WEIGHT: 145 LBS | RESPIRATION RATE: 16 BRPM | HEART RATE: 81 BPM | HEIGHT: 63 IN | BODY MASS INDEX: 25.69 KG/M2 | SYSTOLIC BLOOD PRESSURE: 128 MMHG

## 2025-03-11 DIAGNOSIS — I10 HYPERTENSION, UNSPECIFIED TYPE: Primary | ICD-10-CM

## 2025-03-11 DIAGNOSIS — M81.0 OSTEOPOROSIS WITHOUT CURRENT PATHOLOGICAL FRACTURE, UNSPECIFIED OSTEOPOROSIS TYPE: ICD-10-CM

## 2025-03-11 DIAGNOSIS — Z71.89 ACP (ADVANCE CARE PLANNING): ICD-10-CM
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
## 2025-03-11 DIAGNOSIS — E78.5 HYPERLIPIDEMIA LDL GOAL <130: ICD-10-CM

## 2025-03-11 DIAGNOSIS — R41.89 COGNITIVE IMPAIRMENT: ICD-10-CM

## 2025-03-11 NOTE — LETTER
3/11/2025      Terri Ratliff  [address]  [place] [address]        [place] [facility]  PRIMARY CARE PROVIDER AND CLINIC:  BART Feng CNP, [address] [facility] / [place] [address]  Chief Complaint   Patient presents with     [facility]     [place] Medical Record Number:  [id number]  Place of Service where encounter took place:  [facility] (S) [[id number]]    Terri Ratliff  is a 74 year old  (1950), admitted to the above facility from AL apt with spouse..  Admitted to this facility for  medical management and nursing care.    HPI:    HPI information obtained from: facility chart records, facility staff, patient report, [facility] chart review, and family/first contact son report.   Brief Summary of Hospital Course:       Fall: 3/10/25 fell during activity to knees. Small abrasion to knee. No other apparent inj. No apparent head strike. Per staff, gait typically stable. Does not use assistive device.     HTN: BP stable. Today. Has losartan on med list. Some unsure if resident currently taking.     Cog. Impairment.: memory loss. Has had recent sadness due to recent death of spouse. Transferred to memory care. Had Neuro appt. 1/24. Mild cog. Impairment-difficulty with recall. Brain MRI done showed chronic ischemic changes. Mood gen. Stable. Behaviors stable.  Has had good po intake. No reports of insomnia.     Hyperlipidemia.  Has zocor on med. List, son unsure if she was recently taking.  No recent FLP.     OP: DEXA shows op. 12/2020.  Took fosamax [id number].  Boniva [id number]. Last ViT D level wnl. Not currently taking Ca+        Updates on Status Since Skilled nursing Admission: gait steady. No reports of agitation since transfer to secured memory care unit. Family visits often.     CODE STATUS/ADVANCE DIRECTIVES DISCUSSION:   CPR/Full code   Patient's living condition: lives in an assisted living facility  ALLERGIES:  "Lisinopril  PAST MEDICAL HISTORY:  has a past medical history of Osteoporosis.  PAST SURGICAL HISTORY:   has a past surgical history that includes APPENDECTOMY; Bladder surgery (2009); knee surgery; colonoscopy (2005); colonoscopy (9/17/2015); orthopedic surgery; GYN surgery; Colonoscopy (N/A, 9/17/2015); and hysterectomy, pap no longer indicated.  FAMILY HISTORY: family history includes Alcohol/Drug in her father; Cancer in her brother; Diabetes in her mother; Family History Negative in her sister; Hypertension in her sister; No Known Problems in her maternal grandfather, maternal grandmother, paternal grandfather, and paternal grandmother.  SOCIAL HISTORY:   reports that she has never smoked. She has never been exposed to tobacco smoke. She has never used smokeless tobacco. She reports current alcohol use. She reports that she does not use drugs.    Post Discharge Medication Reconciliation Status: discharge medications reconciled and changed, per note/orders    Current Outpatient Medications   Medication Sig Dispense Refill     losartan (COZAAR) 25 MG tablet TAKE 1 TABLET(25 MG) BY MOUTH DAILY 90 tablet 1     MULTIVITAMINS OR TABS 1 tablet daily       simvastatin (ZOCOR) 40 MG tablet Take 1 tablet (40 mg) by mouth at bedtime. 90 tablet 0         ROS:  Limited secondary to cognitive impairment but today pt reports no current pain. No dizziness. No GI distress. No resp. Distress. Sleeping, eating ok    Vitals:  /82   Pulse 81   Resp 16   Ht 1.6 m (5' 3\")   Wt 65.8 kg (145 lb)   LMP  (LMP Unknown)   SpO2 96%   BMI 25.69 kg/m    Exam:  GENERAL APPEARANCE:  Alert, in no distress, appears healthy, cooperative  ENT:  Mouth and posterior oropharynx normal, moist mucous membranes, normal hearing acuity, adequate dentition  EYES:  EOM, conjunctivae, lids, pupils and irises normal, PERRL, no drainage  NECK:  No adenopathy,masses or thyromegaly, FROM, no carotid bruit  RESP:  respiratory effort and palpation of " chest normal, lungs clear to auscultation , no respiratory distress  CV:  Palpation and auscultation of heart done , regular rate and rhythm, no murmur, rub, or gallop, no edema  ABDOMEN:  normal bowel sounds, soft, nontender, no hepatosplenomegaly or other masses, no guarding or rebound, no bruits  M/S:   Gait and station normal  Muscle strength 5/5 all 4 ext. Normal tone  NEURO:   Cranial nerves 2-12 are normal tested and grossly at patient's baseline, speech fluid, no tremor  PSYCH:  memory impaired , affect abnormal -sl flat. Repetitive statements. Some sadness regarding loss of spouse    Lab/Diagnostic data:  No recent    ASSESSMENT/PLAN:  (I10) Hypertension, unspecified type  (primary encounter diagnosis)  Comment: BP stable today. Unclear if currently taking losartan-son does not believe so.   Plan: 1. BP, HR every day x 5 days  2. Reassess at this time. For SBPs > 140, plan to restart losartan  3. Cbc, bmp next week    (W19.XXXA) Fall, initial encounter  Comment: mech. In nature. Minor knee abrasion  Plan: 1. Monitor for unsteady gait  2. Monitor for LE wekaness    (R41.89) Cognitive impairment  Comment: memory loss. Current sadness over loss of spouse. Appears to be adjusting well to new AL unit  Plan: 1. Cont. Secured memory care unit  2. Monitor for depression, insomnia, changes in mood  3. Follow po intake, wt.  4. Cont. To invite to activities during the day    (E78.5) Hyperlipidemia LDL goal <130  Comment: no recent FLP. Son unsure if recently taking zocor  Plan: 1. FLP next week  2. Consider zocor start based on FLP    (M81.0) Osteoporosis without current pathological fracture, unspecified osteoporosis type  Comment: no recent reports of fx. Last DEXA 2020.   Plan: 1. Consider starting Ca+, D-will again speak with son  2. Discuss repeat DEXA    (Z71.89) ACP (advance care planning)  Comment: spoke with son. Will discuss with brother, code status  Plan: 1. Cont. Full code      Electronically signed  by:  BART Feng CNP                        Sincerely,        BART Feng CNP    Electronically signed

## 2025-03-11 NOTE — PROGRESS NOTES
[facility]  PRIMARY CARE PROVIDER AND CLINIC:  Jono Crane, APRN CNP, [address] [facility] [address]  Chief Complaint   Patient presents with    [facility]     [place] Medical Record Number:  [id number]  Place of Service where encounter took place:  [facility] (S) [[id number]]    Terri Ratliff  is a 74 year old  (1950), admitted to the above facility from AL apt with spouse..  Admitted to this facility for  medical management and nursing care.    HPI:    HPI information obtained from: facility chart records, facility staff, patient report, [facility] chart review, and family/first contact son report.   Brief Summary of Hospital Course:       Fall: 3/10/25 fell during activity to knees. Small abrasion to knee. No other apparent inj. No apparent head strike. Per staff, gait typically stable. Does not use assistive device.     HTN: BP stable. Today. Has losartan on med list. Some unsure if resident currently taking.     Cog. Impairment.: memory loss. Has had recent sadness due to recent death of spouse. Transferred to memory care. Had Neuro appt. 1/24. Mild cog. Impairment-difficulty with recall. Brain MRI done showed chronic ischemic changes. Mood gen. Stable. Behaviors stable.  Has had good po intake. No reports of insomnia.     Hyperlipidemia.  Has zocor on med. List, son unsure if she was recently taking.  No recent FLP.     OP: DEXA shows op. 12/2020.  Took fosamax [id number].  Boniva [phone]. Last ViT D level wnl. Not currently taking Ca+        Updates on Status Since Skilled nursing Admission: gait steady. No reports of agitation since transfer to secured memory care unit. Family visits often.     CODE STATUS/ADVANCE DIRECTIVES DISCUSSION:   CPR/Full code   Patient's living condition: lives in an assisted living facility  ALLERGIES: Lisinopril  PAST MEDICAL HISTORY:  has a past medical history of Osteoporosis.  PAST  "SURGICAL HISTORY:   has a past surgical history that includes APPENDECTOMY; Bladder surgery (2009); knee surgery; colonoscopy (2005); colonoscopy (9/17/2015); orthopedic surgery; GYN surgery; Colonoscopy (N/A, 9/17/2015); and hysterectomy, pap no longer indicated.  FAMILY HISTORY: family history includes Alcohol/Drug in her father; Cancer in her brother; Diabetes in her mother; Family History Negative in her sister; Hypertension in her sister; No Known Problems in her maternal grandfather, maternal grandmother, paternal grandfather, and paternal grandmother.  SOCIAL HISTORY:   reports that she has never smoked. She has never been exposed to tobacco smoke. She has never used smokeless tobacco. She reports current alcohol use. She reports that she does not use drugs.    Post Discharge Medication Reconciliation Status: discharge medications reconciled and changed, per note/orders    Current Outpatient Medications   Medication Sig Dispense Refill    losartan (COZAAR) 25 MG tablet TAKE 1 TABLET(25 MG) BY MOUTH DAILY 90 tablet 1    MULTIVITAMINS OR TABS 1 tablet daily      simvastatin (ZOCOR) 40 MG tablet Take 1 tablet (40 mg) by mouth at bedtime. 90 tablet 0         ROS:  Limited secondary to cognitive impairment but today pt reports no current pain. No dizziness. No GI distress. No resp. Distress. Sleeping, eating ok    Vitals:  /82   Pulse 81   Resp 16   Ht 1.6 m (5' 3\")   Wt 65.8 kg (145 lb)   LMP  (LMP Unknown)   SpO2 96%   BMI 25.69 kg/m    Exam:  GENERAL APPEARANCE:  Alert, in no distress, appears healthy, cooperative  ENT:  Mouth and posterior oropharynx normal, moist mucous membranes, normal hearing acuity, adequate dentition  EYES:  EOM, conjunctivae, lids, pupils and irises normal, PERRL, no drainage  NECK:  No adenopathy,masses or thyromegaly, FROM, no carotid bruit  RESP:  respiratory effort and palpation of chest normal, lungs clear to auscultation , no respiratory distress  CV:  Palpation and " auscultation of heart done , regular rate and rhythm, no murmur, rub, or gallop, no edema  ABDOMEN:  normal bowel sounds, soft, nontender, no hepatosplenomegaly or other masses, no guarding or rebound, no bruits  M/S:   Gait and station normal  Muscle strength 5/5 all 4 ext. Normal tone  NEURO:   Cranial nerves 2-12 are normal tested and grossly at patient's baseline, speech fluid, no tremor  PSYCH:  memory impaired , affect abnormal -sl flat. Repetitive statements. Some sadness regarding loss of spouse    Lab/Diagnostic data:  No recent    ASSESSMENT/PLAN:  (I10) Hypertension, unspecified type  (primary encounter diagnosis)  Comment: BP stable today. Unclear if currently taking losartan-son does not believe so.   Plan: 1. BP, HR every day x 5 days  2. Reassess at this time. For SBPs > 140, plan to restart losartan  3. Cbc, bmp next week    (W19.XXXA) Fall, initial encounter  Comment: mech. In nature. Minor knee abrasion  Plan: 1. Monitor for unsteady gait  2. Monitor for LE wekaness    (R41.89) Cognitive impairment  Comment: memory loss. Current sadness over loss of spouse. Appears to be adjusting well to new AL unit  Plan: 1. Cont. Secured memory care unit  2. Monitor for depression, insomnia, changes in mood  3. Follow po intake, wt.  4. Cont. To invite to activities during the day    (E78.5) Hyperlipidemia LDL goal <130  Comment: no recent FLP. Son unsure if recently taking zocor  Plan: 1. FLP next week  2. Consider zocor start based on FLP    (M81.0) Osteoporosis without current pathological fracture, unspecified osteoporosis type  Comment: no recent reports of fx. Last DEXA 2020.   Plan: 1. Consider starting Ca+, D-will again speak with son  2. Discuss repeat DEXA    (Z71.89) ACP (advance care planning)  Comment: spoke with son. Will discuss with brother, code status  Plan: 1. Cont. Full code      Electronically signed by:  BART Feng CNP

## 2025-03-14 ENCOUNTER — LAB REQUISITION (OUTPATIENT)
Dept: LAB | Facility: CLINIC | Age: 75
End: 2025-03-14
Payer: MEDICARE

## 2025-03-14 DIAGNOSIS — I10 ESSENTIAL (PRIMARY) HYPERTENSION: ICD-10-CM

## 2025-03-17 LAB
ALBUMIN SERPL BCG-MCNC: 4.1 G/DL (ref 3.5–5.2)
ALP SERPL-CCNC: 82 U/L (ref 40–150)
ALT SERPL W P-5'-P-CCNC: 18 U/L (ref 0–50)
ANION GAP SERPL CALCULATED.3IONS-SCNC: 19 MMOL/L (ref 7–15)
AST SERPL W P-5'-P-CCNC: 24 U/L (ref 0–45)
BASOPHILS # BLD AUTO: 0.1 10E3/UL (ref 0–0.2)
BASOPHILS NFR BLD AUTO: 1 %
BILIRUB SERPL-MCNC: 0.4 MG/DL
BUN SERPL-MCNC: 15 MG/DL (ref 8–23)
CALCIUM SERPL-MCNC: 9.5 MG/DL (ref 8.8–10.4)
CHLORIDE SERPL-SCNC: 105 MMOL/L (ref 98–107)
CHOLEST SERPL-MCNC: 302 MG/DL
CREAT SERPL-MCNC: 0.8 MG/DL (ref 0.51–0.95)
EGFRCR SERPLBLD CKD-EPI 2021: 77 ML/MIN/1.73M2
EOSINOPHIL # BLD AUTO: 0.1 10E3/UL (ref 0–0.7)
EOSINOPHIL NFR BLD AUTO: 3 %
ERYTHROCYTE [DISTWIDTH] IN BLOOD BY AUTOMATED COUNT: 11.9 % (ref 10–15)
FASTING STATUS PATIENT QL REPORTED: ABNORMAL
GLUCOSE SERPL-MCNC: 88 MG/DL (ref 70–99)
HCO3 SERPL-SCNC: 17 MMOL/L (ref 22–29)
HCT VFR BLD AUTO: 42.4 % (ref 35–47)
HDLC SERPL-MCNC: 40 MG/DL
HGB BLD-MCNC: 13.9 G/DL (ref 11.7–15.7)
IMM GRANULOCYTES # BLD: 0 10E3/UL
IMM GRANULOCYTES NFR BLD: 0 %
LDLC SERPL CALC-MCNC: 235 MG/DL
LYMPHOCYTES # BLD AUTO: 1.7 10E3/UL (ref 0.8–5.3)
LYMPHOCYTES NFR BLD AUTO: 35 %
MCH RBC QN AUTO: 29.6 PG (ref 26.5–33)
MCHC RBC AUTO-ENTMCNC: 32.8 G/DL (ref 31.5–36.5)
MCV RBC AUTO: 90 FL (ref 78–100)
MONOCYTES # BLD AUTO: 0.4 10E3/UL (ref 0–1.3)
MONOCYTES NFR BLD AUTO: 9 %
NEUTROPHILS # BLD AUTO: 2.5 10E3/UL (ref 1.6–8.3)
NEUTROPHILS NFR BLD AUTO: 52 %
NONHDLC SERPL-MCNC: 262 MG/DL
NRBC # BLD AUTO: 0 10E3/UL
NRBC BLD AUTO-RTO: 0 /100
PLATELET # BLD AUTO: 238 10E3/UL (ref 150–450)
POTASSIUM SERPL-SCNC: 4.2 MMOL/L (ref 3.4–5.3)
PROT SERPL-MCNC: 6.9 G/DL (ref 6.4–8.3)
RBC # BLD AUTO: 4.7 10E6/UL (ref 3.8–5.2)
RETICS # AUTO: 0.07 10E6/UL (ref 0.03–0.1)
RETICS/RBC NFR AUTO: 1.5 % (ref 0.5–2)
SODIUM SERPL-SCNC: 141 MMOL/L (ref 135–145)
TRIGL SERPL-MCNC: 135 MG/DL
WBC # BLD AUTO: 4.8 10E3/UL (ref 4–11)

## 2025-03-17 PROCEDURE — P9604 ONE-WAY ALLOW PRORATED TRIP: HCPCS | Mod: ORL | Performed by: NURSE PRACTITIONER

## 2025-03-17 PROCEDURE — 85025 COMPLETE CBC W/AUTO DIFF WBC: CPT | Mod: ORL | Performed by: NURSE PRACTITIONER

## 2025-03-17 PROCEDURE — 85045 AUTOMATED RETICULOCYTE COUNT: CPT | Mod: ORL | Performed by: NURSE PRACTITIONER

## 2025-03-17 PROCEDURE — 80053 COMPREHEN METABOLIC PANEL: CPT | Mod: ORL | Performed by: NURSE PRACTITIONER

## 2025-03-17 PROCEDURE — 36415 COLL VENOUS BLD VENIPUNCTURE: CPT | Mod: ORL | Performed by: NURSE PRACTITIONER

## 2025-03-17 PROCEDURE — 80061 LIPID PANEL: CPT | Mod: ORL | Performed by: NURSE PRACTITIONER

## 2025-03-18 LAB
PATH REPORT.COMMENTS IMP SPEC: NORMAL
PATH REPORT.COMMENTS IMP SPEC: NORMAL
PATH REPORT.FINAL DX SPEC: NORMAL
PATH REPORT.MICROSCOPIC SPEC OTHER STN: NORMAL
PATH REPORT.MICROSCOPIC SPEC OTHER STN: NORMAL

## 2025-03-18 PROCEDURE — 99207 BLOOD MORPHOLOGY PATHOLOGIST REVIEW: CPT | Performed by: PATHOLOGY

## 2025-03-20 ENCOUNTER — ASSISTED LIVING VISIT (OUTPATIENT)
Dept: GERIATRICS | Facility: CLINIC | Age: 75
End: 2025-03-20
Payer: MEDICARE

## 2025-03-20 VITALS
DIASTOLIC BLOOD PRESSURE: 85 MMHG | OXYGEN SATURATION: 95 % | HEART RATE: 70 BPM | RESPIRATION RATE: 16 BRPM | SYSTOLIC BLOOD PRESSURE: 130 MMHG

## 2025-03-20 DIAGNOSIS — E78.5 HYPERLIPIDEMIA LDL GOAL <130: ICD-10-CM

## 2025-03-20 DIAGNOSIS — R41.89 COGNITIVE IMPAIRMENT: ICD-10-CM

## 2025-03-20 DIAGNOSIS — I10 HYPERTENSION, UNSPECIFIED TYPE: Primary | ICD-10-CM

## 2025-03-20 NOTE — PROGRESS NOTES
[facility]  [place] Medical Record Number:  [id number]  Place of Service where encounter took place:  [facility] ([facility]) [[id number]]  Chief Complaint   Patient presents with    Hypertension       HPI:    Terri Ratliff  is a 74 year old (1950), who is being seen today for an episodic care visit.  HPI information obtained from: facility chart records, facility staff, patient report, [facility] chart review, and family/first contact son report. Today's concern is: HTN, hyperlipidemia, cog. Impairment. Has HTN, had not been taking BP meds-had lived in apt with spouse. Spouse recently . Resident transferred to memory car unit. Has cognitive impairment, memory loss. Requires reminders, cues from staff. SBA with showers, cues for dressing. Some sadness over louse of spouse, behaviors stable. Has hyperlipidemia. Cholesterol levels elevated with last lab draw, unclear if labs were fasting.        Past Medical and Surgical History reviewed in Epic today.    MEDICATIONS:    Current Outpatient Medications   Medication Sig Dispense Refill    losartan (COZAAR) 25 MG tablet TAKE 1 TABLET(25 MG) BY MOUTH DAILY 90 tablet 1    simvastatin (ZOCOR) 40 MG tablet Take 1 tablet (40 mg) by mouth at bedtime. 90 tablet 0         REVIEW OF SYSTEMS:  Limited secondary to cognitive impairment but today pt reports no current pain, resp or GI distress. Reports sleeping ok    Objective:  /85   Pulse 70   Resp 16   LMP  (LMP Unknown)   SpO2 95%   Exam:  GENERAL APPEARANCE:  Alert, in no distress, cooperative  ENT:  Mouth and posterior oropharynx normal, moist mucous membranes, [place]  EYES:  EOM, conjunctivae, lids, pupils and irises normal, PERRL  RESP:  respiratory effort and palpation of chest normal, lungs clear to auscultation , no respiratory distress  CV:  Palpation and auscultation of heart done , regular rate and rhythm, no murmur, rub, or gallop, no edema  ABDOMEN:   normal bowel sounds, soft, nontender, no hepatosplenomegaly or other masses, no guarding or rebound  M/S:   Gait and station normal  Muscle strength 5/5 all 4 ext.   NEURO:   Cranial nerves 2-12 are normal tested and grossly at patient's baseline, speech fluid, no tremor  PSYCH:  insight and judgement impaired, memory impaired , affect abnormal -flat    Labs:   Most Recent 3 CBC's:  Recent Labs   Lab Test 03/17/25  0805 10/26/23  1126 03/22/23  0832 02/04/23  1301   WBC 4.8  --  4.7 10.8   HGB 13.9 13.9 14.0 13.8   MCV 90  --  91 94     --  240 213     Most Recent 3 BMP's:  Recent Labs   Lab Test 03/17/25  0805 10/26/23  1126 03/22/23  0832    145 145   POTASSIUM 4.2 4.1 4.6   CHLORIDE 105 105 109*   CO2 17* 24 24   BUN 15.0 14.6 15.0   CR 0.80 0.91 0.83   ANIONGAP 19* 16* 12   LISA 9.5 9.9 9.5   GLC 88 128* 97     Most Recent Cholesterol Panel:  Recent Labs   Lab Test 03/17/25  0805   CHOL 302*   *   HDL 40*   TRIG 135       ASSESSMENT/PLAN:  (I10) Hypertension, unspecified type  (primary encounter diagnosis)  Comment: Bps variable, elevated at times-160s SBP. HR stable  Plan: 1. Restart losartan 25 mg every day-has taken this previously   2. Follow Bps, Hrs  3. Monitor for dizziness  4. For ongoing elevated BP, consider increased losartan dose  5. Bmp in next 2-3 mos    (E78.5) Hyperlipidemia LDL goal <130  Comment: elevated cholesterol. Unclear if last lab was fasting  Plan: 1. Restart zocor  2. Repeat FLP next week  3. Encourage activity, follow wt.s    (R41.89) Cognitive impairment  Comment: memory loss. Requires reminders from staff. Behaviors stable. Appears to be adjusting well to memory care  Plan: 1. Monitor for anxiety, insomnia, depression  2. Cont. To engage in activity during the day              Electronically signed by:  BART Feng CNP

## 2025-03-20 NOTE — LETTER
3/20/2025      Terri Ratliff  [address]  [place] [address]        [facility]  [place] Medical Record Number:  [id number]  Place of Service where encounter took place:  [facility] (S) [[id number]]  Chief Complaint   Patient presents with     Hypertension       HPI:    Terri Ratliff  is a 74 year old (1950), who is being seen today for an episodic care visit.  HPI information obtained from: facility chart records, facility staff, patient report, [facility] chart review, and family/first contact son report. Today's concern is: HTN, hyperlipidemia, cog. Impairment. Has HTN, had not been taking BP meds-had lived in apt with spouse. Spouse recently . Resident transferred to memory car unit. Has cognitive impairment, memory loss. Requires reminders, cues from staff. SBA with showers, cues for dressing. Some sadness over louse of spouse, behaviors stable. Has hyperlipidemia. Cholesterol levels elevated with last lab draw, unclear if labs were fasting.        Past Medical and Surgical History reviewed in Epic today.    MEDICATIONS:    Current Outpatient Medications   Medication Sig Dispense Refill     losartan (COZAAR) 25 MG tablet TAKE 1 TABLET(25 MG) BY MOUTH DAILY 90 tablet 1     simvastatin (ZOCOR) 40 MG tablet Take 1 tablet (40 mg) by mouth at bedtime. 90 tablet 0         REVIEW OF SYSTEMS:  Limited secondary to cognitive impairment but today pt reports no current pain, resp or GI distress. Reports sleeping ok    Objective:  /85   Pulse 70   Resp 16   LMP  (LMP Unknown)   SpO2 95%   Exam:  GENERAL APPEARANCE:  Alert, in no distress, cooperative  ENT:  Mouth and posterior oropharynx normal, moist mucous membranes, [place]  EYES:  EOM, conjunctivae, lids, pupils and irises normal, PERRL  RESP:  respiratory effort and palpation of chest normal, lungs clear to auscultation , no respiratory distress  CV:  Palpation and auscultation of  heart done , regular rate and rhythm, no murmur, rub, or gallop, no edema  ABDOMEN:  normal bowel sounds, soft, nontender, no hepatosplenomegaly or other masses, no guarding or rebound  M/S:   Gait and station normal  Muscle strength 5/5 all 4 ext.   NEURO:   Cranial nerves 2-12 are normal tested and grossly at patient's baseline, speech fluid, no tremor  PSYCH:  insight and judgement impaired, memory impaired , affect abnormal -flat    Labs:   Most Recent 3 CBC's:  Recent Labs   Lab Test 03/17/25  0805 10/26/23  1126 03/22/23  0832 02/04/23  1301   WBC 4.8  --  4.7 10.8   HGB 13.9 13.9 14.0 13.8   MCV 90  --  91 94     --  240 213     Most Recent 3 BMP's:  Recent Labs   Lab Test 03/17/25  0805 10/26/23  1126 03/22/23  0832    145 145   POTASSIUM 4.2 4.1 4.6   CHLORIDE 105 105 109*   CO2 17* 24 24   BUN 15.0 14.6 15.0   CR 0.80 0.91 0.83   ANIONGAP 19* 16* 12   LISA 9.5 9.9 9.5   GLC 88 128* 97     Most Recent Cholesterol Panel:  Recent Labs   Lab Test 03/17/25  0805   CHOL 302*   *   HDL 40*   TRIG 135       ASSESSMENT/PLAN:  (I10) Hypertension, unspecified type  (primary encounter diagnosis)  Comment: Bps variable, elevated at times-160s SBP. HR stable  Plan: 1. Restart losartan 25 mg every day-has taken this previously   2. Follow Bps, Hrs  3. Monitor for dizziness  4. For ongoing elevated BP, consider increased losartan dose  5. Bmp in next 2-3 mos    (E78.5) Hyperlipidemia LDL goal <130  Comment: elevated cholesterol. Unclear if last lab was fasting  Plan: 1. Restart zocor  2. Repeat FLP next week  3. Encourage activity, follow wt.s    (R41.89) Cognitive impairment  Comment: memory loss. Requires reminders from staff. Behaviors stable. Appears to be adjusting well to memory care  Plan: 1. Monitor for anxiety, insomnia, depression  2. Cont. To engage in activity during the day              Electronically signed by:  BART Feng CNP             Sincerely,        Jono Weston  BART Crane CNP    Electronically signed

## 2025-04-02 ENCOUNTER — LAB REQUISITION (OUTPATIENT)
Dept: LAB | Facility: CLINIC | Age: 75
End: 2025-04-02
Payer: MEDICARE

## 2025-04-02 DIAGNOSIS — E78.5 HYPERLIPIDEMIA, UNSPECIFIED: ICD-10-CM

## 2025-04-07 LAB
CHOLEST SERPL-MCNC: 195 MG/DL
FASTING STATUS PATIENT QL REPORTED: YES
HDLC SERPL-MCNC: 44 MG/DL
LDLC SERPL CALC-MCNC: 119 MG/DL
NONHDLC SERPL-MCNC: 151 MG/DL
TRIGL SERPL-MCNC: 159 MG/DL

## 2025-04-07 PROCEDURE — 80061 LIPID PANEL: CPT | Mod: ORL | Performed by: NURSE PRACTITIONER

## 2025-04-07 PROCEDURE — 36415 COLL VENOUS BLD VENIPUNCTURE: CPT | Mod: ORL | Performed by: NURSE PRACTITIONER

## 2025-04-07 PROCEDURE — P9604 ONE-WAY ALLOW PRORATED TRIP: HCPCS | Mod: ORL | Performed by: NURSE PRACTITIONER

## 2025-04-24 ENCOUNTER — ASSISTED LIVING VISIT (OUTPATIENT)
Dept: GERIATRICS | Facility: CLINIC | Age: 75
End: 2025-04-24
Payer: MEDICARE

## 2025-04-24 VITALS — HEIGHT: 63 IN | BODY MASS INDEX: 25.69 KG/M2 | WEIGHT: 145 LBS

## 2025-04-24 DIAGNOSIS — F02.B0 MODERATE EARLY ONSET ALZHEIMER'S DEMENTIA WITHOUT BEHAVIORAL DISTURBANCE, PSYCHOTIC DISTURBANCE, MOOD DISTURBANCE, OR ANXIETY (H): Primary | ICD-10-CM

## 2025-04-24 DIAGNOSIS — E78.5 HYPERLIPIDEMIA LDL GOAL <130: ICD-10-CM

## 2025-04-24 DIAGNOSIS — M81.0 OSTEOPOROSIS WITHOUT CURRENT PATHOLOGICAL FRACTURE, UNSPECIFIED OSTEOPOROSIS TYPE: ICD-10-CM

## 2025-04-24 DIAGNOSIS — I10 BENIGN ESSENTIAL HYPERTENSION: ICD-10-CM

## 2025-04-24 DIAGNOSIS — G30.0 MODERATE EARLY ONSET ALZHEIMER'S DEMENTIA WITHOUT BEHAVIORAL DISTURBANCE, PSYCHOTIC DISTURBANCE, MOOD DISTURBANCE, OR ANXIETY (H): Primary | ICD-10-CM

## 2025-04-24 PROCEDURE — 99349 HOME/RES VST EST MOD MDM 40: CPT | Performed by: INTERNAL MEDICINE

## 2025-04-24 NOTE — LETTER
4/24/2025      Terri Ratliff  [address]  [facility] [address]        No notes on file      Sincerely,        Betsey Lerma MD    Electronically signed

## 2025-05-10 NOTE — PROGRESS NOTES
"Terri Ratliff is a 74 year old female seen 2025 at [facility] unit where she has resided for one month (admit 3/2025) seen for initial visit.   Pt is seen on the unit up to chair, ambulates there without device.   She has finished her lunch early so she can watch the [employer] game and is a bit distracted by that.    States she is feeling well, \"just fine.\"   Denies pain or other symptoms.      [facility] staff reports pt enjoys group activities, and no new concerns     By chart review, pt was seen by Neurology Dr Lauren in 2024 for memory loss reported over previous 4 years, with functional decline    Pt and her  lived in [place] until he  in 2025       Past Medical History:   Diagnosis Date    HTN (hypertension)     Hyperlipidemia LDL goal <130     Osteoporosis   DXA , on bisphosphonates 8088-5867 and [id number]    Early onset Alzheimer's dementia      Past Surgical History:   Procedure Laterality Date    BLADDER SURGERY      sling     COLONOSCOPY      COLONOSCOPY  2015    Dr. Mccarthy [facility]    COLONOSCOPY N/A 2015    Procedure: COLONOSCOPY;  Surgeon: Ted Mccarthy MD;  Location:  GI    GYN SURGERY      HYSTERECTOMY, PAP NO LONGER INDICATED      ovaries are present    KNEE SURGERY      ORTHOPEDIC SURGERY      ZZC APPENDECTOMY       SH:  , lived in [place] with her  until his death in 2025   [facility]    Non smoker     ROS:  [facility] 20/ and CPT 4.2 in      Wt Readings from Last 5 Encounters:   25 65.8 kg (145 lb)   25 65.8 kg (145 lb)   10/26/23 65.9 kg (145 lb 4.8 oz)   23 64.9 kg (143 lb 1.6 oz)   23 64.4 kg (142 lb)      GENERAL APPEARANCE: alert and no distress  Ht 1.6 m (5' 3\")   Wt 65.8 kg (145 lb)   LMP  (LMP Unknown)   BMI 25.69 kg/m     HEENT: normocephalic, no lesion or abnormalities  NECK: no adenopathy, no asymmetry, masses, or scars and thyroid normal to palpation  RESP: lungs " clear to auscultation - no rales, rhonchi or wheezes  CV: regular rate and rhythm, normal S1 S2  ABDOMEN:  soft, nontender, no HSM or masses and bowel sounds normal  MS: extremities normal- no gross deformities noted, no evidence of inflammation in joints, FROM in all extremities.  SKIN: no suspicious lesions or rashes  NEURO: Normal strength and tone, sensory exam grossly normal, and speech normal, limited hx  PSYCH: affect okay, distractible   LYMPHATICS: No cervical,  or supraclavicular nodes     Lab Results   Component Value Date     03/17/2025    POTASSIUM 4.2 03/17/2025    CHLORIDE 105 03/17/2025    CO2 17 (L) 03/17/2025    ANIONGAP 19 (H) 03/17/2025    GLC 88 03/17/2025    BUN 15.0 03/17/2025    CR 0.80 03/17/2025    GFRESTIMATED 77 03/17/2025    LISA 9.5 03/17/2025     Lab Results   Component Value Date    AST 24 03/17/2025      ALBUMIN 4.1 03/17/2025      ALKPHOS 82 03/17/2025     Lab Results   Component Value Date    WBC 4.8 03/17/2025      HGB 13.9 03/17/2025      MCV 90 03/17/2025       03/17/2025     TSH   Date Value Ref Range Status   10/26/2023 2.60 0.30 - 4.20 uIU/mL Final   03/08/2022 2.71 0.40 - 4.00 mU/L Final   11/13/2020 2.38 0.40 - 4.00 mU/L Final      Lab Results   Component Value Date    A1C 5.6 03/22/2023     MR BRAIN W/O CONTRAST  1/13/2024  INTRACRANIAL CONTENTS: No acute or subacute infarct. No mass, acute hemorrhage, or extra-axial fluid collections. Scattered nonspecific T2/FLAIR hyperintensities within the cerebral white matter and radha most consistent with mild chronic microvascular ischemic change. Focus of susceptibility staining in the left anterior paramedian frontal lobe corpus callosum may represent a chronic microhemorrhage or small cavernoma. Mild generalized cerebral volume loss. No hydrocephalus. Normal position of the cerebellar tonsils.                                                             IMPRESSION:  1.  Senescent intracranial changes and sequelae of  mild chronic microangiopathy without acute intracranial abnormality.      IMP/PLAN:   (G30.0,  F02.B0) Moderate early onset Alzheimer's dementia without behavioral disturbance, psychotic disturbance, mood disturbance, or anxiety (H)   Comment: progressive cognitive decline over past 5 years   Preserved language and mobility     Plan: [facility] unit for assist with med admin, meals, activity and secure unit     (I10) Benign essential hypertension  Comment:   BP Readings from Last 3 Encounters:   03/20/25 130/85   03/11/25 128/82   01/05/24 133/84      Plan: losartan 25 mg/day     (E78.5) Hyperlipidemia LDL goal <130  Comment: no CV event  Lab Results   Component Value Date    CHOL 195 04/07/2025      HDL 44 04/07/2025       04/07/2025      TRIG 159 04/07/2025     Plan: simvastatin 40 mg /day     (M81.0) Osteoporosis without current pathological fracture, unspecified osteoporosis type  Comment: was on a bisphosphonate for two 5 year stents, since 2007    Last dosing in 2022    Plan: would recheck DXA and vit D level before starting treatment again, if pt /family interested       Advance directive:  full code per signed IRMA Lerma MD

## 2025-05-20 ENCOUNTER — ASSISTED LIVING VISIT (OUTPATIENT)
Dept: GERIATRICS | Facility: CLINIC | Age: 75
End: 2025-05-20
Payer: MEDICARE

## 2025-05-20 VITALS
DIASTOLIC BLOOD PRESSURE: 76 MMHG | SYSTOLIC BLOOD PRESSURE: 103 MMHG | RESPIRATION RATE: 16 BRPM | OXYGEN SATURATION: 96 % | HEART RATE: 69 BPM | HEIGHT: 63 IN | BODY MASS INDEX: 25.69 KG/M2

## 2025-05-20 DIAGNOSIS — F02.B0 MODERATE EARLY ONSET ALZHEIMER'S DEMENTIA WITHOUT BEHAVIORAL DISTURBANCE, PSYCHOTIC DISTURBANCE, MOOD DISTURBANCE, OR ANXIETY (H): ICD-10-CM

## 2025-05-20 DIAGNOSIS — I10 BENIGN ESSENTIAL HYPERTENSION: ICD-10-CM

## 2025-05-20 DIAGNOSIS — G30.0 MODERATE EARLY ONSET ALZHEIMER'S DEMENTIA WITHOUT BEHAVIORAL DISTURBANCE, PSYCHOTIC DISTURBANCE, MOOD DISTURBANCE, OR ANXIETY (H): ICD-10-CM

## 2025-05-20 DIAGNOSIS — R19.7 DIARRHEA, UNSPECIFIED TYPE: Primary | ICD-10-CM

## 2025-05-20 PROCEDURE — 99349 HOME/RES VST EST MOD MDM 40: CPT | Performed by: NURSE PRACTITIONER

## 2025-05-20 RX ORDER — LOPERAMIDE HYDROCHLORIDE 2 MG/1
2 TABLET ORAL 3 TIMES DAILY PRN
COMMUNITY

## 2025-05-20 NOTE — PROGRESS NOTES
"[facility]  [place] Medical Record Number:  [id number]  Place of Service where encounter took place:  [facility] (S) [[id number]]  Chief Complaint   Patient presents with    Diarrhea       HPI:    Terri Ratliff  is a 74 year old (1950), who is being seen today for an episodic care visit.  HPI information obtained from: facility chart records, facility staff, patient report, and [facility] chart review. Today's concern is: diarrhea, HTN, alzheimer's. Per staff, has had episodes of diarrhea, occ nausea. Po intake gen. Stable. Has adequate fluid intake. For HTN taking losartan. Bps, Hrs stable. No reports of dizziness. Memory loss due to alzheimer's. Occ increase in anxiety. Increased freq. Of urinary incont.        Past Medical and Surgical History reviewed in Epic today.    MEDICATIONS:    Current Outpatient Medications   Medication Sig Dispense Refill    loperamide (IMODIUM A-D) 2 MG tablet Take 2 mg by mouth 3 times daily as needed for diarrhea.      losartan (COZAAR) 25 MG tablet TAKE 1 TABLET(25 MG) BY MOUTH DAILY 90 tablet 1    simvastatin (ZOCOR) 40 MG tablet Take 1 tablet (40 mg) by mouth at bedtime. 90 tablet 0         REVIEW OF SYSTEMS:  Limited secondary to cognitive impairment but today pt reports feeling ok, no current diarrhea or Gi distress    Objective:  /76   Pulse 69   Resp 16   Ht 1.6 m (5' 3\")   LMP  (LMP Unknown)   SpO2 96%   BMI 25.69 kg/m    Exam:  GENERAL APPEARANCE:  Alert, in no distress, cooperative  ENT:  Mouth and posterior oropharynx normal, moist mucous membranes, [place]  EYES:  EOM, conjunctivae, lids, pupils and irises normal, PERRL  RESP:  respiratory effort and palpation of chest normal, lungs clear to auscultation , no respiratory distress  CV:  Palpation and auscultation of heart done , regular rate and rhythm, no murmur, rub, or gallop, no edema  ABDOMEN:  normal bowel sounds, soft, nontender, no " hepatosplenomegaly or other masses, no guarding or rebound  M/S:   Gait and station normal  Muscle strength 5/5 all 4 ext. Normal tone  NEURO:   Cranial nerves 2-12 are normal tested and grossly at patient's baseline, speech fluid, no tremor  PSYCH:  insight and judgement impaired, memory impaired , affect and mood normal, no apparent anxiety    Labs:   Most Recent 3 CBC's:  Recent Labs   Lab Test 03/17/25  0805 10/26/23  1126 03/22/23  0832 02/04/23  1301   WBC 4.8  --  4.7 10.8   HGB 13.9 13.9 14.0 13.8   MCV 90  --  91 94     --  240 213     Most Recent 3 BMP's:  Recent Labs   Lab Test 03/17/25  0805 10/26/23  1126 03/22/23  0832    145 145   POTASSIUM 4.2 4.1 4.6   CHLORIDE 105 105 109*   CO2 17* 24 24   BUN 15.0 14.6 15.0   CR 0.80 0.91 0.83   ANIONGAP 19* 16* 12   LISA 9.5 9.9 9.5   GLC 88 128* 97       ASSESSMENT/PLAN:  (R19.7) Diarrhea, unspecified type  (primary encounter diagnosis)  Comment: newer onset. No episodes today. Last episode when out with family  Plan: 1. Start prn imodium  2. Encourage fluids  3. Monitor for nausea, further diarrhea, abd pain    (I10) Benign essential hypertension  Comment: BP stable  Plan: 1. Cont losartan  2. Follow Bps, Hrs  3. Monitor for dizziness, low Bps  4. Bmp in next 2-3 mos    (G30.0,  F02.B0) Moderate early onset Alzheimer's dementia without behavioral disturbance, psychotic disturbance, mood disturbance, or anxiety (H)  Comment: increased anxiety at times. Increased freq. Of urinary incont.  Plan: 1. Cont. Secured memory care unit  2. Monitor for further increase in anxiety, may consider celexa  3. Monitor for increased need of staff assist, incont. Brief changes              Electronically signed by:  BART Feng CNP

## 2025-05-20 NOTE — LETTER
" 5/20/2025      Terri Ratliff  [address]  [place] [address]        [facility]  [place] Medical Record Number:  [id number]  Place of Service where encounter took place:  [facility] (S) [[id number]]  Chief Complaint   Patient presents with     Diarrhea       HPI:    Terri Ratliff  is a 74 year old (1950), who is being seen today for an episodic care visit.  HPI information obtained from: facility chart records, facility staff, patient report, and [facility] chart review. Today's concern is: diarrhea, HTN, alzheimer's. Per staff, has had episodes of diarrhea, occ nausea. Po intake gen. Stable. Has adequate fluid intake. For HTN taking losartan. Bps, Hrs stable. No reports of dizziness. Memory loss due to alzheimer's. Occ increase in anxiety. Increased freq. Of urinary incont.        Past Medical and Surgical History reviewed in Epic today.    MEDICATIONS:    Current Outpatient Medications   Medication Sig Dispense Refill     loperamide (IMODIUM A-D) 2 MG tablet Take 2 mg by mouth 3 times daily as needed for diarrhea.       losartan (COZAAR) 25 MG tablet TAKE 1 TABLET(25 MG) BY MOUTH DAILY 90 tablet 1     simvastatin (ZOCOR) 40 MG tablet Take 1 tablet (40 mg) by mouth at bedtime. 90 tablet 0         REVIEW OF SYSTEMS:  Limited secondary to cognitive impairment but today pt reports feeling ok, no current diarrhea or Gi distress    Objective:  /76   Pulse 69   Resp 16   Ht 1.6 m (5' 3\")   LMP  (LMP Unknown)   SpO2 96%   BMI 25.69 kg/m    Exam:  GENERAL APPEARANCE:  Alert, in no distress, cooperative  ENT:  Mouth and posterior oropharynx normal, moist mucous membranes, [place]  EYES:  EOM, conjunctivae, lids, pupils and irises normal, PERRL  RESP:  respiratory effort and palpation of chest normal, lungs clear to auscultation , no respiratory distress  CV:  Palpation and auscultation of heart done , regular rate and rhythm, no murmur, rub, " or gallop, no edema  ABDOMEN:  normal bowel sounds, soft, nontender, no hepatosplenomegaly or other masses, no guarding or rebound  M/S:   Gait and station normal  Muscle strength 5/5 all 4 ext. Normal tone  NEURO:   Cranial nerves 2-12 are normal tested and grossly at patient's baseline, speech fluid, no tremor  PSYCH:  insight and judgement impaired, memory impaired , affect and mood normal, no apparent anxiety    Labs:   Most Recent 3 CBC's:  Recent Labs   Lab Test 03/17/25  0805 10/26/23  1126 03/22/23  0832 02/04/23  1301   WBC 4.8  --  4.7 10.8   HGB 13.9 13.9 14.0 13.8   MCV 90  --  91 94     --  240 213     Most Recent 3 BMP's:  Recent Labs   Lab Test 03/17/25  0805 10/26/23  1126 03/22/23  0832    145 145   POTASSIUM 4.2 4.1 4.6   CHLORIDE 105 105 109*   CO2 17* 24 24   BUN 15.0 14.6 15.0   CR 0.80 0.91 0.83   ANIONGAP 19* 16* 12   LISA 9.5 9.9 9.5   GLC 88 128* 97       ASSESSMENT/PLAN:  (R19.7) Diarrhea, unspecified type  (primary encounter diagnosis)  Comment: newer onset. No episodes today. Last episode when out with family  Plan: 1. Start prn imodium  2. Encourage fluids  3. Monitor for nausea, further diarrhea, abd pain    (I10) Benign essential hypertension  Comment: BP stable  Plan: 1. Cont losartan  2. Follow Bps, Hrs  3. Monitor for dizziness, low Bps  4. Bmp in next 2-3 mos    (G30.0,  F02.B0) Moderate early onset Alzheimer's dementia without behavioral disturbance, psychotic disturbance, mood disturbance, or anxiety (H)  Comment: increased anxiety at times. Increased freq. Of urinary incont.  Plan: 1. Cont. Secured memory care unit  2. Monitor for further increase in anxiety, may consider celexa  3. Monitor for increased need of staff assist, incont. Brief changes              Electronically signed by:  BART Feng CNP              Sincerely,        BART Feng CNP    Electronically signed

## 2025-06-14 ENCOUNTER — HEALTH MAINTENANCE LETTER (OUTPATIENT)
Age: 75
End: 2025-06-14

## 2025-07-01 ENCOUNTER — ASSISTED LIVING VISIT (OUTPATIENT)
Dept: GERIATRICS | Facility: CLINIC | Age: 75
End: 2025-07-01
Payer: MEDICARE

## 2025-07-01 VITALS
WEIGHT: 145 LBS | SYSTOLIC BLOOD PRESSURE: 137 MMHG | HEIGHT: 63 IN | OXYGEN SATURATION: 96 % | HEART RATE: 76 BPM | DIASTOLIC BLOOD PRESSURE: 79 MMHG | BODY MASS INDEX: 25.69 KG/M2 | RESPIRATION RATE: 18 BRPM

## 2025-07-01 DIAGNOSIS — R19.7 DIARRHEA, UNSPECIFIED TYPE: ICD-10-CM

## 2025-07-01 DIAGNOSIS — I10 BENIGN ESSENTIAL HYPERTENSION: Primary | ICD-10-CM

## 2025-07-01 DIAGNOSIS — R41.89 COGNITIVE IMPAIRMENT: ICD-10-CM

## 2025-07-01 PROCEDURE — 99349 HOME/RES VST EST MOD MDM 40: CPT | Performed by: NURSE PRACTITIONER

## 2025-07-01 NOTE — PROGRESS NOTES
"[facility]  [place] Medical Record Number:  [id number]  Place of Service where encounter took place:  [facility] (S) [[id number]]  Chief Complaint   Patient presents with    Hypertension       HPI:    Terri Ratliff  is a 75 year old (1950), who is being seen today for an episodic care visit.  HPI information obtained from: facility chart records, facility staff, patient report, and [facility] chart review. Today's concern is: HTN, diarrhea, cognitive impairment. For HTN cont. On  losartan. BP, Hrs stable. No reports of dizziness. Has prn imodium for diarrhea. Per staff, no increased freq. Of diarrhea. No recent abd pain. Po intake stable. Memory loss. Cont. On secured memory care unit. Mood gen. Stable. Participated in activities. No reports of insomnia.        Past Medical and Surgical History reviewed in Epic today.    MEDICATIONS:    Current Outpatient Medications   Medication Sig Dispense Refill    loperamide (IMODIUM A-D) 2 MG tablet Take 2 mg by mouth 3 times daily as needed for diarrhea.      losartan (COZAAR) 25 MG tablet TAKE 1 TABLET(25 MG) BY MOUTH DAILY 90 tablet 1    simvastatin (ZOCOR) 40 MG tablet Take 1 tablet (40 mg) by mouth at bedtime. 90 tablet 0         REVIEW OF SYSTEMS:  Limited secondary to cognitive impairment but today pt reports no pain, no GI distress. No resp. distress    Objective:  /79   Pulse 76   Resp 18   Ht 1.6 m (5' 3\")   Wt 65.8 kg (145 lb)   LMP  (LMP Unknown)   SpO2 96%   BMI 25.69 kg/m    Exam:  GENERAL APPEARANCE:  Alert, in no distress, cooperative  ENT:  Mouth and posterior oropharynx normal, moist mucous membranes, [place]  EYES:  EOM, conjunctivae, lids, pupils and irises normal, PERRL  RESP:  respiratory effort and palpation of chest normal, lungs clear to auscultation , no respiratory distress  CV:  Palpation and auscultation of heart done , regular rate and rhythm, no murmur, rub, or gallop, no " edema  ABDOMEN:  normal bowel sounds, soft, nontender, no hepatosplenomegaly or other masses, no guarding or rebound  M/S:   Gait and station normal  Muscle strength 5/5 all 4 ext. Normal tone  NEURO:   Cranial nerves 2-12 are normal tested and grossly at patient's baseline, no tremor  PSYCH:  memory impaired , affect and mood normal, no apparent anxiety    Labs:   Most Recent 3 BMP's:  Recent Labs   Lab Test 03/17/25  0805 10/26/23  1126 03/22/23  0832    145 145   POTASSIUM 4.2 4.1 4.6   CHLORIDE 105 105 109*   CO2 17* 24 24   BUN 15.0 14.6 15.0   CR 0.80 0.91 0.83   ANIONGAP 19* 16* 12   LISA 9.5 9.9 9.5   GLC 88 128* 97       ASSESSMENT/PLAN:  (I10) Benign essential hypertension  (primary encounter diagnosis)  Comment: BP stable  Plan: 1. Cont losartan  2. Follow Bps, Hrs  3. Monitor for dizziness    (R19.7) Diarrhea, unspecified type  Comment: currently stable  Plan: 1. Cont. Prn imodium  2. For increased in s/s, may sched. Imodium  3. Encourage fluids  4. Bmp in next 1-3 mos    (R41.89) Cognitive impairment  Comment: memory loss. Mood, behaviors gen. Stable. Po intake, wt stable  Plan: 1. Cont. Secured memory care unit  2. Engage in activity during the day  3. Monitor for increased anxiety, changes in behaviors              Electronically signed by:  BART Feng CNP

## 2025-07-01 NOTE — LETTER
" 7/1/2025      Terri Ratliff  [address]  [place] [address]        [facility]  [place] Medical Record Number:  [id number]  Place of Service where encounter took place:  [facility] (S) [[id number]]  Chief Complaint   Patient presents with     Hypertension       HPI:    Terri Ratliff  is a 75 year old (1950), who is being seen today for an episodic care visit.  HPI information obtained from: facility chart records, facility staff, patient report, and [facility] chart review. Today's concern is: HTN, diarrhea, cognitive impairment. For HTN cont. On  losartan. BP, Hrs stable. No reports of dizziness. Has prn imodium for diarrhea. Per staff, no increased freq. Of diarrhea. No recent abd pain. Po intake stable. Memory loss. Cont. On secured memory care unit. Mood gen. Stable. Participated in activities. No reports of insomnia.        Past Medical and Surgical History reviewed in Epic today.    MEDICATIONS:    Current Outpatient Medications   Medication Sig Dispense Refill     loperamide (IMODIUM A-D) 2 MG tablet Take 2 mg by mouth 3 times daily as needed for diarrhea.       losartan (COZAAR) 25 MG tablet TAKE 1 TABLET(25 MG) BY MOUTH DAILY 90 tablet 1     simvastatin (ZOCOR) 40 MG tablet Take 1 tablet (40 mg) by mouth at bedtime. 90 tablet 0         REVIEW OF SYSTEMS:  Limited secondary to cognitive impairment but today pt reports no pain, no GI distress. No resp. distress    Objective:  /79   Pulse 76   Resp 18   Ht 1.6 m (5' 3\")   Wt 65.8 kg (145 lb)   LMP  (LMP Unknown)   SpO2 96%   BMI 25.69 kg/m    Exam:  GENERAL APPEARANCE:  Alert, in no distress, cooperative  ENT:  Mouth and posterior oropharynx normal, moist mucous membranes, [place]  EYES:  EOM, conjunctivae, lids, pupils and irises normal, PERRL  RESP:  respiratory effort and palpation of chest normal, lungs clear to auscultation , no respiratory distress  CV:  Palpation and " auscultation of heart done , regular rate and rhythm, no murmur, rub, or gallop, no edema  ABDOMEN:  normal bowel sounds, soft, nontender, no hepatosplenomegaly or other masses, no guarding or rebound  M/S:   Gait and station normal  Muscle strength 5/5 all 4 ext. Normal tone  NEURO:   Cranial nerves 2-12 are normal tested and grossly at patient's baseline, no tremor  PSYCH:  memory impaired , affect and mood normal, no apparent anxiety    Labs:   Most Recent 3 BMP's:  Recent Labs   Lab Test 03/17/25  0805 10/26/23  1126 03/22/23  0832    145 145   POTASSIUM 4.2 4.1 4.6   CHLORIDE 105 105 109*   CO2 17* 24 24   BUN 15.0 14.6 15.0   CR 0.80 0.91 0.83   ANIONGAP 19* 16* 12   LISA 9.5 9.9 9.5   GLC 88 128* 97       ASSESSMENT/PLAN:  (I10) Benign essential hypertension  (primary encounter diagnosis)  Comment: BP stable  Plan: 1. Cont losartan  2. Follow Bps, Hrs  3. Monitor for dizziness    (R19.7) Diarrhea, unspecified type  Comment: currently stable  Plan: 1. Cont. Prn imodium  2. For increased in s/s, may sched. Imodium  3. Encourage fluids  4. Bmp in next 1-3 mos    (R41.89) Cognitive impairment  Comment: memory loss. Mood, behaviors gen. Stable. Po intake, wt stable  Plan: 1. Cont. Secured memory care unit  2. Engage in activity during the day  3. Monitor for increased anxiety, changes in behaviors              Electronically signed by:  BRAT Feng CNP              Sincerely,        BART Feng CNP    Electronically signed

## 2025-08-18 ENCOUNTER — PATIENT OUTREACH (OUTPATIENT)
Dept: CARE COORDINATION | Facility: CLINIC | Age: 75
End: 2025-08-18
Payer: MEDICARE

## 2025-08-28 ENCOUNTER — ASSISTED LIVING VISIT (OUTPATIENT)
Dept: GERIATRICS | Facility: CLINIC | Age: 75
End: 2025-08-28
Payer: MEDICARE

## 2025-08-28 VITALS
DIASTOLIC BLOOD PRESSURE: 76 MMHG | OXYGEN SATURATION: 94 % | BODY MASS INDEX: 25.69 KG/M2 | RESPIRATION RATE: 18 BRPM | HEIGHT: 63 IN | SYSTOLIC BLOOD PRESSURE: 133 MMHG | HEART RATE: 78 BPM

## 2025-08-28 DIAGNOSIS — I10 BENIGN ESSENTIAL HYPERTENSION: Primary | ICD-10-CM

## 2025-08-28 DIAGNOSIS — R19.7 DIARRHEA, UNSPECIFIED TYPE: ICD-10-CM

## 2025-08-28 DIAGNOSIS — G30.0 MODERATE EARLY ONSET ALZHEIMER'S DEMENTIA WITHOUT BEHAVIORAL DISTURBANCE, PSYCHOTIC DISTURBANCE, MOOD DISTURBANCE, OR ANXIETY (H): ICD-10-CM

## 2025-08-28 DIAGNOSIS — F02.B0 MODERATE EARLY ONSET ALZHEIMER'S DEMENTIA WITHOUT BEHAVIORAL DISTURBANCE, PSYCHOTIC DISTURBANCE, MOOD DISTURBANCE, OR ANXIETY (H): ICD-10-CM
